# Patient Record
Sex: MALE | Race: BLACK OR AFRICAN AMERICAN | NOT HISPANIC OR LATINO | Employment: FULL TIME | URBAN - METROPOLITAN AREA
[De-identification: names, ages, dates, MRNs, and addresses within clinical notes are randomized per-mention and may not be internally consistent; named-entity substitution may affect disease eponyms.]

---

## 2017-01-29 ENCOUNTER — HOSPITAL ENCOUNTER (EMERGENCY)
Facility: HOSPITAL | Age: 47
Discharge: HOME | End: 2017-01-30
Attending: EMERGENCY MEDICINE
Payer: COMMERCIAL

## 2017-01-29 DIAGNOSIS — S01.511A LACERATION OF LIP, INITIAL ENCOUNTER: Primary | ICD-10-CM
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
## 2017-01-29 PROCEDURE — 25000003 PHARM REV CODE 250: Performed by: EMERGENCY MEDICINE

## 2017-01-29 PROCEDURE — 40654 RPR LIP FTH>1HALF VER HT/CPX: CPT

## 2017-01-29 PROCEDURE — 99284 EMERGENCY DEPT VISIT MOD MDM: CPT

## 2017-01-29 PROCEDURE — 99284 EMERGENCY DEPT VISIT MOD MDM: CPT | Mod: ,,, | Performed by: EMERGENCY MEDICINE

## 2017-01-29 RX ORDER — LIDOCAINE HYDROCHLORIDE 20 MG/ML
10 INJECTION, SOLUTION INFILTRATION; PERINEURAL
Status: COMPLETED | OUTPATIENT
Start: 2017-01-29 | End: 2017-01-29

## 2017-01-29 RX ORDER — LIDOCAINE HYDROCHLORIDE AND EPINEPHRINE 20; 10 MG/ML; UG/ML
10 INJECTION, SOLUTION INFILTRATION; PERINEURAL
Status: COMPLETED | OUTPATIENT
Start: 2017-01-29 | End: 2017-01-29

## 2017-01-29 RX ORDER — LIDOCAINE HYDROCHLORIDE 10 MG/ML
10 INJECTION INFILTRATION; PERINEURAL
Status: COMPLETED | OUTPATIENT
Start: 2017-01-29 | End: 2017-01-29

## 2017-01-29 RX ADMIN — LIDOCAINE HYDROCHLORIDE 10 ML: 10 INJECTION, SOLUTION INFILTRATION; PERINEURAL at 10:01

## 2017-01-29 RX ADMIN — LIDOCAINE HYDROCHLORIDE AND EPINEPHRINE 10 ML: 20; 10 INJECTION, SOLUTION INFILTRATION; PERINEURAL at 10:01

## 2017-01-29 RX ADMIN — LIDOCAINE HYDROCHLORIDE 10 ML: 20 INJECTION, SOLUTION INFILTRATION; PERINEURAL at 10:01

## 2017-01-29 NOTE — ED AVS SNAPSHOT
[facility]  [address] [facility] [address]               Chris Mahmood   2017  9:00 PM   ED    Description:  Male : 1970   Department:  [facility]           Your Care was Coordinated By:     Provider Role From To    Shaq Monet MD Attending Provider 17 2100 --      Reason for Visit     Animal Bite           Diagnoses this Visit        Comments    Laceration of lip, initial encounter    -  Primary     Dog bite, initial encounter           ED Disposition     None           To Do List           Follow-up Information     Schedule an appointment as soon as possible for a visit with Lars Crum MD.    Specialty:  Plastic Surgery    Contact information:    [address] [place] [id number]  [phone]          [facility]    Specialty:  Emergency Medicine    Why:  suture removal in 7 day    Contact information:    [address] [place] [id number] [phone]       These Medications        Disp Refills Start End    chlorhexidine (PERIDEX) 0.12 % solution 118 mL 0 2017    Use as directed 15 mLs in the mouth or throat 2 (two) times daily. - Mouth/Throat    Pharmacy: [facility] - [address] Ph #: [phone]       amoxicillin-clavulanate 875-125mg (AUGMENTIN) 875-125 mg per tablet 14 tablet 0 2017    Take 1 tablet by mouth 2 (two) times daily. - Oral    Pharmacy: [facility] - [address] Ph #: [phone]         [facility]     [facility] Care [facility] -  Assistance  Registered nurses in the [facility] provide clinical advisement, health education, appointment booking, and other advisory services.  Call for this free service at [phone].             Medications           Message regarding Medications     Verify the changes and/or  additions to your medication regime listed below are the same as discussed with your clinician today.  If any of these changes or additions are incorrect, please notify your healthcare provider.        START taking these NEW medications        Refills    chlorhexidine (PERIDEX) 0.12 % solution 0    Sig: Use as directed 15 mLs in the mouth or throat 2 (two) times daily.    Class: Print    Route: Mouth/Throat    amoxicillin-clavulanate 875-125mg (AUGMENTIN) 875-125 mg per tablet 0    Sig: Take 1 tablet by mouth 2 (two) times daily.    Class: Print    Route: Oral      These medications were administered today        Dose Freq    lidocaine HCL 10 mg/ml (1%) injection 10 mL 10 mL ED 1 Time    Sig: 10 mLs by Infiltration route ED 1 Time.    Class: Normal    Route: Infiltration    lidocaine-EPINEPHrine 2%-1:100,000 injection 10 mL 10 mL ED 1 Time    Sig: Inject 10 mLs into the skin ED 1 Time.    Class: Normal    Route: Intradermal    lidocaine HCL 20 mg/ml (2%) injection 10 mL 10 mL ED 1 Time    Sig: 10 mLs by Infiltration route ED 1 Time.    Class: Normal    Route: Infiltration      STOP taking these medications     ibuprofen (ADVIL,MOTRIN) 800 MG tablet     tizanidine (ZANAFLEX) 4 MG tablet            Verify that the below list of medications is an accurate representation of the medications you are currently taking.  If none reported, the list may be blank. If incorrect, please contact your healthcare provider. Carry this list with you in case of emergency.           Current Medications     ranitidine (ZANTAC) 150 MG capsule Take 150 mg by mouth 2 (two) times daily.    amoxicillin-clavulanate 875-125mg (AUGMENTIN) 875-125 mg per tablet Take 1 tablet by mouth 2 (two) times daily.    chlorhexidine (PERIDEX) 0.12 % solution Use as directed 15 mLs in the mouth or throat 2 (two) times daily.    lidocaine HCL 10 mg/ml (1%) injection 10 mL 10 mLs by Infiltration route ED 1 Time.    lidocaine HCL 20 mg/ml (2%) injection 10 mL 10  "mLs by Infiltration route ED 1 Time.    lidocaine-EPINEPHrine 2%-1:100,000 injection 10 mL Inject 10 mLs into the skin ED 1 Time.           Clinical Reference Information           Your Vitals Were     BP Pulse Temp Resp Height Weight    182/104 (BP Location: Right arm, Patient Position: Sitting) 89 98.7 °F (37.1 °C) (Oral) 18 5' 10" (1.778 m) 117.9 kg (260 lb)    SpO2 BMI             96% 37.31 kg/m2         Allergies as of 1/30/2017     No Known Allergies      Immunizations Administered on Date of Encounter - 1/30/2017     None      ED Micro, Lab, POCT     None      ED Imaging Orders     None        Discharge Instructions         Animal Bite (General)  An animal bite can cause a wound deep enough to break the skin. In such cases, the wound is cleaned and then closed. Sometimes, the wound is not closed completely. This is so that fluid can drain if the wound becomes infected. In addition to wound care, a tetanus shot may be given, if needed.    Home care  · Care for the wound as directed. If a dressing was applied to the wound, be sure to change it as directed.  · Wash your hands well with soap and warm water before and after caring for the wound. This helps lower the risk of infection.  · If the wound bleeds, place a clean, soft cloth on the wound. Then firmly apply pressure until the bleeding stops. This may take up to 5 minutes. Do not release the pressure and look at the wound during this time.  · Most skin wounds heal within 10 days. But an infection can occur even with proper treatment. So be sure to watch the wound for signs of infection (see below). Check the wound as often as directed by your health care provider.  · Antibiotics may be prescribed. These help prevent or treat infection. If youre given antibiotics, take them as directed. Also be sure to complete the medications.  Rabies Prevention  Rabies is a virus that can be carried in certain animals. These can include domestic animals such as dogs and " cats. Wild animals such as skunks, raccoons, foxes, and bats can also carry rabies. Pets fully vaccinated against rabies (2 shots) are at very low risk of infection. But because human rabies is almost always fatal, any biting pet should be confined for 10 days as an extra precaution. In general, if there is a risk for rabies, the following steps may need to be taken:  · If someones pet dog or cat has bitten you, it should be kept in a secure area for the next 10 days to watch for signs of illness. (If the pet owner wont allow this, contact your local animal control center.) If the dog or cat becomes ill or dies during that time, contact your local animal control center at once so the animal may be tested for rabies. If the pet stays healthy for the next 10 days, there is no danger of rabies in the animal or you.  · If a stray pet bit you, contact your local animal control center. They can give information on capture, quarantine, and animal rabies testing.  · If you cant locate the animal that bit you in the next 2 days, and if rabies exists in your region, you may need to receive the rabies vaccine series. Call your health care provider right away. Or, return to the emergency department promptly.  · All animal bites should be reported to the local animal control center. If you were not given a form to fill out, you can report this yourself.  Follow-up care  Follow up with your health care provider, or as directed.  When to seek medical advice  Call your health care provider right away if any of these occur:  · Signs of infection:  ¨ Spreading redness or warmth from the wound  ¨ Increased pain or swelling  ¨ Fever of 100.4ºF (38ºC) or higher, or as directed by your health care provider  ¨ Colored fluid or pus draining from the wound  · Signs of rabies infection:  ¨ Headache  ¨ Confusion  ¨ Strange behavior  ¨ Seizure  · Decreased ability to move any body part near the bite area  · Bleeding that cannot be stopped  after 5 minutes of firm pressure  © 8919-4253 [employer]. [address] [place] [address], [place] [address]. All rights reserved. This information is not intended as a substitute for professional medical care. Always follow your healthcare professional's instructions.          Lip or Mouth Laceration  A laceration is a cut through the skin. When the cut is on the outside of the lip, it may be closed with stitches, surgical tape, or skin glue. Cuts inside the mouth may be sutured or left open, depending on the size. When stitches are used in the mouth, they are usually the kind that dissolve.  A tetanus shot may be given if you are not current on this vaccination and the object that caused the cut may lead to tetanus.    Home care  · If you were given an antibiotic to prevent infection, do not stop taking this medication until you have finished the prescribed course or the healthcare provider tells you to stop.  · The healthcare provider may prescribe medications for pain. Follow instructions for taking these medications.   · Follow the healthcare providers instructions on how to care for the cut.  · Wash your hands with soap and warm water before and after caring for your cut. This helps prevent infection.  · If the cut is inside your mouth, clean the wound by rinsing your mouth after each meal and at bedtime with a mixture of equal parts water and hydrogen peroxide. (Do not swallow!) Or, you can use a cotton swab to apply hydrogen peroxide directly onto the cut.  · Mouth wounds can cause pain when chewing. Soft foods can help with this. If needed, use a local, over-the-counter numbing solution for pain relief, such as one for teething babies. Apply this directly to the wound with a cotton-tip swab or your finger.  · If the wound is bandaged, leave the original bandage in place for 24 hours. Replace it if it becomes wet or dirty. After 24 hours, change it once a day or as directed.  · Clean the wound daily.  First, remove the bandage. Then wash the area gently with soap and warm water, or as directed by your childs provider. Use a wet cotton swab to loosen and remove any blood or crust that forms. After cleaning, apply a thin layer of antibiotic ointment if advised. Then put on a new bandage.  · If the cut is on the outside of the lip and sutures were used, you may shower as usual after the first 24 hours, but do not put your head under water or swim until the sutures are removed. After removing the bandage, wash the area with soap and water. Use a wet cotton swab to loosen and remove any blood or crust that forms. After cleaning, keep the wound clean and dry. Talk with your doctor before applying any antibiotic ointment to the wound. You may apply an adhesive bandage or leave the wound open. Unless told otherwise, sutures on the inside of the mouth will likely dissolve on their own.  · If skin glue was used, do not scratch, rub, or pick at the adhesive film. Do not place tape directly over the film. Do not apply liquid, ointment, or creams to the wound while the film is in place. Do not clean the wound with peroxide and do not apply ointment. Avoid activities that cause heavy sweating until the film has fallen off. Protect the wound from prolonged exposure to sunlight or tanning lamps. You may shower as usual but do not soak the wound in water (no swimming).  Follow-up care  Follow up with your healthcare provider as directed. If you have sutures that won't dissolve on their own, return as directed to have them removed.  When to seek medical advice  Call your healthcare provider right away if any of these occur:  · Wound bleeding not controlled by direct pressure  · Signs of infection, including increasing pain in the wound, increasing wound redness or swelling, or pus or bad odor coming from the wound  · Fever of 100.4°F (38.ºC) or higher or as directed by your healthcare provider  · Stitches come apart or fall out  or surgical tape falls off before 5 days  · Wound edges re-open  · Wound changes colors  · Numbness around the wound   © 3600-0360 [employer]. [address] [place] [address], [place] [address]. All rights reserved. This information is not intended as a substitute for professional medical care. Always follow your healthcare professional's instructions.          [facility] Sign-Up     Activating your [facility] account is as easy as 1-2-3!     1) Visit [email or web address], select Sign Up Now, enter this activation code and your date of birth, then select Next.  [email or web address]  Expires: 3/16/2017 12:06 AM      2) Create a username and password to use when you visit [facility] in the future and select a security question in case you lose your password and select Next.    3) Enter your e-mail address and click Sign Up!    Additional Information  If you have questions, please e-mail [email or web address] or call [phone] to talk to our [facility] staff. Remember, [facility] is NOT to be used for urgent needs. For medical emergencies, dial 911.         Smoking Cessation     If you would like to quit smoking:   You may be eligible for free services if you are a [place] resident and started smoking cigarettes before September 1, 1988.  Call the Smoking Cessation Trust ([facility]) toll free at (299) 295-5488 or (666) 941-8917.   Call 1-800-QUIT-NOW if you do not meet the above criteria.             [facility] complies with applicable Federal civil rights laws and does not discriminate on the basis of race, color, national origin, age, disability, or sex.        Language Assistance Services     ATTENTION: Language assistance services are available, free of charge. Please call [phone].      ATENCIÓN: Si habla español, tiene a whitaker disposición servicios gratuitos de asistencia lingüística. Llame al [phone].     CHÚ Ý: N?u b?n nói Ti?ng Vi?t, có các d?ch v? h? tr? ngôn ng? mi?n phí dành cho b?n.  G?i s? [phone].

## 2017-01-30 VITALS
DIASTOLIC BLOOD PRESSURE: 78 MMHG | HEIGHT: 70 IN | BODY MASS INDEX: 37.22 KG/M2 | TEMPERATURE: 98 F | WEIGHT: 260 LBS | HEART RATE: 76 BPM | OXYGEN SATURATION: 99 % | RESPIRATION RATE: 18 BRPM | SYSTOLIC BLOOD PRESSURE: 136 MMHG

## 2017-01-30 RX ORDER — CHLORHEXIDINE GLUCONATE ORAL RINSE 1.2 MG/ML
15 SOLUTION DENTAL 2 TIMES DAILY
Qty: 118 ML | Refills: 0 | Status: SHIPPED | OUTPATIENT
Start: 2017-01-30 | End: 2017-02-13

## 2017-01-30 RX ORDER — AMOXICILLIN AND CLAVULANATE POTASSIUM 875; 125 MG/1; MG/1
1 TABLET, FILM COATED ORAL 2 TIMES DAILY
Qty: 14 TABLET | Refills: 0 | Status: SHIPPED | OUTPATIENT
Start: 2017-01-30 | End: 2017-02-09

## 2017-01-30 NOTE — ED PROVIDER NOTES
Encounter Date: 1/29/2017    SCRIBE #1 NOTE: I, Rosemary Ward, am scribing for, and in the presence of, Dr. Monet.       History     Chief Complaint   Patient presents with    Animal Bite     transfer from [facility], dog bite about 1900, missing part of bottom lip.      Review of patient's allergies indicates:  No Known Allergies  HPI Comments: Time patient was seen by the provider: 9:09 PM      The patient is a 46 y.o. male with no significant medical hx that presents to the ED as a transfer from [place] with a complaint of a dog bite at approximately 1900 this evening, 2 hours prior to C arrival. Pt was playing with two dogs and his puppy bit him on the right side of his lip, removing a portion of the lip. Pt states tetanus is UTD. No other trauma. No additional complaints.  The history is provided by the patient.     Past Medical History   Diagnosis Date    GERD (gastroesophageal reflux disease)      No past medical history pertinent negatives.  History reviewed. No pertinent past surgical history.  No family history on file.  Social History   Substance Use Topics    Smoking status: Former Smoker    Smokeless tobacco: None    Alcohol use Yes      Comment: pt drinks moonshine daily     Review of Systems   Constitutional: Negative for fever.   HENT: Negative for nosebleeds.         Dog bite to lip with a large piece missing.   Eyes: Negative for visual disturbance.   Respiratory: Negative for shortness of breath.    Cardiovascular: Negative for chest pain.   Gastrointestinal: Negative for vomiting.   Genitourinary: Negative for hematuria.   Musculoskeletal: Negative for neck stiffness.   Skin: Negative for rash.   Neurological: Negative for syncope.       Physical Exam   Initial Vitals   BP Pulse Resp Temp SpO2   01/29/17 2058 01/29/17 2058 01/29/17 2058 01/29/17 2058 01/29/17 2058   182/104 89 18 98.7 °F (37.1 °C) 96 %     Physical Exam    Nursing note and vitals reviewed.  Constitutional: He  appears well-developed and well-nourished.   HENT:   Large piece of lower lip missing. No other signs of trauma. No active bleeding.     Eyes: EOM are normal. Pupils are equal, round, and reactive to light.   Neck: Normal range of motion. Neck supple.   Cardiovascular: Normal rate, regular rhythm, normal heart sounds and intact distal pulses.   Pulmonary/Chest: Breath sounds normal. No respiratory distress. He has no wheezes. He has no rhonchi. He has no rales.   Abdominal: Soft. There is no tenderness. There is no rebound and no guarding.   Musculoskeletal: Normal range of motion.   Neurological: He is alert and oriented to person, place, and time.   Skin: Skin is warm and dry.         ED Course   Procedures  Labs Reviewed - No data to display          Medical Decision Making:   History:   Old Medical Records: I decided to obtain old medical records.  Initial Assessment:   Pt presents as a transfer from [place] after a dog bite to the lip from a puppy. Tetanus shot UTD. No concern for rabies from this story. Will consult and discuss with plastic surgery.            Scribe Attestation:   Scribe #1: I performed the above scribed service and the documentation accurately describes the services I performed. I attest to the accuracy of the note.    Attending Attestation:           Physician Attestation for Scribe:  Physician Attestation Statement for Scribe #1: I, Dr. Monet, reviewed documentation, as scribed by Rosemary Ward in my presence, and it is both accurate and complete.         Attending ED Notes:   9:13 PM  Plastic surgery paged.    9:40 PM  Discussed with plastic surgery. They are at bedside and will repair the lac.    12:22 AM  Plastics repaired the wound. Will discharge home.          ED Course     Clinical Impression:   The primary encounter diagnosis was Laceration of lip, initial encounter. A diagnosis of Dog bite, initial encounter was also pertinent to this visit.    Disposition:   Disposition:  Discharged  Condition: Stable       Shaq Monet MD  01/30/17 2269

## 2017-01-30 NOTE — CONSULTS
Consult Note  Plastic Surgery    Consult Requested By: ER  Reason for Consult: dog bite    SUBJECTIVE:     History of Present Illness:  The patient is a 46 y.o. male with no significant medical hx that presents to the ED as a transfer from [place] with a complaint of a dog bite at approximately 1900 this evening, 2 hours prior to C arrival. Pt was playing with two dogs and his puppy bit him on the right side of his lip, removing a portion of the lip. Pt states tetanus is UTD. No other trauma. No additional complaints.    Scheduled Meds:   lidocaine HCL 10 mg/ml (1%)  10 mL Infiltration ED 1 Time    lidocaine HCL 20 mg/ml (2%)  10 mL Infiltration ED 1 Time    lidocaine-EPINEPHrine 2%-1:100,000  10 mL Intradermal ED 1 Time     Continuous Infusions:   PRN Meds:    Review of patient's allergies indicates:  No Known Allergies    Past Medical History   Diagnosis Date    GERD (gastroesophageal reflux disease)      History reviewed. No pertinent past surgical history.  No family history on file.  Social History   Substance Use Topics    Smoking status: Former Smoker    Smokeless tobacco: None    Alcohol use Yes      Comment: pt drinks moonshine daily        Review of Systems:      OBJECTIVE:     Vital Signs (Most Recent)  Temp: 98.7 °F (37.1 °C) (01/29/17 2058)  Pulse: 89 (01/29/17 2058)  Resp: 18 (01/29/17 2058)  BP: (!) 182/104 (01/29/17 2058)  SpO2: 96 % (01/29/17 2058)    Vital Signs Range (Last 24H):  Temp:  [98.7 °F (37.1 °C)]   Pulse:  [89]   Resp:  [18]   BP: (182)/(104)   SpO2:  [96 %]     Physical Exam:  Pt had gingival buccal sulcus tear from canine to canine along mandible  Pt has full thickness segment  Of right lower lip missing 5-10% through the vermillion roll    After consent pt was given a mental nerve block with lidocaine 2% and skin was infiltrated with 1% lido with epinephrine  Wound was irrigated copiously with normal saline  Pt was prepped and draped in standard sterile fashion  Vicryl  used to approximate orbicularis oris muscle.  Chromic used to approximate mucosa, wet and dry vermillion.  A back cut was done along the vermillion roll to advance medial and lateral segment of lip to meet.  Dog ear at skin was excised.  Skin was approximated with 5-0 prolene.  Pt tolerated procedure well.    ASSESSMENT/PLAN:     Pt s/p dog bite, irrigation debridement and repair at bedside.    Augmention PO  peridex mouth wash  Sutures (prolene) at skin to be out in 5-7 days (saturday or Sunday)  May f/u with dr marin PRN

## 2017-01-30 NOTE — ED NOTES
Plastic Surgery MD remains at the bedside for suturing. Pt tolerating procedure well. NAD noted. Will continue to monitor.

## 2017-01-30 NOTE — ED NOTES
Patient identifiers verified and correct for Chris Mahmood.    LOC: The patient is awake, alert and aware of environment with an appropriate affect, the patient is oriented x 3 and speaking appropriately.  APPEARANCE: Patient resting comfortably and in no acute distress, patient is clean and well groomed, patient's clothing is properly fastened.  SKIN: The skin is warm and dry, color consistent with ethnicity, patient has normal skin turgor and moist mucus membranes, large portion of right lower lip has been bitten off. No active bleeding noted.  MUSCULOSKELETAL: Patient moving all extremities spontaneously, no obvious swelling or deformities noted.  RESPIRATORY: Airway is open and patent, respirations are spontaneous, patient has a normal effort and rate, no accessory muscle use noted, bilateral breath sounds.  CARDIAC: Patient has a normal rate and regular rhythm, no periphreal edema noted, capillary refill < 3 seconds.  ABDOMEN: Soft and non tender to palpation, no distention noted, normoactive bowel sounds present in all four quadrants.  NEUROLOGIC: Eyes open spontaneously, behavior appropriate to situation, follows commands, facial expression symmetrical, bilateral hand grasp equal and even, purposeful motor response noted, normal sensation in all extremities when touched with a finger.

## 2017-01-30 NOTE — DISCHARGE INSTRUCTIONS
Animal Bite (General)  An animal bite can cause a wound deep enough to break the skin. In such cases, the wound is cleaned and then closed. Sometimes, the wound is not closed completely. This is so that fluid can drain if the wound becomes infected. In addition to wound care, a tetanus shot may be given, if needed.    Home care  · Care for the wound as directed. If a dressing was applied to the wound, be sure to change it as directed.  · Wash your hands well with soap and warm water before and after caring for the wound. This helps lower the risk of infection.  · If the wound bleeds, place a clean, soft cloth on the wound. Then firmly apply pressure until the bleeding stops. This may take up to 5 minutes. Do not release the pressure and look at the wound during this time.  · Most skin wounds heal within 10 days. But an infection can occur even with proper treatment. So be sure to watch the wound for signs of infection (see below). Check the wound as often as directed by your health care provider.  · Antibiotics may be prescribed. These help prevent or treat infection. If youre given antibiotics, take them as directed. Also be sure to complete the medications.  Rabies Prevention  Rabies is a virus that can be carried in certain animals. These can include domestic animals such as dogs and cats. Wild animals such as skunks, raccoons, foxes, and bats can also carry rabies. Pets fully vaccinated against rabies (2 shots) are at very low risk of infection. But because human rabies is almost always fatal, any biting pet should be confined for 10 days as an extra precaution. In general, if there is a risk for rabies, the following steps may need to be taken:  · If someones pet dog or cat has bitten you, it should be kept in a secure area for the next 10 days to watch for signs of illness. (If the pet owner wont allow this, contact your local animal control center.) If the dog or cat becomes ill or dies during that time,  contact your local animal control center at once so the animal may be tested for rabies. If the pet stays healthy for the next 10 days, there is no danger of rabies in the animal or you.  · If a stray pet bit you, contact your local animal control center. They can give information on capture, quarantine, and animal rabies testing.  · If you cant locate the animal that bit you in the next 2 days, and if rabies exists in your region, you may need to receive the rabies vaccine series. Call your health care provider right away. Or, return to the emergency department promptly.  · All animal bites should be reported to the local animal control center. If you were not given a form to fill out, you can report this yourself.  Follow-up care  Follow up with your health care provider, or as directed.  When to seek medical advice  Call your health care provider right away if any of these occur:  · Signs of infection:  ¨ Spreading redness or warmth from the wound  ¨ Increased pain or swelling  ¨ Fever of 100.4ºF (38ºC) or higher, or as directed by your health care provider  ¨ Colored fluid or pus draining from the wound  · Signs of rabies infection:  ¨ Headache  ¨ Confusion  ¨ Strange behavior  ¨ Seizure  · Decreased ability to move any body part near the bite area  · Bleeding that cannot be stopped after 5 minutes of firm pressure  © 4102-1333 [employer]. [address] [place] [id number]. All rights reserved. This information is not intended as a substitute for professional medical care. Always follow your healthcare professional's instructions.          Lip or Mouth Laceration  A laceration is a cut through the skin. When the cut is on the outside of the lip, it may be closed with stitches, surgical tape, or skin glue. Cuts inside the mouth may be sutured or left open, depending on the size. When stitches are used in the mouth, they are usually the kind that dissolve.  A tetanus shot may be given if  you are not current on this vaccination and the object that caused the cut may lead to tetanus.    Home care  · If you were given an antibiotic to prevent infection, do not stop taking this medication until you have finished the prescribed course or the healthcare provider tells you to stop.  · The healthcare provider may prescribe medications for pain. Follow instructions for taking these medications.   · Follow the healthcare providers instructions on how to care for the cut.  · Wash your hands with soap and warm water before and after caring for your cut. This helps prevent infection.  · If the cut is inside your mouth, clean the wound by rinsing your mouth after each meal and at bedtime with a mixture of equal parts water and hydrogen peroxide. (Do not swallow!) Or, you can use a cotton swab to apply hydrogen peroxide directly onto the cut.  · Mouth wounds can cause pain when chewing. Soft foods can help with this. If needed, use a local, over-the-counter numbing solution for pain relief, such as one for teething babies. Apply this directly to the wound with a cotton-tip swab or your finger.  · If the wound is bandaged, leave the original bandage in place for 24 hours. Replace it if it becomes wet or dirty. After 24 hours, change it once a day or as directed.  · Clean the wound daily. First, remove the bandage. Then wash the area gently with soap and warm water, or as directed by your childs provider. Use a wet cotton swab to loosen and remove any blood or crust that forms. After cleaning, apply a thin layer of antibiotic ointment if advised. Then put on a new bandage.  · If the cut is on the outside of the lip and sutures were used, you may shower as usual after the first 24 hours, but do not put your head under water or swim until the sutures are removed. After removing the bandage, wash the area with soap and water. Use a wet cotton swab to loosen and remove any blood or crust that forms. After cleaning,  keep the wound clean and dry. Talk with your doctor before applying any antibiotic ointment to the wound. You may apply an adhesive bandage or leave the wound open. Unless told otherwise, sutures on the inside of the mouth will likely dissolve on their own.  · If skin glue was used, do not scratch, rub, or pick at the adhesive film. Do not place tape directly over the film. Do not apply liquid, ointment, or creams to the wound while the film is in place. Do not clean the wound with peroxide and do not apply ointment. Avoid activities that cause heavy sweating until the film has fallen off. Protect the wound from prolonged exposure to sunlight or tanning lamps. You may shower as usual but do not soak the wound in water (no swimming).  Follow-up care  Follow up with your healthcare provider as directed. If you have sutures that won't dissolve on their own, return as directed to have them removed.  When to seek medical advice  Call your healthcare provider right away if any of these occur:  · Wound bleeding not controlled by direct pressure  · Signs of infection, including increasing pain in the wound, increasing wound redness or swelling, or pus or bad odor coming from the wound  · Fever of 100.4°F (38.ºC) or higher or as directed by your healthcare provider  · Stitches come apart or fall out or surgical tape falls off before 5 days  · Wound edges re-open  · Wound changes colors  · Numbness around the wound   © 5586-5146 [employer]. [address] [place] [address], [place] [address]. All rights reserved. This information is not intended as a substitute for professional medical care. Always follow your healthcare professional's instructions.

## 2021-04-26 ENCOUNTER — PATIENT MESSAGE (OUTPATIENT)
Dept: RESEARCH | Facility: HOSPITAL | Age: 51
End: 2021-04-26

## 2021-05-04 ENCOUNTER — TELEPHONE (OUTPATIENT)
Dept: NEUROSURGERY | Facility: CLINIC | Age: 51
End: 2021-05-04

## 2021-05-05 ENCOUNTER — OFFICE VISIT (OUTPATIENT)
Dept: NEUROSURGERY | Facility: CLINIC | Age: 51
End: 2021-05-05
Payer: MEDICAID

## 2021-05-05 VITALS
TEMPERATURE: 99 F | BODY MASS INDEX: 37.15 KG/M2 | HEIGHT: 70 IN | HEART RATE: 96 BPM | WEIGHT: 259.5 LBS | SYSTOLIC BLOOD PRESSURE: 159 MMHG | DIASTOLIC BLOOD PRESSURE: 100 MMHG

## 2021-05-05 DIAGNOSIS — M54.9 DORSALGIA, UNSPECIFIED: ICD-10-CM

## 2021-05-05 DIAGNOSIS — M48.062 LUMBAR STENOSIS WITH NEUROGENIC CLAUDICATION: Primary | ICD-10-CM

## 2021-05-05 DIAGNOSIS — F17.200 SMOKER: ICD-10-CM

## 2021-05-05 PROCEDURE — 99204 OFFICE O/P NEW MOD 45 MIN: CPT | Mod: S$PBB,,, | Performed by: NURSE PRACTITIONER

## 2021-05-05 PROCEDURE — 99999 PR PBB SHADOW E&M-EST. PATIENT-LVL III: CPT | Mod: PBBFAC,,, | Performed by: NURSE PRACTITIONER

## 2021-05-05 PROCEDURE — 99213 OFFICE O/P EST LOW 20 MIN: CPT | Mod: PBBFAC | Performed by: NURSE PRACTITIONER

## 2021-05-05 PROCEDURE — 99204 PR OFFICE/OUTPT VISIT, NEW, LEVL IV, 45-59 MIN: ICD-10-PCS | Mod: S$PBB,,, | Performed by: NURSE PRACTITIONER

## 2021-05-05 PROCEDURE — 99999 PR PBB SHADOW E&M-EST. PATIENT-LVL III: ICD-10-PCS | Mod: PBBFAC,,, | Performed by: NURSE PRACTITIONER

## 2021-05-05 RX ORDER — FOLIC ACID 0.4 MG
400 TABLET ORAL DAILY
COMMUNITY

## 2021-05-05 RX ORDER — GABAPENTIN 300 MG/1
300 CAPSULE ORAL 3 TIMES DAILY
COMMUNITY

## 2021-05-20 ENCOUNTER — HOSPITAL ENCOUNTER (OUTPATIENT)
Dept: RADIOLOGY | Facility: HOSPITAL | Age: 51
Discharge: HOME | End: 2021-05-20
Attending: NURSE PRACTITIONER
Payer: MEDICAID

## 2021-05-20 DIAGNOSIS — M48.062 LUMBAR STENOSIS WITH NEUROGENIC CLAUDICATION: ICD-10-CM

## 2021-05-20 DIAGNOSIS — M54.9 DORSALGIA, UNSPECIFIED: ICD-10-CM

## 2021-05-20 PROCEDURE — 72110 X-RAY EXAM L-2 SPINE 4/>VWS: CPT | Mod: TC

## 2021-05-20 PROCEDURE — 72110 X-RAY EXAM L-2 SPINE 4/>VWS: CPT | Mod: 26,,, | Performed by: RADIOLOGY

## 2021-05-20 PROCEDURE — 72110 XR LUMBAR SPINE 5 VIEW WITH FLEX AND EXT: ICD-10-PCS | Mod: 26,,, | Performed by: RADIOLOGY

## 2021-06-02 ENCOUNTER — TELEPHONE (OUTPATIENT)
Dept: NEUROSURGERY | Facility: CLINIC | Age: 51
End: 2021-06-02

## 2021-06-24 ENCOUNTER — OFFICE VISIT (OUTPATIENT)
Dept: NEUROSURGERY | Facility: CLINIC | Age: 51
End: 2021-06-24
Payer: MEDICAID

## 2021-06-24 VITALS
SYSTOLIC BLOOD PRESSURE: 133 MMHG | WEIGHT: 259.13 LBS | HEART RATE: 89 BPM | HEIGHT: 70 IN | BODY MASS INDEX: 37.1 KG/M2 | DIASTOLIC BLOOD PRESSURE: 93 MMHG

## 2021-06-24 DIAGNOSIS — M43.17 SPONDYLOLISTHESIS OF LUMBOSACRAL REGION: Primary | ICD-10-CM

## 2021-06-24 DIAGNOSIS — M43.16 SPONDYLOLISTHESIS AT L4-L5 LEVEL: ICD-10-CM

## 2021-06-24 DIAGNOSIS — M48.062 LUMBAR STENOSIS WITH NEUROGENIC CLAUDICATION: ICD-10-CM

## 2021-06-24 PROCEDURE — 99215 OFFICE O/P EST HI 40 MIN: CPT | Mod: 57,S$PBB,, | Performed by: NEUROLOGICAL SURGERY

## 2021-06-24 PROCEDURE — 99213 OFFICE O/P EST LOW 20 MIN: CPT | Mod: PBBFAC | Performed by: NEUROLOGICAL SURGERY

## 2021-06-24 PROCEDURE — 99999 PR PBB SHADOW E&M-EST. PATIENT-LVL III: ICD-10-PCS | Mod: PBBFAC,,, | Performed by: NEUROLOGICAL SURGERY

## 2021-06-24 PROCEDURE — 99215 PR OFFICE/OUTPT VISIT, EST, LEVL V, 40-54 MIN: ICD-10-PCS | Mod: 57,S$PBB,, | Performed by: NEUROLOGICAL SURGERY

## 2021-06-24 PROCEDURE — 99999 PR PBB SHADOW E&M-EST. PATIENT-LVL III: CPT | Mod: PBBFAC,,, | Performed by: NEUROLOGICAL SURGERY

## 2021-06-28 PROBLEM — M43.17 SPONDYLOLISTHESIS OF LUMBOSACRAL REGION: Status: ACTIVE | Noted: 2021-06-28

## 2021-09-03 ENCOUNTER — PATIENT MESSAGE (OUTPATIENT)
Dept: NEUROSURGERY | Facility: CLINIC | Age: 51
End: 2021-09-03

## 2021-12-20 ENCOUNTER — HOSPITAL ENCOUNTER (OUTPATIENT)
Dept: RADIOLOGY | Facility: OTHER | Age: 51
Discharge: HOME | End: 2021-12-20
Attending: NEUROLOGICAL SURGERY
Payer: MEDICAID

## 2021-12-20 DIAGNOSIS — M48.062 LUMBAR STENOSIS WITH NEUROGENIC CLAUDICATION: ICD-10-CM

## 2021-12-20 DIAGNOSIS — M43.16 SPONDYLOLISTHESIS AT L4-L5 LEVEL: ICD-10-CM

## 2021-12-21 ENCOUNTER — HOSPITAL ENCOUNTER (OUTPATIENT)
Dept: RADIOLOGY | Facility: HOSPITAL | Age: 51
Discharge: HOME | End: 2021-12-21
Attending: NEUROLOGICAL SURGERY
Payer: MEDICAID

## 2021-12-21 PROCEDURE — 72082 X-RAY EXAM ENTIRE SPI 2/3 VW: CPT | Mod: 26,,, | Performed by: RADIOLOGY

## 2021-12-21 PROCEDURE — 72082 X-RAY EXAM ENTIRE SPI 2/3 VW: CPT | Mod: TC

## 2021-12-21 PROCEDURE — 72082 XR SCOLIOSIS COMPLETE: ICD-10-PCS | Mod: 26,,, | Performed by: RADIOLOGY

## 2022-02-07 ENCOUNTER — HOSPITAL ENCOUNTER (OUTPATIENT)
Dept: RADIOLOGY | Facility: HOSPITAL | Age: 52
Discharge: HOME | End: 2022-02-07
Attending: NEUROLOGICAL SURGERY
Payer: MEDICAID

## 2022-02-07 DIAGNOSIS — M48.062 LUMBAR STENOSIS WITH NEUROGENIC CLAUDICATION: ICD-10-CM

## 2022-02-07 DIAGNOSIS — M43.16 SPONDYLOLISTHESIS AT L4-L5 LEVEL: ICD-10-CM

## 2022-02-07 PROCEDURE — 72131 CT LUMBAR SPINE W/O DYE: CPT | Mod: TC

## 2022-02-07 PROCEDURE — 72131 CT LUMBAR SPINE W/O DYE: CPT | Mod: 26,,, | Performed by: RADIOLOGY

## 2022-02-07 PROCEDURE — 72131 CT LUMBAR SPINE WITHOUT CONTRAST: ICD-10-PCS | Mod: 26,,, | Performed by: RADIOLOGY

## 2022-02-17 ENCOUNTER — OFFICE VISIT (OUTPATIENT)
Dept: NEUROSURGERY | Facility: CLINIC | Age: 52
End: 2022-02-17
Payer: MEDICAID

## 2022-02-17 VITALS
BODY MASS INDEX: 37.08 KG/M2 | HEIGHT: 70 IN | WEIGHT: 259 LBS | SYSTOLIC BLOOD PRESSURE: 157 MMHG | DIASTOLIC BLOOD PRESSURE: 94 MMHG | HEART RATE: 80 BPM

## 2022-02-17 DIAGNOSIS — M43.17 SPONDYLOLISTHESIS OF LUMBOSACRAL REGION: Primary | ICD-10-CM

## 2022-02-17 PROCEDURE — 99214 OFFICE O/P EST MOD 30 MIN: CPT | Mod: 57,S$PBB,, | Performed by: NEUROLOGICAL SURGERY

## 2022-02-17 PROCEDURE — 3080F PR MOST RECENT DIASTOLIC BLOOD PRESSURE >= 90 MM HG: ICD-10-PCS | Mod: CPTII,,, | Performed by: NEUROLOGICAL SURGERY

## 2022-02-17 PROCEDURE — 99214 PR OFFICE/OUTPT VISIT, EST, LEVL IV, 30-39 MIN: ICD-10-PCS | Mod: 57,S$PBB,, | Performed by: NEUROLOGICAL SURGERY

## 2022-02-17 PROCEDURE — 1159F PR MEDICATION LIST DOCUMENTED IN MEDICAL RECORD: ICD-10-PCS | Mod: CPTII,,, | Performed by: NEUROLOGICAL SURGERY

## 2022-02-17 PROCEDURE — 3080F DIAST BP >= 90 MM HG: CPT | Mod: CPTII,,, | Performed by: NEUROLOGICAL SURGERY

## 2022-02-17 PROCEDURE — 99213 OFFICE O/P EST LOW 20 MIN: CPT | Mod: PBBFAC | Performed by: NEUROLOGICAL SURGERY

## 2022-02-17 PROCEDURE — 99999 PR PBB SHADOW E&M-EST. PATIENT-LVL III: ICD-10-PCS | Mod: PBBFAC,,, | Performed by: NEUROLOGICAL SURGERY

## 2022-02-17 PROCEDURE — 3077F SYST BP >= 140 MM HG: CPT | Mod: CPTII,,, | Performed by: NEUROLOGICAL SURGERY

## 2022-02-17 PROCEDURE — 1159F MED LIST DOCD IN RCRD: CPT | Mod: CPTII,,, | Performed by: NEUROLOGICAL SURGERY

## 2022-02-17 PROCEDURE — 3008F PR BODY MASS INDEX (BMI) DOCUMENTED: ICD-10-PCS | Mod: CPTII,,, | Performed by: NEUROLOGICAL SURGERY

## 2022-02-17 PROCEDURE — 99999 PR PBB SHADOW E&M-EST. PATIENT-LVL III: CPT | Mod: PBBFAC,,, | Performed by: NEUROLOGICAL SURGERY

## 2022-02-17 PROCEDURE — 3008F BODY MASS INDEX DOCD: CPT | Mod: CPTII,,, | Performed by: NEUROLOGICAL SURGERY

## 2022-02-17 PROCEDURE — 3077F PR MOST RECENT SYSTOLIC BLOOD PRESSURE >= 140 MM HG: ICD-10-PCS | Mod: CPTII,,, | Performed by: NEUROLOGICAL SURGERY

## 2022-02-17 RX ORDER — AMLODIPINE BESYLATE 5 MG/1
TABLET ORAL DAILY
COMMUNITY
Start: 2021-11-22

## 2022-02-17 RX ORDER — PANTOPRAZOLE SODIUM 40 MG/1
40 TABLET, DELAYED RELEASE ORAL DAILY
COMMUNITY
Start: 2022-01-12

## 2022-02-17 RX ORDER — TIZANIDINE 4 MG/1
4-8 TABLET ORAL DAILY
COMMUNITY
Start: 2022-02-10 | End: 2022-04-21 | Stop reason: SDUPTHER

## 2022-02-17 RX ORDER — IBUPROFEN 800 MG/1
800 TABLET ORAL EVERY 6 HOURS PRN
Status: ON HOLD | COMMUNITY
End: 2022-04-11 | Stop reason: HOSPADM

## 2022-02-17 RX ORDER — CYCLOBENZAPRINE HCL 10 MG
10 TABLET ORAL 2 TIMES DAILY PRN
COMMUNITY
Start: 2022-02-09

## 2022-02-17 NOTE — PROGRESS NOTES
"Neurosurgery  Follow up    SUBJECTIVE:       History of Present Illness:  "Chris Mahmood is a 50 y.o. male with PMH of GERD, current smoker, and HTN. He is being seen in clinic today to discuss his concerns with progressive low back and bilateral leg pain. States that he has always struggled with his back due to his profession as a ; however, in 2015 the pain became so severe that it started to affect his ability to perform his work duties. He was recently laid off from his job as a result of the COVID pandemic. Denies trauma.      "Describes the pain as constant, aching, and stabbing across his low back bilaterally with radiation in the posterior aspect of his thighs L > right. States that intermittently with ambulation he experiences sharp shooting pain in anterior and lateral aspects of his legs primarily from the knees down. Rates the back pain as a 10/10 and the left leg as a 8/10 and the right leg as a 6/10. Aggravating factors include walking, prolonged standing, and lying down. Alleviating factors include sitting and bending forward to stretch. He ambulates without the use of assistive devices. Denies b/b dysfunction, saddle anesthesia, or gait instability. Reports weakness in the legs L > R as a result of the pain; as well as numbness and tingling primarily in the L5/S1 dermatome on the left when the pain is severe. The patient attempted PT, but was unable to continue the sessions due to the pain. He has an appointment later this month with pain management to discuss ANA."     As of 6/24/21, the patient reports that he sees pain management tomorrow for discussion of ANA. He has severe, debilitating pain in his left leg 99% of the time. His symptoms are now sever even when sitting down. He does get some relief with bending over, as over a shopping cart.      He denies bleeding, infectious, or anesthetic complications from his previous elbow surgery. He smokes 2-3 cigars daily, and he " "drinks a little moonshine.     As of 2/17/22, the patient reports that he has been getting ANA and RFA, which have been helpful, but the pain is "coming back with a vengeance." He states that he is unable to keep living with the current level of pain he's experiencing.     Both legs hurt, but the left leg has worse shooting pain. This is different than when he got his previous MRI.     He is still smoking cigars. His gastric reflux has been acting up.     Review of patient's allergies indicates:  No Known Allergies    Current Outpatient Medications   Medication Sig Dispense Refill    folic acid (FOLVITE) 400 MCG tablet Take 400 mcg by mouth once daily.      gabapentin (NEURONTIN) 300 MG capsule Take 300 mg by mouth 3 (three) times daily.       No current facility-administered medications for this visit.       Past Medical History:   Diagnosis Date    GERD (gastroesophageal reflux disease)     Hypertension      Past Surgical History:   Procedure Laterality Date    ELBOW SURGERY Left 1994    [facility] in [place]      Family History    None       Social History     Socioeconomic History    Marital status: Single   Tobacco Use    Smoking status: Current Every Day Smoker    Smokeless tobacco: Current User   Substance and Sexual Activity    Alcohol use: Yes     Comment: pt drinks moonshine daily    Drug use: No    Sexual activity: Yes     Partners: Female       Review of Systems  Constitutional: Negative for activity change, appetite change and fever.   HENT: Negative for hearing loss and postnasal drip.    Eyes: Negative for pain and visual disturbance.   Respiratory: Negative for shortness of breath.    Cardiovascular: Negative for chest pain and palpitations.   Gastrointestinal: Negative for abdominal pain.   Endocrine: Negative for cold intolerance, polydipsia, polyphagia and polyuria.   Genitourinary: Negative for difficulty urinating, frequency and urgency.   Musculoskeletal: Positive for back " pain and myalgias. Negative for gait problem.   Skin: Negative for color change and wound.   Neurological: Positive for weakness and numbness. Negative for dizziness and headaches.   Hematological: Does not bruise/bleed easily.   Psychiatric/Behavioral: Negative for confusion and dysphoric mood. The patient is nervous/anxious.      OBJECTIVE:     Vital Signs     There is no height or weight on file to calculate BMI.      Neurosurgery Physical Exam  General: well developed, well nourished, no distress.   Head: normocephalic, atraumatic  Neurologic: Alert and oriented. Thought content appropriate.  GCS: Motor: 6/Verbal: 5/Eyes: 4 GCS Total: 15  Mental Status: Awake, Alert, Oriented x 4  Language: No aphasia  Speech: No dysarthria  Cranial nerves: face symmetric, tongue midline, CN II-XII grossly intact.   Eyes: pupils equal, round, reactive to light with accomodation, EOMI.   Pulmonary: normal respirations, no signs of respiratory distress  Abdomen: soft, non-distended  Skin: Skin is warm, dry and intact.  Sensory: intact to light touch throughout  Motor Strength:Moves all extremities spontaneously with good tone. No abnormal movements seen.      Strength Exam Pain Limited  Strength   Deltoids Triceps Biceps Wrist Extension Wrist Flexion Hand    Upper: R 5/5 5/5 5/5 5/5 5/5 5/5     L 5/5 5/5 5/5 5/5 5/5 5/5       Iliopsoas Quadriceps Knee  Flexion Tibialis  anterior Gastro- cnemius EHL   Lower: R 5/5 5/5 5/5 5/5 5/5 5/5     L 4/5 4/5 5/5 5/5 5/5 4/5      Reflexes:   DTR: 2+ symmetrically throughout.  Ware's: Negative.  Babinski's: Negative.  Clonus: Negative.     Cerebellar:   Finger-to-nose: intact bilaterally   Pronator drift: absent bilaterally  Gait stable, antalgic  Tandem Gait: unable; loss of balance     Cervical:   ROM: Full with flexion, extension, lateral rotation and ear-to-shoulder bend.   Midline TTP: Negative.  Lhermitte's: Negative.     Thoracic:  Midline TTP: Negative.     Lumbar:  Midline TTP:  Negative.  Straight Leg Test: Positive Left    Diagnostic Results:  MRI and flex/ex personally reviewed   There is lipomatosis resulting in L4-L5/L5-S1 stenosis   Dynamic instability of L4 on L5 and L5-S1, grade I   No pars defect noted   High sacral slope noted on scoliosis X-rays     ASSESSMENT/PLAN:     Chris Mahmood is a 51 y.o. male with severe stenosis and dynamic instability and L4-L5/L5-S1 spondylolisthesis. Given these findings, I believe that he would benefit from lumbosacral fixation. He should have pelvic fixation given his sacral slope.     I have recommended L4/L5/L5-S1 TLIF with pelvic fixation. I would like to obtain repeat MRI of the lumbar spine prior to proceeding given the patient's significant worsening of symptoms since previous neural element imaging was obtained. If there is a significant change in findings, this would change our operative plan.     Risks include, but are not limited to, bleeding, pain, infection, scarring, need for further/repeat procedure, death, paralysis, damage to bowel/bladder/sexual function, blindness, stroke/damage to major blood vessels, leak of cerebrospinal fluid, arachnoiditis, adjacent segment disease, pseudoarthrosis, and hardware-related complications. Informed consent was obtained of the patient. I discussed that the case would be performed with my complex spine fellowship-trained partner, Dr. Gray Yadav.      He will need to be off all anticoagulation/anitplatelet agents for one week prior to surgery. A decolonization protocol was given.      I have encouraged him to contact the clinic in interim with any questions, concerns, or adverse clinical change.

## 2022-02-21 ENCOUNTER — TELEPHONE (OUTPATIENT)
Dept: NEUROSURGERY | Facility: CLINIC | Age: 52
End: 2022-02-21
Payer: MEDICAID

## 2022-02-21 DIAGNOSIS — Z01.818 PRE-OP TESTING: ICD-10-CM

## 2022-02-21 DIAGNOSIS — M43.17 SPONDYLOLISTHESIS, LUMBOSACRAL REGION: Primary | ICD-10-CM

## 2022-02-25 ENCOUNTER — HOSPITAL ENCOUNTER (OUTPATIENT)
Dept: RADIOLOGY | Facility: HOSPITAL | Age: 52
Discharge: HOME | End: 2022-02-25
Attending: NEUROLOGICAL SURGERY
Payer: MEDICAID

## 2022-02-25 DIAGNOSIS — M43.17 SPONDYLOLISTHESIS OF LUMBOSACRAL REGION: ICD-10-CM

## 2022-02-25 PROCEDURE — 72148 MRI LUMBAR SPINE WITHOUT CONTRAST: ICD-10-PCS | Mod: 26,,, | Performed by: RADIOLOGY

## 2022-02-25 PROCEDURE — 72148 MRI LUMBAR SPINE W/O DYE: CPT | Mod: TC

## 2022-02-25 PROCEDURE — 72148 MRI LUMBAR SPINE W/O DYE: CPT | Mod: 26,,, | Performed by: RADIOLOGY

## 2022-03-09 DIAGNOSIS — Z01.818 ENCOUNTER FOR OTHER PREPROCEDURAL EXAMINATION: Primary | ICD-10-CM

## 2022-03-09 DIAGNOSIS — Z01.818 PREOPERATIVE TESTING: Primary | ICD-10-CM

## 2022-03-09 NOTE — ANESTHESIA PAT ROS NOTE
03/09/2022  Chris Mahmood is a 51 y.o., male.      Pre-op Assessment          Review of Systems  Anesthesia Hx:  No problems with previous Anesthesia  Denies Family Hx of Anesthesia complications.   Denies Personal Hx of Anesthesia complications.   Social:  Smoker, Alcohol Use SMOKING 2 CIGARS DAILY.    DAILY ALCOHOL USE.   Hematology/Oncology:  Hematology Normal   Oncology Normal     EENT/Dental:EENT/Dental Normal   Cardiovascular:   Hypertension  Denies Angina.  Functional Capacity low / < 4 METS    Pulmonary:   Denies Shortness of breath.  Denies Recent URI.    Renal/:   BPH    Hepatic/GI:   GERD    Musculoskeletal:  Joint Disease:  Arthritis, Osteoarthritis  Lumbar Spine Disorders, Lumbar Disc Disease, Spondylolisthesis LUMBAR STENOSIS  Neurological:  Neurology Normal  Osteoarthritis    Endocrine:  Obesity / BMI > 30  Dermatological:  Skin Normal    Psych:  Psychiatric Normal           Physical Exam  General:  Obesity      Airway/Jaw/Neck:  Airway Findings: Mouth Opening: Normal   Tongue: Normal   Jaw/Neck Findings: Neck ROM: Normal ROM       Dental:  Dental Findings: In tact     Chest/Lungs:  Chest/Lungs Findings: Clear to auscultation      Heart/Vascular:  Heart Findings: Rate: Normal  Rhythm: Regular Rhythm  Sounds: Normal        Mental Status:  Mental Status Findings:  Cooperative, Alert and Oriented         Anesthesia Assessment: Preoperative EQUATION    Planned Procedure: Procedure(s) (LRB):  *AIRO* L4- L5, L5-S1 ILIAC FUSION, SPINE, LUMBAR,  OPEN TLIF, POSTERIOR APPROACH (N/A)  Requested Anesthesia Type:General  Surgeon: Susanne Tiwari MD  Service: Neurosurgery  Known or anticipated Date of Surgery:4/6/2022    Surgeon notes: reviewed    Electronic QUestionnaire Assessment completed via nurse interview with patient.        Triage considerations:         Previous anesthesia records:No problems  and Not available    Last PCP note: within 1 month , outside [facility]   Subspecialty notes: Neurosurgery    Other important co-morbidities:PER Epic:  GERD, HTN, Obesity, Smoker and  LUMBO SACRAL SPONDYLOLISTHESIS, BPH      Tests already available:  Available tests,  within 3 months , 3-6 months ago , 6-12 months ago , within [facility] .2/25/2022 MRI LUMBAR SPINE W/O CONTRAST, 2/7/2022 CT LUMBAR SPINE W/O CONTRAST, 12/21/2021 XRAY SCOLIOSIS COMPLETE, 5/20/2021 XRAY LUMBAR COMPLETE INCLUDING FLEX & EXT             Instructions given. (See in Nurse's note)    Optimization:  Anesthesia Preop Clinic Assessment  Indicated    Medical Opinion Indicated             Plan:    Testing:  BMP, CBC, EKG, PT/INR, PTT, T&S and UA   Pre-anesthesia  visit       Visit focus: concerns in complex and/or prolonged anesthesia, position other than supine     Consultation:Patient's PCP for re-evaluation( IN THE PROCESS OF GETTING CLEARANCE FROM HIS PCP)     Patient  has previously scheduled Medical Appointment:2/25 MRI    Navigation: Tests Scheduled. TBD             Consults scheduled.TBD             Results will be tracked by Preop Clinic.  3/18 Medical clearance given by Dr. Sivan Aleman on 3/9. Received H&P, CXR report, Labs, UA, and EKG. ( all scaned to media)   Vibha Christian RN BSN

## 2022-03-09 NOTE — PRE-PROCEDURE INSTRUCTIONS
Patient stated has not had any problem with anesthesia in the past. Will need medical clearance. He said he saw his PCP,Dr.Charlene Aleman, on 3/9. He brought her a preop testing form from . He said he had labs, ua, and ekg today and has a CXR scheduled for tomorrow, 3/10 at 7:15 am EJ. Will need poc appt and t&s. Our  will call to set up these appts.  Preop instructions given. Hold aspirin, aspirin containing products, nsaids(aleve, advil, motrin, ibuprofen, naprosyn, naproxen, voltaren, diclofenac), vitamins ( Folic acid or Flovite) and supplements one week prior to surgery.  May take Tylenol.  ( sent to my chart portal)  Verbalizes understanding.

## 2022-03-10 ENCOUNTER — HOSPITAL ENCOUNTER (OUTPATIENT)
Dept: RADIOLOGY | Facility: HOSPITAL | Age: 52
Discharge: HOME | End: 2022-03-10
Attending: STUDENT IN AN ORGANIZED HEALTH CARE EDUCATION/TRAINING PROGRAM
Payer: MEDICAID

## 2022-03-10 ENCOUNTER — TELEPHONE (OUTPATIENT)
Dept: PREADMISSION TESTING | Facility: HOSPITAL | Age: 52
End: 2022-03-10
Payer: MEDICAID

## 2022-03-10 DIAGNOSIS — Z01.818 ENCOUNTER FOR OTHER PREPROCEDURAL EXAMINATION: ICD-10-CM

## 2022-03-10 PROCEDURE — 71046 XR CHEST PA AND LATERAL: ICD-10-PCS | Mod: 26,,, | Performed by: RADIOLOGY

## 2022-03-10 PROCEDURE — 71046 X-RAY EXAM CHEST 2 VIEWS: CPT | Mod: 26,,, | Performed by: RADIOLOGY

## 2022-03-10 PROCEDURE — 71046 X-RAY EXAM CHEST 2 VIEWS: CPT | Mod: TC

## 2022-03-10 NOTE — TELEPHONE ENCOUNTER
----- Message from Vibha Christian RN sent at 3/9/2022  3:20 PM CST -----  Surgery 4/6  Please schedule poc appt and t&s.  Thanks!

## 2022-03-24 ENCOUNTER — HOSPITAL ENCOUNTER (OUTPATIENT)
Dept: PREADMISSION TESTING | Facility: HOSPITAL | Age: 52
Discharge: HOME | End: 2022-03-24
Attending: NEUROLOGICAL SURGERY
Payer: MEDICAID

## 2022-03-24 VITALS
SYSTOLIC BLOOD PRESSURE: 138 MMHG | RESPIRATION RATE: 18 BRPM | TEMPERATURE: 99 F | DIASTOLIC BLOOD PRESSURE: 99 MMHG | HEART RATE: 81 BPM | HEIGHT: 70 IN | WEIGHT: 269 LBS | BODY MASS INDEX: 38.51 KG/M2 | OXYGEN SATURATION: 97 %

## 2022-03-24 NOTE — DISCHARGE INSTRUCTIONS
Your surgery has been scheduled for:__________4/6/2022________________________________    You should report to:  ____[facility], located on the [place] side of the first floor of the           [facility] ([phone])  __x__[facility], located on the [facility] side of the            Second floor of the [facility] ([phone])  ____3rd Floor [facility] located on the [facility] side of the [facility] [phone]  ____[facility]: located at [address] [place] [address]. [address].     Please Note   Tell your doctor if you take Aspirin, products containing Aspirin, herbal medications  or blood thinners, such as Coumadin, Ticlid, or Plavix.  (Consult your provider regarding holding or stopping before surgery).  Arrange for someone to drive you home following surgery.  You will not be allowed to leave the surgical facility alone or drive yourself home following sedation and anesthesia.    Before Surgery  Stop taking all herbal medications, vitamins, and supplements 7 days prior to surgery  No Motrin/Advil (Ibuprofen) 7 days before surgery  No Aleve (Naproxen) 7 days before surgery  Stop Taking Asprin, products containing Asprin __n/a___days before surgery  Stop taking blood thinners__n/a_____days before surgery  No Goody's/BC  Powder 7 days before surgery  Refrain from drinking alcoholic beverages for 24hours before and after surgery  Stop or limit smoking ___7______days before surgery  You may take Tylenol for pain    Night before Surgery  Do not eat or drink after midnight  Take a shower or bath (shower is recommended).  Bathe with Hibiclens soap or an antibacterial soap from the neck down.  If not supplied by your surgeon, hibiclens soap will need to be purchased over the counter in pharmacy.  Rinse soap off thoroughly.  Shampoo your hair with your regular shampoo    The Day of  Surgery  Take another bath or shower with hibiclens or any antibacterial soap, to reduce the chance of infection.  Take heart and blood pressure medications with a small sip of water, as advised by the perioperative team.  Do not take fluid pills  You may brush your teeth and rinse your mouth, but do not swall any additional water.   Do not apply perfumes, powder, body lotions or deodorant on the day of surgery.  Nail polish should be removed.  Do not wear makeup or moisturizer  Wear comfortable clothes, such as a button front shirt and loose fitting pants.  Leave all jewelry, including body piercings, and valuables at home.    Bring any devices you will neeed after surgery such as crutches or canes.  If you have sleep apnea, please bring your CPAP machine  In the event that your physical condition changes including the onset of a cold or respiratory illness, or if you have to delay or cancel your surgery, please notify your surgeon.       Anesthesia: General Anesthesia     You are watched continuously during your procedure by your anesthesia provider.     Youre due to have surgery. During surgery, youll be given medicine called anesthesia or anesthetic. This will keep you comfortable and pain-free. Your anesthesia provider will use general anesthesia.  What is general anesthesia?  General anesthesia puts you into a state like deep sleep. It goes into the bloodstream (IV anesthetics), into the lungs (gas anesthetics), or both. You feel nothing during the procedure. You will not remember it. During the procedure, the anesthesia provider monitors you continuously. He or she checks your heart rate and rhythm, blood pressure, breathing, and blood oxygen.  IV anesthetics. IV anesthetics are given through an IV line in your arm. Theyre often given first. This is so you are asleep before a gas anesthetic is started. Some kinds of IV anesthetics relieve pain. Others relax you. Your doctor will decide which kind is best  in your case.  Gas anesthetics. Gas anesthetics are breathed into the lungs. They are often used to keep you asleep. They can be given through a facemask or a tube placed in your larynx or trachea (breathing tube).  If you have a facemask, your anesthesia provider will most likely place it over your nose and mouth while youre still awake. Youll breathe oxygen through the mask as your IV anesthetic is started. Gas anesthetic may be added through the mask.  If you have a tube in the larynx or trachea, it will be inserted into your throat after youre asleep.  Anesthesia tools and medicines  You will likely have:  IV anesthetics. These are put into an IV line into your bloodstream.  Gas anesthetics. You breathe these anesthetics into your lungs, where they pass into your bloodstream.  Pulse oximeter. This is a small clip that is attached to the end of your finger. This measures your blood oxygen level.  Electrocardiography leads (electrodes). These are small sticky pads that are placed on your chest. They record your heart rate and rhythm.  Blood pressure cuff. This reads your blood pressure.  Risks and possible complications  General anesthesia has some risks. These include:  Breathing problems  Nausea and vomiting  Sore throat or hoarseness (usually temporary)  Allergic reaction to the anesthetic  Irregular heartbeat (rare)  Cardiac arrest (rare)   Anesthesia safety  Follow all instructions you are given for how long not to eat or drink before your procedure.  Be sure your doctor knows what medicines and drugs you take. This includes over-the-counter medicines, herbs, supplements, alcohol or other drugs. You will be asked when those were last taken.  Have an adult family member or friend drive you home after the procedure.  For the first 24 hours after your surgery:  Do not drive or use heavy equipment.  Do not make important decisions or sign legal documents. If important decisions or signing legal documents is  necessary during the first 24 hours after surgery, have a trusted family member or spouse act on your behalf.  Avoid alcohol.  Have a responsible adult stay with you. He or she can watch for problems and help keep you safe.  Date Last Reviewed: 12/1/2016 © 2000-2017 [facility]. [address] [place] [address], [place] [address]. All rights reserved. This information is not intended as a substitute for professional medical care. Always follow your healthcare professional's instructions.

## 2022-04-05 ENCOUNTER — ANESTHESIA EVENT (OUTPATIENT)
Dept: SURGERY | Facility: HOSPITAL | Age: 52
DRG: 454 | End: 2022-04-05
Payer: MEDICAID

## 2022-04-05 ENCOUNTER — TELEPHONE (OUTPATIENT)
Dept: NEUROSURGERY | Facility: CLINIC | Age: 52
End: 2022-04-05
Payer: MEDICAID

## 2022-04-05 NOTE — ANESTHESIA PREPROCEDURE EVALUATION
[facility]  Anesthesia Pre-Operative Evaluation         Patient Name: Chris Mahmood  YOB: 1970  MRN: [id number]    SUBJECTIVE:     Pre-operative evaluation for Procedure(s) (LRB):  *AIRO* L4- L5, L5-S1 ILIAC FUSION, SPINE, LUMBAR,  OPEN TLIF, POSTERIOR APPROACH (N/A)     04/05/2022    Chris Mahmood is a 51 y.o. male w/ a significant PMHx of obesity, GERD, HTN tobacco use. Now with daily lower back pain radiating to his legs bilaterally.  Reports activity consistent with less than  4 METs. Daily etoh use. No know anesthesia problems.    Patient now presents for the above procedure(s).    LDA: None documented.     Prev airway: None documented.    Drips: None documented.      Patient Active Problem List   Diagnosis    Male hypogonadism    BPH with urinary obstruction    Erectile dysfunction    Spondylolisthesis of lumbosacral region       Review of patient's allergies indicates:  No Known Allergies    Current Inpatient Medications:      No current facility-administered medications on file prior to encounter.     Current Outpatient Medications on File Prior to Encounter   Medication Sig Dispense Refill    amLODIPine (NORVASC) 5 MG tablet once daily.      cyclobenzaprine (FLEXERIL) 10 MG tablet Take 10 mg by mouth 2 (two) times daily as needed.      folic acid (FOLVITE) 400 MCG tablet Take 400 mcg by mouth once daily.      gabapentin (NEURONTIN) 300 MG capsule Take 300 mg by mouth 3 (three) times daily.      ibuprofen (ADVIL,MOTRIN) 800 MG tablet Take 800 mg by mouth every 6 (six) hours as needed.      pantoprazole (PROTONIX) 40 MG tablet Take 40 mg by mouth once daily.      tiZANidine (ZANAFLEX) 4 MG tablet Take 4-8 mg by mouth once daily.         Past Surgical History:   Procedure Laterality Date    ELBOW SURGERY Left 1994    [facility] in [place]        Social History     Socioeconomic History    Marital status: Single   Tobacco Use    Smoking status:  Current Every Day Smoker    Smokeless tobacco: Current User   Substance and Sexual Activity    Alcohol use: Yes     Comment: pt drinks moonshine daily    Drug use: No    Sexual activity: Yes     Partners: Female       OBJECTIVE:     Vital Signs Range (Last 24H):         Significant Labs:  No results found for: WBC, HGB, HCT, PLT, CHOL, TRIG, HDL, LDLDIRECT, ALT, AST, NA, K, CL, CREATININE, BUN, CO2, TSH, PSA, INR, GLUF, HGBA1C, MICROALBUR    Diagnostic Studies: No relevant studies.    EKG: No results found for this or any previous visit.    ECHOCARDIOGRAM:  TTE:  No results found for this or any previous visit.        ASSESSMENT/PLAN:           Pre-op Assessment    I have reviewed the Patient Summary Reports.     I have reviewed the Nursing Notes. I have reviewed the NPO Status.   I have reviewed the Medications.     Review of Systems  Anesthesia Hx:  No problems with previous Anesthesia  Denies Family Hx of Anesthesia complications.   Denies Personal Hx of Anesthesia complications.   Social:  Smoker, Alcohol Use    Hematology/Oncology:         -- Denies Anemia: Denies Current/Recent Cancer   EENT/Dental:EENT/Dental Normal   Cardiovascular:   Hypertension ALSTON    Pulmonary:  Pulmonary Normal    Renal/:  Renal/ Normal     Hepatic/GI:   GERD    Neurological:  Neurology Normal        Physical Exam  General: Well nourished    Airway:  Mallampati: III   Mouth Opening: Normal  TM Distance: Normal  Tongue: Normal  Neck ROM: Normal ROM    Dental:  Intact        Anesthesia Plan  Type of Anesthesia, risks & benefits discussed:    Anesthesia Type: Gen ETT  Intra-op Monitoring Plan: Standard ASA Monitors and Art Line  Post Op Pain Control Plan: multimodal analgesia and IV/PO Opioids PRN  Induction:  IV  Airway Plan: Video, Post-Induction with ramp  Informed Consent: Informed consent signed with the Patient and all parties understand the risks and agree with anesthesia plan.  All questions answered. Patient consented to  blood products? Yes  ASA Score: 2  Day of Surgery Review of History & Physical: H&P Update referred to the surgeon/provider.  Anesthesia Plan Notes: Discussed plan for General endotracheal anesthesia    Ready For Surgery From Anesthesia Perspective.     .

## 2022-04-05 NOTE — TELEPHONE ENCOUNTER
I contacted the pt and informed him that he will have to repeat his covid test if the one he has taken has not been resulted in time of his scheduled procedure with .  Patient contacted. Advised to arrive for surgery at 6am for 8am start, 2nd floor DOSC, NPO after midnight the night before, shower x 2 with Hibiclens or Dial antibacterial soap. Understanding verbalized by patient.    ----- Message from T[place]ana Chevalier sent at 4/5/2022  8:07 AM CDT -----  Regarding: pt advice  Contact: pt @ [phone]  Pt calling to see if the COVID 19 self test is acceptable for his procedure tomorrow. Please call.

## 2022-04-06 ENCOUNTER — ANESTHESIA (OUTPATIENT)
Dept: SURGERY | Facility: HOSPITAL | Age: 52
DRG: 454 | End: 2022-04-06
Payer: MEDICAID

## 2022-04-06 ENCOUNTER — HOSPITAL ENCOUNTER (INPATIENT)
Facility: HOSPITAL | Age: 52
LOS: 5 days | Discharge: HOME | DRG: 454 | End: 2022-04-11
Attending: NEUROLOGICAL SURGERY | Admitting: NEUROLOGICAL SURGERY
Payer: MEDICAID

## 2022-04-06 DIAGNOSIS — M43.16 SPONDYLOLISTHESIS OF LUMBAR REGION: ICD-10-CM

## 2022-04-06 DIAGNOSIS — M43.17 SPONDYLOLISTHESIS OF LUMBOSACRAL REGION: Primary | ICD-10-CM

## 2022-04-06 LAB
ABO + RH BLD: NORMAL
ANION GAP SERPL CALC-SCNC: 6 MMOL/L (ref 8–16)
ANION GAP SERPL CALC-SCNC: 8 MMOL/L (ref 8–16)
APTT BLDCRRT: 29.6 SEC (ref 21–32)
BASOPHILS # BLD AUTO: 0.02 K/UL (ref 0–0.2)
BASOPHILS # BLD AUTO: 0.03 K/UL (ref 0–0.2)
BASOPHILS NFR BLD: 0.1 % (ref 0–1.9)
BASOPHILS NFR BLD: 0.4 % (ref 0–1.9)
BLD GP AB SCN CELLS X3 SERPL QL: NORMAL
BUN SERPL-MCNC: 10 MG/DL (ref 6–20)
BUN SERPL-MCNC: 7 MG/DL (ref 6–20)
CALCIUM SERPL-MCNC: 7.4 MG/DL (ref 8.7–10.5)
CALCIUM SERPL-MCNC: 8.8 MG/DL (ref 8.7–10.5)
CHLORIDE SERPL-SCNC: 105 MMOL/L (ref 95–110)
CHLORIDE SERPL-SCNC: 113 MMOL/L (ref 95–110)
CO2 SERPL-SCNC: 22 MMOL/L (ref 23–29)
CO2 SERPL-SCNC: 24 MMOL/L (ref 23–29)
CREAT SERPL-MCNC: 0.8 MG/DL (ref 0.5–1.4)
CREAT SERPL-MCNC: 0.9 MG/DL (ref 0.5–1.4)
DIFFERENTIAL METHOD: ABNORMAL
DIFFERENTIAL METHOD: ABNORMAL
EOSINOPHIL # BLD AUTO: 0 K/UL (ref 0–0.5)
EOSINOPHIL # BLD AUTO: 0.1 K/UL (ref 0–0.5)
EOSINOPHIL NFR BLD: 0.1 % (ref 0–8)
EOSINOPHIL NFR BLD: 1.7 % (ref 0–8)
ERYTHROCYTE [DISTWIDTH] IN BLOOD BY AUTOMATED COUNT: 13.7 % (ref 11.5–14.5)
ERYTHROCYTE [DISTWIDTH] IN BLOOD BY AUTOMATED COUNT: 14.3 % (ref 11.5–14.5)
EST. GFR  (AFRICAN AMERICAN): >60 ML/MIN/1.73 M^2
EST. GFR  (AFRICAN AMERICAN): >60 ML/MIN/1.73 M^2
EST. GFR  (NON AFRICAN AMERICAN): >60 ML/MIN/1.73 M^2
EST. GFR  (NON AFRICAN AMERICAN): >60 ML/MIN/1.73 M^2
GLUCOSE SERPL-MCNC: 107 MG/DL (ref 70–110)
GLUCOSE SERPL-MCNC: 110 MG/DL (ref 70–110)
GLUCOSE SERPL-MCNC: 118 MG/DL (ref 70–110)
GLUCOSE SERPL-MCNC: 123 MG/DL (ref 70–110)
GLUCOSE SERPL-MCNC: 154 MG/DL (ref 70–110)
HCO3 UR-SCNC: 21.9 MMOL/L (ref 24–28)
HCO3 UR-SCNC: 22.3 MMOL/L (ref 24–28)
HCO3 UR-SCNC: 22.6 MMOL/L (ref 24–28)
HCT VFR BLD AUTO: 36.3 % (ref 40–54)
HCT VFR BLD AUTO: 42.6 % (ref 40–54)
HCT VFR BLD CALC: 35 %PCV (ref 36–54)
HCT VFR BLD CALC: 38 %PCV (ref 36–54)
HCT VFR BLD CALC: 38 %PCV (ref 36–54)
HGB BLD-MCNC: 12.1 G/DL (ref 14–18)
HGB BLD-MCNC: 14.1 G/DL (ref 14–18)
IMM GRANULOCYTES # BLD AUTO: 0.02 K/UL (ref 0–0.04)
IMM GRANULOCYTES # BLD AUTO: 0.08 K/UL (ref 0–0.04)
IMM GRANULOCYTES NFR BLD AUTO: 0.3 % (ref 0–0.5)
IMM GRANULOCYTES NFR BLD AUTO: 0.5 % (ref 0–0.5)
INR PPP: 1 (ref 0.8–1.2)
LYMPHOCYTES # BLD AUTO: 1.6 K/UL (ref 1–4.8)
LYMPHOCYTES # BLD AUTO: 2.2 K/UL (ref 1–4.8)
LYMPHOCYTES NFR BLD: 28.3 % (ref 18–48)
LYMPHOCYTES NFR BLD: 9.3 % (ref 18–48)
MCH RBC QN AUTO: 31.3 PG (ref 27–31)
MCH RBC QN AUTO: 32 PG (ref 27–31)
MCHC RBC AUTO-ENTMCNC: 33.1 G/DL (ref 32–36)
MCHC RBC AUTO-ENTMCNC: 33.3 G/DL (ref 32–36)
MCV RBC AUTO: 95 FL (ref 82–98)
MCV RBC AUTO: 96 FL (ref 82–98)
MONOCYTES # BLD AUTO: 0.7 K/UL (ref 0.3–1)
MONOCYTES # BLD AUTO: 1.6 K/UL (ref 0.3–1)
MONOCYTES NFR BLD: 9.1 % (ref 4–15)
MONOCYTES NFR BLD: 9.6 % (ref 4–15)
NEUTROPHILS # BLD AUTO: 13.7 K/UL (ref 1.8–7.7)
NEUTROPHILS # BLD AUTO: 4.6 K/UL (ref 1.8–7.7)
NEUTROPHILS NFR BLD: 60.2 % (ref 38–73)
NEUTROPHILS NFR BLD: 80.4 % (ref 38–73)
NRBC BLD-RTO: 0 /100 WBC
NRBC BLD-RTO: 0 /100 WBC
PCO2 BLDA: 39.5 MMHG (ref 35–45)
PCO2 BLDA: 40.4 MMHG (ref 35–45)
PCO2 BLDA: 40.8 MMHG (ref 35–45)
PH SMN: 7.35 [PH] (ref 7.35–7.45)
PLATELET # BLD AUTO: 147 K/UL (ref 150–450)
PLATELET # BLD AUTO: 207 K/UL (ref 150–450)
PMV BLD AUTO: 10.4 FL (ref 9.2–12.9)
PMV BLD AUTO: 10.8 FL (ref 9.2–12.9)
PO2 BLDA: 132 MMHG (ref 80–100)
PO2 BLDA: 147 MMHG (ref 80–100)
PO2 BLDA: 324 MMHG (ref 80–100)
POC BE: -3 MMOL/L
POC BE: -3 MMOL/L
POC BE: -4 MMOL/L
POC IONIZED CALCIUM: 1.12 MMOL/L (ref 1.06–1.42)
POC IONIZED CALCIUM: 1.12 MMOL/L (ref 1.06–1.42)
POC IONIZED CALCIUM: 1.15 MMOL/L (ref 1.06–1.42)
POC SATURATED O2: 100 % (ref 95–100)
POC SATURATED O2: 99 % (ref 95–100)
POC SATURATED O2: 99 % (ref 95–100)
POC TCO2: 23 MMOL/L (ref 23–27)
POC TCO2: 24 MMOL/L (ref 23–27)
POC TCO2: 24 MMOL/L (ref 23–27)
POTASSIUM BLD-SCNC: 3.9 MMOL/L (ref 3.5–5.1)
POTASSIUM BLD-SCNC: 4.1 MMOL/L (ref 3.5–5.1)
POTASSIUM BLD-SCNC: 4.4 MMOL/L (ref 3.5–5.1)
POTASSIUM SERPL-SCNC: 4 MMOL/L (ref 3.5–5.1)
POTASSIUM SERPL-SCNC: 4.4 MMOL/L (ref 3.5–5.1)
PROTHROMBIN TIME: 10.3 SEC (ref 9–12.5)
RBC # BLD AUTO: 3.78 M/UL (ref 4.6–6.2)
RBC # BLD AUTO: 4.5 M/UL (ref 4.6–6.2)
SAMPLE: ABNORMAL
SODIUM BLD-SCNC: 140 MMOL/L (ref 136–145)
SODIUM BLD-SCNC: 141 MMOL/L (ref 136–145)
SODIUM BLD-SCNC: 141 MMOL/L (ref 136–145)
SODIUM SERPL-SCNC: 137 MMOL/L (ref 136–145)
SODIUM SERPL-SCNC: 141 MMOL/L (ref 136–145)
WBC # BLD AUTO: 17.03 K/UL (ref 3.9–12.7)
WBC # BLD AUTO: 7.62 K/UL (ref 3.9–12.7)

## 2022-04-06 PROCEDURE — 63600175 PHARM REV CODE 636 W HCPCS: Performed by: STUDENT IN AN ORGANIZED HEALTH CARE EDUCATION/TRAINING PROGRAM

## 2022-04-06 PROCEDURE — 22214: ICD-10-PCS | Mod: 62,51,, | Performed by: NEUROLOGICAL SURGERY

## 2022-04-06 PROCEDURE — 71000015 HC POSTOP RECOV 1ST HR: Performed by: NEUROLOGICAL SURGERY

## 2022-04-06 PROCEDURE — 22842 INSERT SPINE FIXATION DEVICE: CPT | Mod: 80,,, | Performed by: STUDENT IN AN ORGANIZED HEALTH CARE EDUCATION/TRAINING PROGRAM

## 2022-04-06 PROCEDURE — 22214 INCIS 1 VERTEBRAL SEG LUMBAR: CPT | Mod: 62,51,, | Performed by: NEUROLOGICAL SURGERY

## 2022-04-06 PROCEDURE — 22634 PR ARTHRODESIS, COMBINED TECHN, SNGL INTERSPACE, EA ADDTL: ICD-10-PCS | Mod: 62,,, | Performed by: STUDENT IN AN ORGANIZED HEALTH CARE EDUCATION/TRAINING PROGRAM

## 2022-04-06 PROCEDURE — 94761 N-INVAS EAR/PLS OXIMETRY MLT: CPT

## 2022-04-06 PROCEDURE — 63053 PR LAMINECT/FACETECT/FORAMINOT, ARTHRODESIS, EA ADDTL VERTEBR SEGM: ICD-10-PCS | Mod: 62,,, | Performed by: NEUROLOGICAL SURGERY

## 2022-04-06 PROCEDURE — 25000003 PHARM REV CODE 250: Performed by: NEUROLOGICAL SURGERY

## 2022-04-06 PROCEDURE — 85730 THROMBOPLASTIN TIME PARTIAL: CPT | Performed by: STUDENT IN AN ORGANIZED HEALTH CARE EDUCATION/TRAINING PROGRAM

## 2022-04-06 PROCEDURE — 20936 PR AUTOGRAFT SPINE SURGERY LOCAL FROM SAME INCISION: ICD-10-PCS | Mod: ,,, | Performed by: NEUROLOGICAL SURGERY

## 2022-04-06 PROCEDURE — 20930 PR ALLOGRAFT FOR SPINE SURGERY ONLY MORSELIZED: ICD-10-PCS | Mod: ,,, | Performed by: NEUROLOGICAL SURGERY

## 2022-04-06 PROCEDURE — 20930 SP BONE ALGRFT MORSEL ADD-ON: CPT | Mod: ,,, | Performed by: NEUROLOGICAL SURGERY

## 2022-04-06 PROCEDURE — 22634 PR ARTHRODESIS, COMBINED TECHN, SNGL INTERSPACE, EA ADDTL: ICD-10-PCS | Mod: 62,,, | Performed by: NEUROLOGICAL SURGERY

## 2022-04-06 PROCEDURE — 85610 PROTHROMBIN TIME: CPT | Performed by: STUDENT IN AN ORGANIZED HEALTH CARE EDUCATION/TRAINING PROGRAM

## 2022-04-06 PROCEDURE — 25000003 PHARM REV CODE 250: Performed by: STUDENT IN AN ORGANIZED HEALTH CARE EDUCATION/TRAINING PROGRAM

## 2022-04-06 PROCEDURE — 22214 INCIS 1 VERTEBRAL SEG LUMBAR: CPT | Mod: 62,51,, | Performed by: STUDENT IN AN ORGANIZED HEALTH CARE EDUCATION/TRAINING PROGRAM

## 2022-04-06 PROCEDURE — 22614 PR ARTHRODESIS, POST/POSTLAT, SNGL INTERSPACE, EA ADDTL: ICD-10-PCS | Mod: 62,,, | Performed by: NEUROLOGICAL SURGERY

## 2022-04-06 PROCEDURE — 71000016 HC POSTOP RECOV ADDL HR: Performed by: NEUROLOGICAL SURGERY

## 2022-04-06 PROCEDURE — C1729 CATH, DRAINAGE: HCPCS | Performed by: NEUROLOGICAL SURGERY

## 2022-04-06 PROCEDURE — 22842 PR POSTERIOR SEGMENTAL INSTRUMENTATION 3-6 VRT SEG: ICD-10-PCS | Mod: ,,, | Performed by: NEUROLOGICAL SURGERY

## 2022-04-06 PROCEDURE — 22634 ARTHRD CMBN 1NTRSPC EA ADDL: CPT | Mod: 62,,, | Performed by: NEUROLOGICAL SURGERY

## 2022-04-06 PROCEDURE — P9021 RED BLOOD CELLS UNIT: HCPCS | Performed by: NEUROLOGICAL SURGERY

## 2022-04-06 PROCEDURE — 22633 ARTHRD CMBN 1NTRSPC LUMBAR: CPT | Mod: 22,62,, | Performed by: NEUROLOGICAL SURGERY

## 2022-04-06 PROCEDURE — 37000009 HC ANESTHESIA EA ADD 15 MINS: Performed by: NEUROLOGICAL SURGERY

## 2022-04-06 PROCEDURE — 22614 ARTHRD PST TQ 1NTRSPC EA ADD: CPT | Mod: 62,,, | Performed by: NEUROLOGICAL SURGERY

## 2022-04-06 PROCEDURE — 22214: ICD-10-PCS | Mod: 62,51,, | Performed by: STUDENT IN AN ORGANIZED HEALTH CARE EDUCATION/TRAINING PROGRAM

## 2022-04-06 PROCEDURE — 27201423 OPTIME MED/SURG SUP & DEVICES STERILE SUPPLY: Performed by: NEUROLOGICAL SURGERY

## 2022-04-06 PROCEDURE — 22634 ARTHRD CMBN 1NTRSPC EA ADDL: CPT | Mod: 62,,, | Performed by: STUDENT IN AN ORGANIZED HEALTH CARE EDUCATION/TRAINING PROGRAM

## 2022-04-06 PROCEDURE — 63053 LAM FACTC/FRMT ARTHRD LUM EA: CPT | Mod: 62,,, | Performed by: STUDENT IN AN ORGANIZED HEALTH CARE EDUCATION/TRAINING PROGRAM

## 2022-04-06 PROCEDURE — 22853 PR INSERT BIOMECH DEV W/INTERBODY ARTHRODESIS, EA CONTIGUOUS DEFECT: ICD-10-PCS | Mod: 80,,, | Performed by: STUDENT IN AN ORGANIZED HEALTH CARE EDUCATION/TRAINING PROGRAM

## 2022-04-06 PROCEDURE — 61783 SCAN PROC SPINAL: CPT | Mod: 59,,, | Performed by: NEUROLOGICAL SURGERY

## 2022-04-06 PROCEDURE — C1713 ANCHOR/SCREW BN/BN,TIS/BN: HCPCS | Performed by: NEUROLOGICAL SURGERY

## 2022-04-06 PROCEDURE — 22853 PR INSERT BIOMECH DEV W/INTERBODY ARTHRODESIS, EA CONTIGUOUS DEFECT: ICD-10-PCS | Mod: ,,, | Performed by: NEUROLOGICAL SURGERY

## 2022-04-06 PROCEDURE — 11000001 HC ACUTE MED/SURG PRIVATE ROOM

## 2022-04-06 PROCEDURE — 27201037 HC PRESSURE MONITORING SET UP

## 2022-04-06 PROCEDURE — 27000221 HC OXYGEN, UP TO 24 HOURS

## 2022-04-06 PROCEDURE — 61783 PR STEREOTACTIC COMP ASSIST PROC,SPINAL: ICD-10-PCS | Mod: 59,,, | Performed by: NEUROLOGICAL SURGERY

## 2022-04-06 PROCEDURE — 36430 TRANSFUSION BLD/BLD COMPNT: CPT

## 2022-04-06 PROCEDURE — C1769 GUIDE WIRE: HCPCS | Performed by: NEUROLOGICAL SURGERY

## 2022-04-06 PROCEDURE — 52000 CYSTOURETHROSCOPY: CPT | Mod: ,,, | Performed by: UROLOGY

## 2022-04-06 PROCEDURE — 52000 PR CYSTOURETHROSCOPY: ICD-10-PCS | Mod: ,,, | Performed by: UROLOGY

## 2022-04-06 PROCEDURE — 63053 PR LAMINECT/FACETECT/FORAMINOT, ARTHRODESIS, EA ADDTL VERTEBR SEGM: ICD-10-PCS | Mod: 62,,, | Performed by: STUDENT IN AN ORGANIZED HEALTH CARE EDUCATION/TRAINING PROGRAM

## 2022-04-06 PROCEDURE — 22853 INSJ BIOMECHANICAL DEVICE: CPT | Mod: 80,,, | Performed by: STUDENT IN AN ORGANIZED HEALTH CARE EDUCATION/TRAINING PROGRAM

## 2022-04-06 PROCEDURE — D9220A PRA ANESTHESIA: Mod: ,,, | Performed by: ANESTHESIOLOGY

## 2022-04-06 PROCEDURE — 80048 BASIC METABOLIC PNL TOTAL CA: CPT | Mod: 91 | Performed by: STUDENT IN AN ORGANIZED HEALTH CARE EDUCATION/TRAINING PROGRAM

## 2022-04-06 PROCEDURE — 63052 PR LAMINECT/FACETECT/FORAMINOT, ARTHRODESIS, 1 VERTEBR SEGM: ICD-10-PCS | Mod: 62,59,, | Performed by: NEUROLOGICAL SURGERY

## 2022-04-06 PROCEDURE — 63052 PR LAMINECT/FACETECT/FORAMINOT, ARTHRODESIS, 1 VERTEBR SEGM: ICD-10-PCS | Mod: 62,59,, | Performed by: STUDENT IN AN ORGANIZED HEALTH CARE EDUCATION/TRAINING PROGRAM

## 2022-04-06 PROCEDURE — 86920 COMPATIBILITY TEST SPIN: CPT | Performed by: NEUROLOGICAL SURGERY

## 2022-04-06 PROCEDURE — 63600175 PHARM REV CODE 636 W HCPCS: Performed by: NEUROLOGICAL SURGERY

## 2022-04-06 PROCEDURE — 22633 PR ARTHRODESIS, COMBINED TECHN, SNGL INTERSPACE, LUMBAR: ICD-10-PCS | Mod: 22,62,, | Performed by: STUDENT IN AN ORGANIZED HEALTH CARE EDUCATION/TRAINING PROGRAM

## 2022-04-06 PROCEDURE — 85025 COMPLETE CBC W/AUTO DIFF WBC: CPT | Mod: 91 | Performed by: STUDENT IN AN ORGANIZED HEALTH CARE EDUCATION/TRAINING PROGRAM

## 2022-04-06 PROCEDURE — 71000033 HC RECOVERY, INTIAL HOUR: Performed by: NEUROLOGICAL SURGERY

## 2022-04-06 PROCEDURE — 20936 SP BONE AGRFT LOCAL ADD-ON: CPT | Mod: ,,, | Performed by: NEUROLOGICAL SURGERY

## 2022-04-06 PROCEDURE — 63052 LAM FACETC/FRMT ARTHRD LUM 1: CPT | Mod: 62,59,, | Performed by: STUDENT IN AN ORGANIZED HEALTH CARE EDUCATION/TRAINING PROGRAM

## 2022-04-06 PROCEDURE — 63053 LAM FACTC/FRMT ARTHRD LUM EA: CPT | Mod: 62,,, | Performed by: NEUROLOGICAL SURGERY

## 2022-04-06 PROCEDURE — 22853 INSJ BIOMECHANICAL DEVICE: CPT | Mod: ,,, | Performed by: NEUROLOGICAL SURGERY

## 2022-04-06 PROCEDURE — 22633 PR ARTHRODESIS, COMBINED TECHN, SNGL INTERSPACE, LUMBAR: ICD-10-PCS | Mod: 22,62,, | Performed by: NEUROLOGICAL SURGERY

## 2022-04-06 PROCEDURE — 22633 ARTHRD CMBN 1NTRSPC LUMBAR: CPT | Mod: 22,62,, | Performed by: STUDENT IN AN ORGANIZED HEALTH CARE EDUCATION/TRAINING PROGRAM

## 2022-04-06 PROCEDURE — 27800903 OPTIME MED/SURG SUP & DEVICES OTHER IMPLANTS: Performed by: NEUROLOGICAL SURGERY

## 2022-04-06 PROCEDURE — 36620 INSERTION CATHETER ARTERY: CPT | Mod: 59,,, | Performed by: ANESTHESIOLOGY

## 2022-04-06 PROCEDURE — 37000008 HC ANESTHESIA 1ST 15 MINUTES: Performed by: NEUROLOGICAL SURGERY

## 2022-04-06 PROCEDURE — 63052 LAM FACETC/FRMT ARTHRD LUM 1: CPT | Mod: 62,59,, | Performed by: NEUROLOGICAL SURGERY

## 2022-04-06 PROCEDURE — 22614 PR ARTHRODESIS, POST/POSTLAT, SNGL INTERSPACE, EA ADDTL: ICD-10-PCS | Mod: 62,,, | Performed by: STUDENT IN AN ORGANIZED HEALTH CARE EDUCATION/TRAINING PROGRAM

## 2022-04-06 PROCEDURE — 86901 BLOOD TYPING SEROLOGIC RH(D): CPT | Performed by: STUDENT IN AN ORGANIZED HEALTH CARE EDUCATION/TRAINING PROGRAM

## 2022-04-06 PROCEDURE — 36000713 HC OR TIME LEV V EA ADD 15 MIN: Performed by: NEUROLOGICAL SURGERY

## 2022-04-06 PROCEDURE — 22842 PR POSTERIOR SEGMENTAL INSTRUMENTATION 3-6 VRT SEG: ICD-10-PCS | Mod: 80,,, | Performed by: STUDENT IN AN ORGANIZED HEALTH CARE EDUCATION/TRAINING PROGRAM

## 2022-04-06 PROCEDURE — 22842 INSERT SPINE FIXATION DEVICE: CPT | Mod: ,,, | Performed by: NEUROLOGICAL SURGERY

## 2022-04-06 PROCEDURE — D9220A PRA ANESTHESIA: ICD-10-PCS | Mod: ,,, | Performed by: ANESTHESIOLOGY

## 2022-04-06 PROCEDURE — 63600175 PHARM REV CODE 636 W HCPCS

## 2022-04-06 PROCEDURE — 22614 ARTHRD PST TQ 1NTRSPC EA ADD: CPT | Mod: 62,,, | Performed by: STUDENT IN AN ORGANIZED HEALTH CARE EDUCATION/TRAINING PROGRAM

## 2022-04-06 PROCEDURE — 36620 ARTERIAL: ICD-10-PCS | Mod: 59,,, | Performed by: ANESTHESIOLOGY

## 2022-04-06 PROCEDURE — 36000712 HC OR TIME LEV V 1ST 15 MIN: Performed by: NEUROLOGICAL SURGERY

## 2022-04-06 DEVICE — SET SCREW EXPEDIUM VERSE UNITZ: Type: IMPLANTABLE DEVICE | Site: SPINE LUMBAR | Status: FUNCTIONAL

## 2022-04-06 DEVICE — SCREW EXPEDIUM VERSE PA 8X80MM: Type: IMPLANTABLE DEVICE | Site: SPINE LUMBAR | Status: FUNCTIONAL

## 2022-04-06 DEVICE — CAGE LEVA 10MM 25X10X10: Type: IMPLANTABLE DEVICE | Site: SPINE LUMBAR | Status: FUNCTIONAL

## 2022-04-06 DEVICE — SCREW BONE SPINAL 5.5 6 X 45MM: Type: IMPLANTABLE DEVICE | Site: SPINE LUMBAR | Status: FUNCTIONAL

## 2022-04-06 DEVICE — SCREW INNER SINGLE SET TITANIU: Type: IMPLANTABLE DEVICE | Site: SPINE LUMBAR | Status: FUNCTIONAL

## 2022-04-06 DEVICE — CAGE SPINAL EXPAND 25X10X13: Type: IMPLANTABLE DEVICE | Site: SPINE LUMBAR | Status: FUNCTIONAL

## 2022-04-06 DEVICE — GRAFT ALTAPORE BIOACTIVE 10ML: Type: IMPLANTABLE DEVICE | Site: SPINE LUMBAR | Status: FUNCTIONAL

## 2022-04-06 DEVICE — IMPLANTABLE DEVICE: Type: IMPLANTABLE DEVICE | Site: SPINE LUMBAR | Status: FUNCTIONAL

## 2022-04-06 DEVICE — SCREW EXPEDIUM VERSE PA 7X45MM: Type: IMPLANTABLE DEVICE | Site: SPINE LUMBAR | Status: FUNCTIONAL

## 2022-04-06 RX ORDER — MUPIROCIN 20 MG/G
OINTMENT TOPICAL
Status: DISCONTINUED | OUTPATIENT
Start: 2022-04-06 | End: 2022-04-06 | Stop reason: HOSPADM

## 2022-04-06 RX ORDER — HYDROCODONE BITARTRATE AND ACETAMINOPHEN 10; 325 MG/1; MG/1
1 TABLET ORAL EVERY 4 HOURS PRN
Status: DISCONTINUED | OUTPATIENT
Start: 2022-04-06 | End: 2022-04-07

## 2022-04-06 RX ORDER — PROCHLORPERAZINE EDISYLATE 5 MG/ML
5 INJECTION INTRAMUSCULAR; INTRAVENOUS EVERY 6 HOURS PRN
Status: DISCONTINUED | OUTPATIENT
Start: 2022-04-06 | End: 2022-04-11 | Stop reason: HOSPADM

## 2022-04-06 RX ORDER — HYDROMORPHONE HYDROCHLORIDE 1 MG/ML
0.2 INJECTION, SOLUTION INTRAMUSCULAR; INTRAVENOUS; SUBCUTANEOUS EVERY 5 MIN PRN
Status: DISCONTINUED | OUTPATIENT
Start: 2022-04-06 | End: 2022-04-07

## 2022-04-06 RX ORDER — LIDOCAINE HYDROCHLORIDE 20 MG/ML
INJECTION, SOLUTION EPIDURAL; INFILTRATION; INTRACAUDAL; PERINEURAL
Status: DISCONTINUED | OUTPATIENT
Start: 2022-04-06 | End: 2022-04-06

## 2022-04-06 RX ORDER — OXYCODONE AND ACETAMINOPHEN 10; 325 MG/1; MG/1
1 TABLET ORAL EVERY 4 HOURS PRN
Status: DISCONTINUED | OUTPATIENT
Start: 2022-04-06 | End: 2022-04-07

## 2022-04-06 RX ORDER — HALOPERIDOL 5 MG/ML
0.5 INJECTION INTRAMUSCULAR EVERY 10 MIN PRN
Status: DISCONTINUED | OUTPATIENT
Start: 2022-04-06 | End: 2022-04-06 | Stop reason: HOSPADM

## 2022-04-06 RX ORDER — ONDANSETRON 8 MG/1
8 TABLET, ORALLY DISINTEGRATING ORAL EVERY 6 HOURS PRN
Status: DISCONTINUED | OUTPATIENT
Start: 2022-04-06 | End: 2022-04-11 | Stop reason: HOSPADM

## 2022-04-06 RX ORDER — MUPIROCIN 20 MG/G
OINTMENT TOPICAL 2 TIMES DAILY
Status: DISCONTINUED | OUTPATIENT
Start: 2022-04-06 | End: 2022-04-11 | Stop reason: HOSPADM

## 2022-04-06 RX ORDER — DEXAMETHASONE SODIUM PHOSPHATE 4 MG/ML
INJECTION, SOLUTION INTRA-ARTICULAR; INTRALESIONAL; INTRAMUSCULAR; INTRAVENOUS; SOFT TISSUE
Status: DISCONTINUED | OUTPATIENT
Start: 2022-04-06 | End: 2022-04-06

## 2022-04-06 RX ORDER — ACETAMINOPHEN 325 MG/1
650 TABLET ORAL EVERY 4 HOURS PRN
Status: DISCONTINUED | OUTPATIENT
Start: 2022-04-06 | End: 2022-04-07

## 2022-04-06 RX ORDER — ACETAMINOPHEN 500 MG
1000 TABLET ORAL
Status: COMPLETED | OUTPATIENT
Start: 2022-04-06 | End: 2022-04-06

## 2022-04-06 RX ORDER — ACETAMINOPHEN 325 MG/1
650 TABLET ORAL EVERY 6 HOURS PRN
Status: DISCONTINUED | OUTPATIENT
Start: 2022-04-06 | End: 2022-04-07

## 2022-04-06 RX ORDER — FENTANYL CITRATE 50 UG/ML
INJECTION, SOLUTION INTRAMUSCULAR; INTRAVENOUS
Status: DISCONTINUED | OUTPATIENT
Start: 2022-04-06 | End: 2022-04-06

## 2022-04-06 RX ORDER — DIAZEPAM 10 MG/2ML
5 INJECTION INTRAMUSCULAR EVERY 6 HOURS PRN
Status: DISCONTINUED | OUTPATIENT
Start: 2022-04-06 | End: 2022-04-07

## 2022-04-06 RX ORDER — CEFAZOLIN SODIUM/D5W 2 G/100 ML
2 PLASTIC BAG, INJECTION (ML) INTRAVENOUS
Status: DISCONTINUED | OUTPATIENT
Start: 2022-04-06 | End: 2022-04-11

## 2022-04-06 RX ORDER — AMLODIPINE BESYLATE 10 MG/1
10 TABLET ORAL DAILY
Status: DISCONTINUED | OUTPATIENT
Start: 2022-04-07 | End: 2022-04-11 | Stop reason: HOSPADM

## 2022-04-06 RX ORDER — HYDROMORPHONE HYDROCHLORIDE 1 MG/ML
INJECTION, SOLUTION INTRAMUSCULAR; INTRAVENOUS; SUBCUTANEOUS
Status: COMPLETED
Start: 2022-04-06 | End: 2022-04-06

## 2022-04-06 RX ORDER — AMOXICILLIN 250 MG
2 CAPSULE ORAL NIGHTLY PRN
Status: DISCONTINUED | OUTPATIENT
Start: 2022-04-06 | End: 2022-04-11 | Stop reason: HOSPADM

## 2022-04-06 RX ORDER — CEFAZOLIN SODIUM/WATER 2 G/20 ML
2 SYRINGE (ML) INTRAVENOUS
Status: COMPLETED | OUTPATIENT
Start: 2022-04-06 | End: 2022-04-06

## 2022-04-06 RX ORDER — ROCURONIUM BROMIDE 10 MG/ML
INJECTION, SOLUTION INTRAVENOUS
Status: DISCONTINUED | OUTPATIENT
Start: 2022-04-06 | End: 2022-04-06

## 2022-04-06 RX ORDER — CYCLOBENZAPRINE HCL 10 MG
10 TABLET ORAL 2 TIMES DAILY PRN
Status: DISCONTINUED | OUTPATIENT
Start: 2022-04-06 | End: 2022-04-07

## 2022-04-06 RX ORDER — PROPOFOL 10 MG/ML
VIAL (ML) INTRAVENOUS CONTINUOUS PRN
Status: DISCONTINUED | OUTPATIENT
Start: 2022-04-06 | End: 2022-04-06

## 2022-04-06 RX ORDER — SODIUM CHLORIDE 0.9 % (FLUSH) 0.9 %
10 SYRINGE (ML) INJECTION
Status: DISCONTINUED | OUTPATIENT
Start: 2022-04-06 | End: 2022-04-11 | Stop reason: HOSPADM

## 2022-04-06 RX ORDER — SUCCINYLCHOLINE CHLORIDE 20 MG/ML
INJECTION INTRAMUSCULAR; INTRAVENOUS
Status: DISCONTINUED | OUTPATIENT
Start: 2022-04-06 | End: 2022-04-06

## 2022-04-06 RX ORDER — TALC
6 POWDER (GRAM) TOPICAL NIGHTLY PRN
Status: DISCONTINUED | OUTPATIENT
Start: 2022-04-06 | End: 2022-04-11 | Stop reason: HOSPADM

## 2022-04-06 RX ORDER — BISACODYL 10 MG
10 SUPPOSITORY, RECTAL RECTAL DAILY
Status: DISCONTINUED | OUTPATIENT
Start: 2022-04-07 | End: 2022-04-07

## 2022-04-06 RX ORDER — HYDROMORPHONE HYDROCHLORIDE 1 MG/ML
0.2 INJECTION, SOLUTION INTRAMUSCULAR; INTRAVENOUS; SUBCUTANEOUS EVERY 5 MIN PRN
Status: COMPLETED | OUTPATIENT
Start: 2022-04-06 | End: 2022-04-06

## 2022-04-06 RX ORDER — MUPIROCIN 20 MG/G
1 OINTMENT TOPICAL 2 TIMES DAILY
Status: DISCONTINUED | OUTPATIENT
Start: 2022-04-06 | End: 2022-04-06 | Stop reason: HOSPADM

## 2022-04-06 RX ORDER — VANCOMYCIN HYDROCHLORIDE 1 G/20ML
INJECTION, POWDER, LYOPHILIZED, FOR SOLUTION INTRAVENOUS
Status: DISCONTINUED | OUTPATIENT
Start: 2022-04-06 | End: 2022-04-06 | Stop reason: HOSPADM

## 2022-04-06 RX ORDER — MIDAZOLAM HYDROCHLORIDE 1 MG/ML
INJECTION, SOLUTION INTRAMUSCULAR; INTRAVENOUS
Status: DISCONTINUED | OUTPATIENT
Start: 2022-04-06 | End: 2022-04-06

## 2022-04-06 RX ORDER — MORPHINE SULFATE 2 MG/ML
2 INJECTION, SOLUTION INTRAMUSCULAR; INTRAVENOUS
Status: DISCONTINUED | OUTPATIENT
Start: 2022-04-06 | End: 2022-04-11 | Stop reason: HOSPADM

## 2022-04-06 RX ORDER — ACETAMINOPHEN 10 MG/ML
INJECTION, SOLUTION INTRAVENOUS
Status: DISCONTINUED | OUTPATIENT
Start: 2022-04-06 | End: 2022-04-06

## 2022-04-06 RX ORDER — PROPOFOL 10 MG/ML
VIAL (ML) INTRAVENOUS
Status: DISCONTINUED | OUTPATIENT
Start: 2022-04-06 | End: 2022-04-06

## 2022-04-06 RX ORDER — DEXMEDETOMIDINE HYDROCHLORIDE 100 UG/ML
INJECTION, SOLUTION INTRAVENOUS
Status: DISCONTINUED | OUTPATIENT
Start: 2022-04-06 | End: 2022-04-06

## 2022-04-06 RX ORDER — TIZANIDINE 4 MG/1
4 TABLET ORAL DAILY
Status: DISCONTINUED | OUTPATIENT
Start: 2022-04-07 | End: 2022-04-11 | Stop reason: HOSPADM

## 2022-04-06 RX ORDER — MAG HYDROX/ALUMINUM HYD/SIMETH 200-200-20
30 SUSPENSION, ORAL (FINAL DOSE FORM) ORAL EVERY 4 HOURS PRN
Status: DISCONTINUED | OUTPATIENT
Start: 2022-04-06 | End: 2022-04-11 | Stop reason: HOSPADM

## 2022-04-06 RX ORDER — BUPIVACAINE HYDROCHLORIDE AND EPINEPHRINE 5; 5 MG/ML; UG/ML
INJECTION, SOLUTION EPIDURAL; INTRACAUDAL; PERINEURAL
Status: DISCONTINUED | OUTPATIENT
Start: 2022-04-06 | End: 2022-04-06 | Stop reason: HOSPADM

## 2022-04-06 RX ORDER — CEFTRIAXONE 1 G/1
INJECTION, POWDER, FOR SOLUTION INTRAMUSCULAR; INTRAVENOUS
Status: DISCONTINUED | OUTPATIENT
Start: 2022-04-06 | End: 2022-04-06 | Stop reason: HOSPADM

## 2022-04-06 RX ORDER — HYDROCODONE BITARTRATE AND ACETAMINOPHEN 5; 325 MG/1; MG/1
1 TABLET ORAL EVERY 4 HOURS PRN
Status: DISCONTINUED | OUTPATIENT
Start: 2022-04-06 | End: 2022-04-07

## 2022-04-06 RX ORDER — DIAZEPAM 5 MG/1
5 TABLET ORAL EVERY 6 HOURS PRN
Status: DISCONTINUED | OUTPATIENT
Start: 2022-04-06 | End: 2022-04-11 | Stop reason: HOSPADM

## 2022-04-06 RX ORDER — ONDANSETRON 2 MG/ML
INJECTION INTRAMUSCULAR; INTRAVENOUS
Status: DISCONTINUED | OUTPATIENT
Start: 2022-04-06 | End: 2022-04-06

## 2022-04-06 RX ORDER — GABAPENTIN 300 MG/1
300 CAPSULE ORAL 3 TIMES DAILY
Status: DISCONTINUED | OUTPATIENT
Start: 2022-04-06 | End: 2022-04-11 | Stop reason: HOSPADM

## 2022-04-06 RX ORDER — PHENYLEPHRINE HCL IN 0.9% NACL 1 MG/10 ML
SYRINGE (ML) INTRAVENOUS
Status: DISCONTINUED | OUTPATIENT
Start: 2022-04-06 | End: 2022-04-06

## 2022-04-06 RX ADMIN — HYDROMORPHONE HYDROCHLORIDE 0.2 MG: 1 INJECTION, SOLUTION INTRAMUSCULAR; INTRAVENOUS; SUBCUTANEOUS at 05:04

## 2022-04-06 RX ADMIN — ACETAMINOPHEN 1000 MG: 500 TABLET ORAL at 06:04

## 2022-04-06 RX ADMIN — GABAPENTIN 300 MG: 300 CAPSULE ORAL at 08:04

## 2022-04-06 RX ADMIN — HYDROCODONE BITARTRATE AND ACETAMINOPHEN 1 TABLET: 10; 325 TABLET ORAL at 11:04

## 2022-04-06 RX ADMIN — GLYCOPYRROLATE 0.2 MG: 0.2 INJECTION INTRAMUSCULAR; INTRAVENOUS at 10:04

## 2022-04-06 RX ADMIN — LIDOCAINE HYDROCHLORIDE 100 MG: 20 INJECTION, SOLUTION EPIDURAL; INFILTRATION; INTRACAUDAL; PERINEURAL at 08:04

## 2022-04-06 RX ADMIN — DIAZEPAM 5 MG: 5 INJECTION, SOLUTION INTRAMUSCULAR; INTRAVENOUS at 05:04

## 2022-04-06 RX ADMIN — MORPHINE SULFATE 2 MG: 2 INJECTION, SOLUTION INTRAMUSCULAR; INTRAVENOUS at 11:04

## 2022-04-06 RX ADMIN — SODIUM CHLORIDE, SODIUM LACTATE, POTASSIUM CHLORIDE, AND CALCIUM CHLORIDE: .6; .31; .03; .02 INJECTION, SOLUTION INTRAVENOUS at 08:04

## 2022-04-06 RX ADMIN — Medication 200 MCG: at 08:04

## 2022-04-06 RX ADMIN — HYDROMORPHONE HYDROCHLORIDE 0.2 MG: 1 INJECTION, SOLUTION INTRAMUSCULAR; INTRAVENOUS; SUBCUTANEOUS at 06:04

## 2022-04-06 RX ADMIN — CYCLOBENZAPRINE 10 MG: 10 TABLET, FILM COATED ORAL at 05:04

## 2022-04-06 RX ADMIN — GLYCOPYRROLATE 0.2 MG: 0.2 INJECTION INTRAMUSCULAR; INTRAVENOUS at 09:04

## 2022-04-06 RX ADMIN — MORPHINE SULFATE 2 MG: 2 INJECTION, SOLUTION INTRAMUSCULAR; INTRAVENOUS at 08:04

## 2022-04-06 RX ADMIN — ROCURONIUM BROMIDE 50 MG: 10 INJECTION, SOLUTION INTRAVENOUS at 09:04

## 2022-04-06 RX ADMIN — REMIFENTANIL HYDROCHLORIDE 0.2 MCG/KG/MIN: 1 INJECTION, POWDER, LYOPHILIZED, FOR SOLUTION INTRAVENOUS at 08:04

## 2022-04-06 RX ADMIN — HYDROMORPHONE HYDROCHLORIDE 0.2 MG: 1 INJECTION, SOLUTION INTRAMUSCULAR; INTRAVENOUS; SUBCUTANEOUS at 07:04

## 2022-04-06 RX ADMIN — ACETAMINOPHEN 1000 MG: 10 INJECTION INTRAVENOUS at 01:04

## 2022-04-06 RX ADMIN — MIDAZOLAM HYDROCHLORIDE 2 MG: 1 INJECTION, SOLUTION INTRAMUSCULAR; INTRAVENOUS at 08:04

## 2022-04-06 RX ADMIN — Medication 2 G: at 01:04

## 2022-04-06 RX ADMIN — ROCURONIUM BROMIDE 5 MG: 10 INJECTION, SOLUTION INTRAVENOUS at 08:04

## 2022-04-06 RX ADMIN — Medication 20 MG: at 04:04

## 2022-04-06 RX ADMIN — DEXAMETHASONE SODIUM PHOSPHATE 4 MG: 4 INJECTION INTRA-ARTICULAR; INTRALESIONAL; INTRAMUSCULAR; INTRAVENOUS; SOFT TISSUE at 09:04

## 2022-04-06 RX ADMIN — MUPIROCIN: 20 OINTMENT TOPICAL at 09:04

## 2022-04-06 RX ADMIN — MUPIROCIN: 20 OINTMENT TOPICAL at 06:04

## 2022-04-06 RX ADMIN — ONDANSETRON 4 MG: 2 INJECTION INTRAMUSCULAR; INTRAVENOUS at 03:04

## 2022-04-06 RX ADMIN — SODIUM CHLORIDE 0.2 MCG/KG/MIN: 9 INJECTION, SOLUTION INTRAVENOUS at 08:04

## 2022-04-06 RX ADMIN — SODIUM CHLORIDE, SODIUM GLUCONATE, SODIUM ACETATE, POTASSIUM CHLORIDE, MAGNESIUM CHLORIDE, SODIUM PHOSPHATE, DIBASIC, AND POTASSIUM PHOSPHATE: .53; .5; .37; .037; .03; .012; .00082 INJECTION, SOLUTION INTRAVENOUS at 10:04

## 2022-04-06 RX ADMIN — Medication 40 MG: at 09:04

## 2022-04-06 RX ADMIN — HYDROCODONE BITARTRATE AND ACETAMINOPHEN 1 TABLET: 10; 325 TABLET ORAL at 05:04

## 2022-04-06 RX ADMIN — PROPOFOL 150 MCG/KG/MIN: 10 INJECTION, EMULSION INTRAVENOUS at 08:04

## 2022-04-06 RX ADMIN — FENTANYL CITRATE 100 MCG: 50 INJECTION INTRAMUSCULAR; INTRAVENOUS at 04:04

## 2022-04-06 RX ADMIN — SUCCINYLCHOLINE CHLORIDE 180 MG: 20 INJECTION, SOLUTION INTRAMUSCULAR; INTRAVENOUS; PARENTERAL at 08:04

## 2022-04-06 RX ADMIN — SODIUM CHLORIDE: 0.9 INJECTION, SOLUTION INTRAVENOUS at 08:04

## 2022-04-06 RX ADMIN — FENTANYL CITRATE 100 MCG: 50 INJECTION INTRAMUSCULAR; INTRAVENOUS at 08:04

## 2022-04-06 RX ADMIN — Medication 200 MG: at 08:04

## 2022-04-06 RX ADMIN — ROCURONIUM BROMIDE 40 MG: 10 INJECTION, SOLUTION INTRAVENOUS at 09:04

## 2022-04-06 RX ADMIN — SODIUM CHLORIDE, SODIUM GLUCONATE, SODIUM ACETATE, POTASSIUM CHLORIDE, MAGNESIUM CHLORIDE, SODIUM PHOSPHATE, DIBASIC, AND POTASSIUM PHOSPHATE: .53; .5; .37; .037; .03; .012; .00082 INJECTION, SOLUTION INTRAVENOUS at 08:04

## 2022-04-06 RX ADMIN — Medication 2 G: at 09:04

## 2022-04-06 RX ADMIN — DEXMEDETOMIDINE HYDROCHLORIDE 32 MCG: 100 INJECTION, SOLUTION, CONCENTRATE INTRAVENOUS at 05:04

## 2022-04-06 RX ADMIN — Medication 2 G: at 10:04

## 2022-04-06 RX ADMIN — Medication 100 MCG: at 08:04

## 2022-04-06 RX ADMIN — OXYCODONE HYDROCHLORIDE AND ACETAMINOPHEN 1 TABLET: 10; 325 TABLET ORAL at 08:04

## 2022-04-06 RX ADMIN — SUGAMMADEX 200 MG: 100 INJECTION, SOLUTION INTRAVENOUS at 10:04

## 2022-04-06 RX ADMIN — KETAMINE HYDROCHLORIDE 5 MCG/KG/MIN: 50 INJECTION INTRAMUSCULAR; INTRAVENOUS at 09:04

## 2022-04-06 NOTE — OP NOTE
[facility] ([facility])  Urology Department  Operative Note    Date of Procedure: 4/6/2022     Pre-Operative Diagnosis:   Spondylolisthesis, lumbosacral region [M43.17]    Patient Active Problem List    Diagnosis Date Noted    Spondylolisthesis of lumbosacral region 06/28/2021    Male hypogonadism 03/14/2016    BPH with urinary obstruction 03/14/2016    Erectile dysfunction 03/14/2016     Post-Operative Diagnosis: same and urethral stricture    Procedure:   1. Cystoscopy  2. Insertion of miranda catheter, complicated    Surgeon:  Dominic Iverson MD     Resident assisting: Yossi Mayer MD     Anesthesia: General    Operative Findings:   - 16 Fr silicone, 18 Fr coude, and 12 Fr silicone catheters unable to be placed  - on cystoscopy a mild proximal penile urethral stricture was cannulated with motion wire and the scope was able to passively dilate the stricture and be placed into the bladder    Estimated Blood Loss (EBL): min     Drains: 16 Fr Councill tip miranda    Indications:   Chris Mahmood is a 51 y.o. male undergoing a neurosurgical procedure and was noted to have difficulty in placing the miranda catheter. Urology was consulted to assist with catheter placement.     Description of the Procedure:     The patient was under general anesthesia in the supine position. The urethra was previously prepped with betadine. I attempted a 16 Fr silicone catheter, 18 Fr coude catheter and a 12 Fr silicone catheter and all met resistance proximally and were unable to pass.    A flexible cystoscope was assembled. The urethra was re-prepped. The scope was advanced per the urethra. A large false passage was seen in the proximal penile urethral. At the 6 o'clock position a small true lumen was seen, and a wire was placed through this opening. The scope was then advanced and passively dilated the stricture with gentle pressure. The scope was then able to be advanced into the bladder. The scope was removed leaving the wire  in place. A 16 Fr Councill was placed in standard fashion.    The case was turned back to the primary team.     Disposition: Maintain miranda catheter for 5 days.     Yossi Mayer MD

## 2022-04-06 NOTE — OP NOTE
Date of Procedure: 4/6/2022      Procedure: Procedure(s) (LRB):  FUSION, SPINE, LUMBAR, TLIF, POSTERIOR APPROACH, USING PEDICLE SCREW (N/A)  CYSTOSCOPY, FLEXIBLE with placement of miranda catheter  FUSION, SPINE, POSTERIOR SPINAL COLUMN, LUMBAR, USING COMPUTER-ASSISTED NAVIGATION  LAMINECTOMY, SPINE, LUMBAR      Surgeon(s) and Role:  Panel 1:     * Susanne Tiwari MD - Primary     * Tk España MD - Resident - Assisting     * Gray Yadav DO  Panel 2:     * Dominic Iverson MD - Primary     * Yossi Mayer MD - Resident - Assisting     Pre-Operative Diagnosis: Spondylolisthesis, lumbosacral region [M43.17]     Post-Operative Diagnosis: Post-Op Diagnosis Codes:     * Spondylolisthesis, lumbosacral region [M43.17]     Anesthesia: General     Procedures performed:   1. Posterior arthrodesis and fusion, L3-pelvis (DePuy Verse and Expedium Poly)   2. Transforaminal lumbar interbody fusion L5/S1 (Spine Wave Leva PX 25mm x 10mm x 10mm expandable)  3. Transforaminal lumbar interbody fusion, L4/5(Spine Wave Leva PX 25mm x 10mm x 13mm expandable)  5.  Decompression of thecal sac and nerve roots via laminectomy, bilateral foraminotomies, and medial facetectomy (left) L4/5, L5-S1   6. Danae osteotomy L3/4, L4/5, L5-S1   7.  Use of autologous bone graft  8.  Use of intraoperative fluoroscopy and CT   9. Use of intraoperative neuro-monitoring with stimulation  10. Use of allograft bone (10 cc Altapore)   11. Use of intraoperative neuronavigation      Indication:   Chris Mahmood is a 51 y.o. male with severe stenosis and dynamic instability with L4-L5/L5-S1 spondylolisthesis. Given these findings, I believe that he would benefit from lumbosacral fixation. He requires pelvic fixation given his sacral slope.      I have recommended L3 to pelvic fusion with L4/L5/L5-S1 TLIF.  Risks include, but are not limited to, bleeding, pain, infection, scarring, need for further/repeat procedure, death, paralysis, damage to  bowel/bladder/sexual function, blindness, stroke/damage to major blood vessels, leak of cerebrospinal fluid, arachnoiditis, adjacent segment disease, pseudoarthrosis, and hardware-related complications. Informed consent was obtained of the patient. I discussed that the case would be performed with my complex spine fellowship-trained partner, Dr. Gray Yadav.      Description of Technical Procedures:   The patient was brought to the operating room and general endotracheal anesthesia was induced and the appropriate lines placed. TEDs and SCDs were applied. A Little catheter was placed with assistance from urology, which is dictated separately by them. He was carefully positioned prone on the Airo table with all pressure points padded.  The lumbar region was marked, prepped and draped in the usual fashion. AP and lateral X-rays were used for localization.  A timeout was performed prior to the procedure. Lidocaine with epinephrine was  injected into the skin.     A linear midline incision was made from L3-pelvis using a #10 blade. Dissection was carried down with Bovie electrocautery.  Subperiosteal dissection was carried out with Bovie electrocautery and Richey elevators to expose the posterior elements from L3-pelvis. Our level was confirmed with the Lenke probe. An Airo intraoperative CT was performed.  L3 (7x45), L4 (6x45), L5 (6x45), S1 (6x45 L, 7x35 R) pedicle screws and bilateral iliac bolts (8x80) were placed bilaterally using Brainlab guidance. AP and lateral x-ray confirmed appropriate position of the hardware. The screws were stimulated with the neuro monitoring probe and found to stimulate above a threshold of 25 milliamperes.     Attention was turned to performing decompressive laminectomy and foraminotomies at L3/L4, L4/L5, and L5/S1 using a combination of the Leksell rongeur, Kerrison rongeurs, and curettes.  Danae osteotomies was performed as well.  Flavum was removed with Kerrison rongeurs. The thecal  sac was found to be severely compressed.      Attention was turned to performance of a transforaminal lumbar interbody fusion at L5-S1 and then L4/5 from a left-sided approach.  A nerve root retractor was used to retract the thecal sac medially and expose the L4/5 disc space.  Epidural veins were cauterized and divided.  An annulotomy was performed with a 15 blade.  Disc material was removed with the Kerrison rongeur.  The endplates were prepared with disc mariano, rasps, and curettes.  A size 10 trial at L5-S1 and then a size 13 trial at L4-L5 was placed into the disc space and found to fit snugly.  We then placed an expandable titanium cage and confirmed adequate placement with AP/lateral fluoro.  The cage was then back filled with a combination of autologous bone and synthetic bone graft.     Pre-bent titanium rods were then sized (130mm) and reduced into the tulip heads. Set screws were placed and final tightened. The wound was copiously irrigated with sterile normal saline and a dilute Betadine solution.  The posterior spinal elements and TPs were decorticated bilaterally with a high-speed drill.  Autologous and synthetic bone was placed posteriorly for arthrodesis from L4-L5.  Two deep Hemovacs were placed.  2 g of vancomycin powder was placed in the subfascial space.  A watertight fascial closure was achieved with interrupted 0 Vicryl sutures and a running Stratafix suture.  The soft tissues were closed in layers.  Staples were used to close the skin and a Prevana applied over.      All counts were correct x2. Antibiotic-containing irrigation was used throughout the case.     The patient remained hemodynamically stable throughout with no change in SSEPs or EMG. He was returned supine before being awakened by anesthesia and sent to the PACU in stable condition for recovery.     Two staff neurosurgeons were required given that the patient had a spino-pelvic mismatch and BMI >35, which also warrant a 22  modifier. Both Dr. Yadav and I meaningfully participated in hardware placement.

## 2022-04-06 NOTE — OP NOTE
[facility] - Surgery ([facility])  Neurosurgery  Operative Note    SUMMARY      Date of Procedure: 4/6/2022     Procedure: Procedure(s) (LRB):  FUSION, SPINE, LUMBAR, TLIF, POSTERIOR APPROACH, USING PEDICLE SCREW (N/A)  CYSTOSCOPY, FLEXIBLE with placement of miranda catheter  FUSION, SPINE, POSTERIOR SPINAL COLUMN, LUMBAR, USING COMPUTER-ASSISTED NAVIGATION  LAMINECTOMY, SPINE, LUMBAR     Surgeon(s) and Role:  Panel 1:     * Susanne Tiwari MD - Primary     * Tk España MD - Resident - Assisting     * Gray Yadav DO  Panel 2:     * Dominic Iverson MD - Primary     * Yossi Mayer MD - Resident - Assisting    Pre-Operative Diagnosis: Spondylolisthesis, lumbosacral region [M43.17]    Post-Operative Diagnosis: Post-Op Diagnosis Codes:     * Spondylolisthesis, lumbosacral region [M43.17]    Anesthesia: General    Procedures performed:   1. Posterior arthrodesis and fusion, L3-pelvis (DePuy Verse and Expedium Poly)   2. Transforaminal lumbar interbody fusion L5/S1 (Spine Wave Leva PX 25mm x 10mm x 10mm expandable)  3. Transforaminal lumbar interbody fusion, L4/5(Spine Wave Leva PX 25mm x 10mm x 13mm expandable)  5.  Decompression of thecal sac and nerve roots via laminectomy, bilateral foraminotomies, and medial facetectomy (left) L4/5, L5-S1   6. Danae osteotomy L3/4, L4/5, L5-S1   7.  Use of autologous bone graft  8.  Use of intraoperative fluoroscopy and CT   9. Use of intraoperative neuro-monitoring with stimulation  10. Use of allograft bone (10 cc Altapore)   11. Use of intraoperative neuronavigation      Indication:   Chris Mahmood is a 51 y.o. male with severe stenosis and dynamic instability with L4-L5/L5-S1 spondylolisthesis. Given these findings, I believe that he would benefit from lumbosacral fixation. He requires pelvic fixation given his sacral slope.      I have recommended L3 to pelvic fusion with L4/L5/L5-S1 TLIF.  Risks include, but are not limited to, bleeding, pain, infection, scarring,  need for further/repeat procedure, death, paralysis, damage to bowel/bladder/sexual function, blindness, stroke/damage to major blood vessels, leak of cerebrospinal fluid, arachnoiditis, adjacent segment disease, pseudoarthrosis, and hardware-related complications. Informed consent was obtained of the patient. I discussed that the case would be performed with my complex spine fellowship-trained partner, Dr. Gray Yadav.      Description of Technical Procedures:   The patient was brought to the operating room and general endotracheal anesthesia was induced and the appropriate lines placed. TEDs and SCDs were applied. A Little catheter was placed with assistance from urology, which is dictated separately by them. He was carefully positioned prone on the Airo table with all pressure points padded.  The lumbar region was marked, prepped and draped in the usual fashion. AP and lateral X-rays were used for localization.  A timeout was performed prior to the procedure. Lidocaine with epinephrine was  injected into the skin.     A linear midline incision was made from L3-pelvis using a #10 blade. Dissection was carried down with Bovie electrocautery.  Subperiosteal dissection was carried out with Bovie electrocautery and Richey elevators to expose the posterior elements from L3-pelvis. Our level was confirmed with the Lenke probe. An Airo intraoperative CT was performed.  L3 (7x45), L4 (6x45), L5 (6x45), S1 (6x45 L, 7x35 R) pedicle screws and bilateral iliac bolts (8x80) were placed bilaterally using Brainlab guidance. AP and lateral x-ray confirmed appropriate position of the hardware. The screws were stimulated with the neuro monitoring probe and found to stimulate above a threshold of 25 milliamperes.     Attention was turned to performing decompressive laminectomy and foraminotomies at L3/L4, L4/L5, and L5/S1 using a combination of the Leksell rongeur, Kerrison rongeurs, and curettes.  Danae osteotomies was performed  as well.  Flavum was removed with Kerrison rongeurs. The thecal sac was found to be severely compressed.      Attention was turned to performance of a transforaminal lumbar interbody fusion at L5-S1 and then L4/5 from a left-sided approach.  A nerve root retractor was used to retract the thecal sac medially and expose the L4/5 disc space.  Epidural veins were cauterized and divided.  An annulotomy was performed with a 15 blade.  Disc material was removed with the Kerrison rongeur.  The endplates were prepared with disc mariano, rasps, and curettes.  A size 10 trial at L5-S1 and then a size 13 trial at L4-L5 was placed into the disc space and found to fit snugly.  We then placed an expandable titanium cage and confirmed adequate placement with AP/lateral fluoro.  The cage was then back filled with a combination of autologous bone and synthetic bone graft.     Pre-bent titanium rods were then sized (130mm) and reduced into the tulip heads. Set screws were placed and final tightened. The wound was copiously irrigated with sterile normal saline and a dilute Betadine solution.  The posterior spinal elements and TPs were decorticated bilaterally with a high-speed drill.  Autologous and synthetic bone was placed posteriorly for arthrodesis from L3-S1.  Two deep Hemovacs were placed.  2 g of vancomycin powder was placed in the subfascial space.  A watertight fascial closure was achieved with interrupted 0 Vicryl sutures and a running Stratafix suture.  The soft tissues were closed in layers.  Staples were used to close the skin and a Prevana applied over.      All counts were correct x2. Antibiotic-containing irrigation was used throughout the case.     The patient remained hemodynamically stable throughout with no change in SSEPs or EMG. He was returned supine before being awakened by anesthesia and sent to the PACU in stable condition for recovery.     Two staff neurosurgeons were required given that the patient had a  spino-pelvic mismatch and BMI >35, which also warrant a 22 modifier. Both Dr. Yadav and I meaningfully participated in hardware placement.     Estimated Blood Loss (EBL): 1600 mL           Specimens:   Specimen (24h ago, onward)            None           Implants:   Implant Name Type Inv. Item Serial No.  Lot No. LRB No. Used Action   GRAFT [place] BIOACTIVE 10ML - [id number]  GRAFT ALTAPORE BIOACTIVE 10ML  [employer] [id number]  1 Implanted   CAGE LEVA 10MM [id number] - [id number]  CAGE LEVA 10MM [id number]  [employer] [id number]  1 Implanted   CAGE SPINAL EXPAND [id number] - [id number]  CAGE SPINAL EXPAND [id number]  [employer] [id number]  1 Implanted   SCREW EXPEDIUM VERSE PA 7X45MM - [id number]  SCREW EXPEDIUM VERSE PA 7X45MM  SYNTHES   2 Implanted   7x 35 screws    [employer]   1 Implanted   SCREW EXPEDIUM VERSE PA 8X80MM - [id number]  SCREW EXPEDIUM VERSE PA 8X80MM  SYNTHES   2 Implanted   SET SCREW EXPEDIUM VERSE UNITZ - [id number]  SET SCREW EXPEDIUM VERSE UNITZ  [employer]   5 Implanted   RENARD EXPEDIUM TI PRE-BENT 130MM - [id number]  RENARD EXPEDIUM TI PRE-BENT 130MM  SYNTHES   2 Implanted   SCREW INNER SINGLE SET TITANIU - [id number]  SCREW INNER SINGLE SET TITANIU  [employer]   6 Implanted   SCREW BONE SPINAL 5.5 6 X 45MM - [id number]  SCREW BONE SPINAL 5.5 6 X 45MM  [employer]   6 Implanted              Condition: Stable    Disposition: PACU - hemodynamically stable.    Attestation: I was present and scrubbed for the entire procedure.

## 2022-04-06 NOTE — H&P
"I have reviewed the following below and agree with findings. No changes have been made other than extending to L3, discused with patient. Plan to proceed with elective procedure. All questions have been answered.    Tk España MD  Neurosurgery    ________________________________________________________________________________        Neurosurgery  Follow up     SUBJECTIVE:         History of Present Illness:  "Chris Mahmood is a 50 y.o. male with PMH of GERD, current smoker, and HTN. He is being seen in clinic today to discuss his concerns with progressive low back and bilateral leg pain. States that he has always struggled with his back due to his profession as a ; however, in 2015 the pain became so severe that it started to affect his ability to perform his work duties. He was recently laid off from his job as a result of the COVID pandemic. Denies trauma.      "Describes the pain as constant, aching, and stabbing across his low back bilaterally with radiation in the posterior aspect of his thighs L > right. States that intermittently with ambulation he experiences sharp shooting pain in anterior and lateral aspects of his legs primarily from the knees down. Rates the back pain as a 10/10 and the left leg as a 8/10 and the right leg as a 6/10. Aggravating factors include walking, prolonged standing, and lying down. Alleviating factors include sitting and bending forward to stretch. He ambulates without the use of assistive devices. Denies b/b dysfunction, saddle anesthesia, or gait instability. Reports weakness in the legs L > R as a result of the pain; as well as numbness and tingling primarily in the L5/S1 dermatome on the left when the pain is severe. The patient attempted PT, but was unable to continue the sessions due to the pain. He has an appointment later this month with pain management to discuss ANA."      As of 6/24/21, the patient reports that he sees pain management tomorrow " "for discussion of ANA. He has severe, debilitating pain in his left leg 99% of the time. His symptoms are now sever even when sitting down. He does get some relief with bending over, as over a shopping cart.       He denies bleeding, infectious, or anesthetic complications from his previous elbow surgery. He smokes 2-3 cigars daily, and he drinks a little moonshine.      As of 2/17/22, the patient reports that he has been getting ANA and RFA, which have been helpful, but the pain is "coming back with a vengeance." He states that he is unable to keep living with the current level of pain he's experiencing.      Both legs hurt, but the left leg has worse shooting pain. This is different than when he got his previous MRI.      He is still smoking cigars. His gastric reflux has been acting up.      Review of patient's allergies indicates:  No Known Allergies     Current Medications          Current Outpatient Medications   Medication Sig Dispense Refill    folic acid (FOLVITE) 400 MCG tablet Take 400 mcg by mouth once daily.        gabapentin (NEURONTIN) 300 MG capsule Take 300 mg by mouth 3 (three) times daily.          No current facility-administered medications for this visit.                 Past Medical History:   Diagnosis Date    GERD (gastroesophageal reflux disease)      Hypertension              Past Surgical History:   Procedure Laterality Date    ELBOW SURGERY Left 1994     [facility] in [place]       Family History    None         Social History      Socioeconomic History    Marital status: Single   Tobacco Use    Smoking status: Current Every Day Smoker    Smokeless tobacco: Current User   Substance and Sexual Activity    Alcohol use: Yes       Comment: pt drinks moonshine daily    Drug use: No    Sexual activity: Yes       Partners: Female         Review of Systems  Constitutional: Negative for activity change, appetite change and fever.   HENT: Negative for hearing loss and postnasal " drip.    Eyes: Negative for pain and visual disturbance.   Respiratory: Negative for shortness of breath.    Cardiovascular: Negative for chest pain and palpitations.   Gastrointestinal: Negative for abdominal pain.   Endocrine: Negative for cold intolerance, polydipsia, polyphagia and polyuria.   Genitourinary: Negative for difficulty urinating, frequency and urgency.   Musculoskeletal: Positive for back pain and myalgias. Negative for gait problem.   Skin: Negative for color change and wound.   Neurological: Positive for weakness and numbness. Negative for dizziness and headaches.   Hematological: Does not bruise/bleed easily.   Psychiatric/Behavioral: Negative for confusion and dysphoric mood. The patient is nervous/anxious.       OBJECTIVE:      Vital Signs  There is no height or weight on file to calculate BMI.        Neurosurgery Physical Exam  General: well developed, well nourished, no distress.   Head: normocephalic, atraumatic  Neurologic: Alert and oriented. Thought content appropriate.  GCS: Motor: 6/Verbal: 5/Eyes: 4 GCS Total: 15  Mental Status: Awake, Alert, Oriented x 4  Language: No aphasia  Speech: No dysarthria  Cranial nerves: face symmetric, tongue midline, CN II-XII grossly intact.   Eyes: pupils equal, round, reactive to light with accomodation, EOMI.   Pulmonary: normal respirations, no signs of respiratory distress  Abdomen: soft, non-distended  Skin: Skin is warm, dry and intact.  Sensory: intact to light touch throughout  Motor Strength:Moves all extremities spontaneously with good tone. No abnormal movements seen.      Strength Exam Pain Limited  Strength   Deltoids Triceps Biceps Wrist Extension Wrist Flexion Hand    Upper: R 5/5 5/5 5/5 5/5 5/5 5/5     L 5/5 5/5 5/5 5/5 5/5 5/5       Iliopsoas Quadriceps Knee  Flexion Tibialis  anterior Gastro- cnemius EHL   Lower: R 5/5 5/5 5/5 5/5 5/5 5/5     L 4/5 4/5 5/5 5/5 5/5 4/5      Reflexes:   DTR: 2+ symmetrically throughout.  Ware's:  Negative.  Babinski's: Negative.  Clonus: Negative.     Cerebellar:   Finger-to-nose: intact bilaterally   Pronator drift: absent bilaterally  Gait stable, antalgic  Tandem Gait: unable; loss of balance     Cervical:   ROM: Full with flexion, extension, lateral rotation and ear-to-shoulder bend.   Midline TTP: Negative.  Lhermitte's: Negative.     Thoracic:  Midline TTP: Negative.     Lumbar:  Midline TTP: Negative.  Straight Leg Test: Positive Left     Diagnostic Results:  MRI and flex/ex personally reviewed   There is lipomatosis resulting in L4-L5/L5-S1 stenosis   Dynamic instability of L4 on L5 and L5-S1, grade I   No pars defect noted   High sacral slope noted on scoliosis X-rays      ASSESSMENT/PLAN:      Chris Mahmood is a 51 y.o. male with severe stenosis and dynamic instability and L4-L5/L5-S1 spondylolisthesis. Given these findings, I believe that he would benefit from lumbosacral fixation. He should have pelvic fixation given his sacral slope.      I have recommended L4/L5/L5-S1 TLIF with pelvic fixation. I would like to obtain repeat MRI of the lumbar spine prior to proceeding given the patient's significant worsening of symptoms since previous neural element imaging was obtained. If there is a significant change in findings, this would change our operative plan.      Risks include, but are not limited to, bleeding, pain, infection, scarring, need for further/repeat procedure, death, paralysis, damage to bowel/bladder/sexual function, blindness, stroke/damage to major blood vessels, leak of cerebrospinal fluid, arachnoiditis, adjacent segment disease, pseudoarthrosis, and hardware-related complications. Informed consent was obtained of the patient. I discussed that the case would be performed with my complex spine fellowship-trained partner, Dr. Gray Yadav.      He will need to be off all anticoagulation/anitplatelet agents for one week prior to surgery. A decolonization protocol was given.       I have encouraged him to contact the clinic in interim with any questions, concerns, or adverse clinical change.

## 2022-04-06 NOTE — ANESTHESIA POSTPROCEDURE EVALUATION
Anesthesia Post Evaluation    Patient: Chris Mahmood    Procedure(s) Performed: Procedure(s) (LRB):  FUSION, SPINE, LUMBAR, TLIF, POSTERIOR APPROACH, USING PEDICLE SCREW (N/A)  CYSTOSCOPY, FLEXIBLE with placement of miranda catheter  FUSION, SPINE, POSTERIOR SPINAL COLUMN, LUMBAR, USING COMPUTER-ASSISTED NAVIGATION  LAMINECTOMY, SPINE, LUMBAR    Final Anesthesia Type: general      Patient location during evaluation: PACU  Patient participation: Yes- Able to Participate  Level of consciousness: awake and alert and oriented  Post-procedure vital signs: reviewed and stable  Pain management: adequate  Airway patency: patent    PONV status at discharge: No PONV  Anesthetic complications: no      Cardiovascular status: hemodynamically stable  Respiratory status: face mask and spontaneous ventilation  Hydration status: euvolemic  Follow-up not needed.          Vitals Value Taken Time   /55 04/06/22 1733   Temp 36.2 °C (97.2 °F) 04/06/22 1700   Pulse 94 04/06/22 1741   Resp 21 04/06/22 1741   SpO2 94 % 04/06/22 1741   Vitals shown include unvalidated device data.      No case tracking events are documented in the log.      Pain/Yumiko Score: Pain Rating Prior to Med Admin: 10 (4/6/2022  5:37 PM)  Yumiko Score: 8 (4/6/2022  5:15 PM)

## 2022-04-06 NOTE — ANESTHESIA PROCEDURE NOTES
Intubation    Date/Time: 4/6/2022 8:24 AM  Performed by: Arsh Fernando MD  Authorized by: Dustin Chamberlain MD     Intubation:     Induction:  Intravenous    Intubated:  Postinduction    Mask Ventilation:  Moderately difficult with oral airway (2 hand mask)    Attempts:  1    Attempted By:  Resident anesthesiologist    Method of Intubation:  Video laryngoscopy    Blade:  Conley 4    Laryngeal View Grade: Grade I - full view of cords      Difficult Airway Encountered?: No      Airway Device:  Oral endotracheal tube    Airway Device Size:  8.0    Style/Cuff Inflation:  Cuffed    Inflation Amount (mL):  25    Secured at:  The lips    Placement Verified By:  Capnometry    Complicating Factors:  Oropharyngeal edema or fat    Findings Post-Intubation:  BS equal bilateral

## 2022-04-06 NOTE — ANESTHESIA PROCEDURE NOTES
Arterial    Diagnosis: Spondylolisthesis of lumbosacral region    Patient location during procedure: done in OR    Staffing  Authorizing Provider: Dustin Chamberlain MD  Performing Provider: Arsh Fernando MD    Anesthesiologist was present at the time of the procedure.    Preanesthetic Checklist  Completed: patient identified, IV checked, site marked, risks and benefits discussed, surgical consent, monitors and equipment checked, pre-op evaluation, timeout performed and anesthesia consent givenArterial  Skin Prep: chlorhexidine gluconate  Orientation: right  Location: radial    Catheter Size: 20 G  Catheter placement by Anatomical landmarks. Heme positive aspiration all ports. Insertion Attempts: 3  Assessment  Dressing: secured with tape and tegaderm  Patient: Tolerated well

## 2022-04-06 NOTE — BRIEF OP NOTE
[facility] - Surgery ([facility])  Brief Operative Note    SUMMARY     Surgery Date: 4/6/2022     Surgeon(s) and Role:  Panel 1:     * Susanne Tiwari MD - Primary     * Tk España MD - Resident - Assisting     * Gray Yadav DO  Panel 2:     * Dominic Iverson MD - Primary     * Yossi Mayer MD - Resident - Assisting        Pre-op Diagnosis:  Spondylolisthesis, lumbosacral region [M43.17]    Post-op Diagnosis:  Post-Op Diagnosis Codes:     * Spondylolisthesis, lumbosacral region [M43.17]    Procedure(s) (LRB):  FUSION, SPINE, LUMBAR, TLIF, POSTERIOR APPROACH, USING PEDICLE SCREW (N/A)  CYSTOSCOPY, FLEXIBLE with placement of miranda catheter  FUSION, SPINE, POSTERIOR SPINAL COLUMN, LUMBAR, USING COMPUTER-ASSISTED NAVIGATION  LAMINECTOMY, SPINE, LUMBAR    Anesthesia: General    Operative Findings: AIRO assisted L3-L5 laminectomy, L4-5 L5-S1 TLIF, L3-Iliac posterior instrumented fixation with posterolateral fusion.  See full operative note  For further details    Estimated Blood Loss: 1600 mL    Estimated Blood Loss has been documented.         Specimens:   Specimen (24h ago, onward)            None          [id number]

## 2022-04-07 LAB
ANION GAP SERPL CALC-SCNC: 6 MMOL/L (ref 8–16)
BASOPHILS # BLD AUTO: 0.03 K/UL (ref 0–0.2)
BASOPHILS NFR BLD: 0.2 % (ref 0–1.9)
BUN SERPL-MCNC: 10 MG/DL (ref 6–20)
CALCIUM SERPL-MCNC: 8 MG/DL (ref 8.7–10.5)
CHLORIDE SERPL-SCNC: 107 MMOL/L (ref 95–110)
CO2 SERPL-SCNC: 26 MMOL/L (ref 23–29)
CREAT SERPL-MCNC: 1.1 MG/DL (ref 0.5–1.4)
DIFFERENTIAL METHOD: ABNORMAL
EOSINOPHIL # BLD AUTO: 0 K/UL (ref 0–0.5)
EOSINOPHIL NFR BLD: 0.1 % (ref 0–8)
ERYTHROCYTE [DISTWIDTH] IN BLOOD BY AUTOMATED COUNT: 14.5 % (ref 11.5–14.5)
EST. GFR  (AFRICAN AMERICAN): >60 ML/MIN/1.73 M^2
EST. GFR  (NON AFRICAN AMERICAN): >60 ML/MIN/1.73 M^2
GLUCOSE SERPL-MCNC: 107 MG/DL (ref 70–110)
HCT VFR BLD AUTO: 33.4 % (ref 40–54)
HGB BLD-MCNC: 11 G/DL (ref 14–18)
IMM GRANULOCYTES # BLD AUTO: 0.04 K/UL (ref 0–0.04)
IMM GRANULOCYTES NFR BLD AUTO: 0.3 % (ref 0–0.5)
LYMPHOCYTES # BLD AUTO: 2.2 K/UL (ref 1–4.8)
LYMPHOCYTES NFR BLD: 16.7 % (ref 18–48)
MCH RBC QN AUTO: 32 PG (ref 27–31)
MCHC RBC AUTO-ENTMCNC: 32.9 G/DL (ref 32–36)
MCV RBC AUTO: 97 FL (ref 82–98)
MONOCYTES # BLD AUTO: 1.6 K/UL (ref 0.3–1)
MONOCYTES NFR BLD: 12.4 % (ref 4–15)
NEUTROPHILS # BLD AUTO: 9.1 K/UL (ref 1.8–7.7)
NEUTROPHILS NFR BLD: 70.3 % (ref 38–73)
NRBC BLD-RTO: 0 /100 WBC
PLATELET # BLD AUTO: 132 K/UL (ref 150–450)
PMV BLD AUTO: 10.8 FL (ref 9.2–12.9)
POTASSIUM SERPL-SCNC: 4 MMOL/L (ref 3.5–5.1)
RBC # BLD AUTO: 3.44 M/UL (ref 4.6–6.2)
SODIUM SERPL-SCNC: 139 MMOL/L (ref 136–145)
WBC # BLD AUTO: 12.95 K/UL (ref 3.9–12.7)

## 2022-04-07 PROCEDURE — 25000003 PHARM REV CODE 250: Performed by: PHYSICIAN ASSISTANT

## 2022-04-07 PROCEDURE — 85025 COMPLETE CBC W/AUTO DIFF WBC: CPT | Performed by: STUDENT IN AN ORGANIZED HEALTH CARE EDUCATION/TRAINING PROGRAM

## 2022-04-07 PROCEDURE — 25000003 PHARM REV CODE 250: Performed by: STUDENT IN AN ORGANIZED HEALTH CARE EDUCATION/TRAINING PROGRAM

## 2022-04-07 PROCEDURE — 97535 SELF CARE MNGMENT TRAINING: CPT

## 2022-04-07 PROCEDURE — 99024 POSTOP FOLLOW-UP VISIT: CPT | Mod: ,,, | Performed by: PHYSICIAN ASSISTANT

## 2022-04-07 PROCEDURE — 11000001 HC ACUTE MED/SURG PRIVATE ROOM

## 2022-04-07 PROCEDURE — 97166 OT EVAL MOD COMPLEX 45 MIN: CPT

## 2022-04-07 PROCEDURE — 94761 N-INVAS EAR/PLS OXIMETRY MLT: CPT

## 2022-04-07 PROCEDURE — 97162 PT EVAL MOD COMPLEX 30 MIN: CPT

## 2022-04-07 PROCEDURE — 99024 PR POST-OP FOLLOW-UP VISIT: ICD-10-PCS | Mod: ,,, | Performed by: PHYSICIAN ASSISTANT

## 2022-04-07 PROCEDURE — 63600175 PHARM REV CODE 636 W HCPCS: Performed by: STUDENT IN AN ORGANIZED HEALTH CARE EDUCATION/TRAINING PROGRAM

## 2022-04-07 PROCEDURE — 80048 BASIC METABOLIC PNL TOTAL CA: CPT | Performed by: STUDENT IN AN ORGANIZED HEALTH CARE EDUCATION/TRAINING PROGRAM

## 2022-04-07 PROCEDURE — 63600175 PHARM REV CODE 636 W HCPCS: Performed by: PHYSICIAN ASSISTANT

## 2022-04-07 PROCEDURE — 97116 GAIT TRAINING THERAPY: CPT

## 2022-04-07 RX ORDER — OXYCODONE HYDROCHLORIDE 10 MG/1
10 TABLET ORAL EVERY 4 HOURS PRN
Status: DISCONTINUED | OUTPATIENT
Start: 2022-04-07 | End: 2022-04-11 | Stop reason: HOSPADM

## 2022-04-07 RX ORDER — ACETAMINOPHEN 500 MG
1000 TABLET ORAL EVERY 8 HOURS
Status: DISCONTINUED | OUTPATIENT
Start: 2022-04-07 | End: 2022-04-11 | Stop reason: HOSPADM

## 2022-04-07 RX ORDER — AMOXICILLIN 250 MG
1 CAPSULE ORAL DAILY
Status: DISCONTINUED | OUTPATIENT
Start: 2022-04-07 | End: 2022-04-11 | Stop reason: HOSPADM

## 2022-04-07 RX ORDER — HEPARIN SODIUM 5000 [USP'U]/ML
5000 INJECTION, SOLUTION INTRAVENOUS; SUBCUTANEOUS EVERY 8 HOURS
Status: DISCONTINUED | OUTPATIENT
Start: 2022-04-07 | End: 2022-04-11 | Stop reason: HOSPADM

## 2022-04-07 RX ORDER — POLYETHYLENE GLYCOL 3350 17 G/17G
17 POWDER, FOR SOLUTION ORAL DAILY
Status: DISCONTINUED | OUTPATIENT
Start: 2022-04-07 | End: 2022-04-11 | Stop reason: HOSPADM

## 2022-04-07 RX ORDER — OXYCODONE HYDROCHLORIDE 5 MG/1
5 TABLET ORAL EVERY 4 HOURS PRN
Status: DISCONTINUED | OUTPATIENT
Start: 2022-04-07 | End: 2022-04-11 | Stop reason: HOSPADM

## 2022-04-07 RX ADMIN — DIAZEPAM 5 MG: 5 TABLET ORAL at 09:04

## 2022-04-07 RX ADMIN — OXYCODONE HYDROCHLORIDE 10 MG: 10 TABLET ORAL at 04:04

## 2022-04-07 RX ADMIN — DIAZEPAM 5 MG: 5 TABLET ORAL at 02:04

## 2022-04-07 RX ADMIN — DIAZEPAM 5 MG: 5 TABLET ORAL at 03:04

## 2022-04-07 RX ADMIN — GABAPENTIN 300 MG: 300 CAPSULE ORAL at 08:04

## 2022-04-07 RX ADMIN — HEPARIN SODIUM 5000 UNITS: 5000 INJECTION INTRAVENOUS; SUBCUTANEOUS at 02:04

## 2022-04-07 RX ADMIN — MORPHINE SULFATE 2 MG: 2 INJECTION, SOLUTION INTRAMUSCULAR; INTRAVENOUS at 03:04

## 2022-04-07 RX ADMIN — HEPARIN SODIUM 5000 UNITS: 5000 INJECTION INTRAVENOUS; SUBCUTANEOUS at 09:04

## 2022-04-07 RX ADMIN — TIZANIDINE 4 MG: 4 TABLET ORAL at 08:04

## 2022-04-07 RX ADMIN — MUPIROCIN: 20 OINTMENT TOPICAL at 08:04

## 2022-04-07 RX ADMIN — GABAPENTIN 300 MG: 300 CAPSULE ORAL at 09:04

## 2022-04-07 RX ADMIN — MORPHINE SULFATE 2 MG: 2 INJECTION, SOLUTION INTRAMUSCULAR; INTRAVENOUS at 09:04

## 2022-04-07 RX ADMIN — Medication 2 G: at 09:04

## 2022-04-07 RX ADMIN — BISACODYL 10 MG: 10 SUPPOSITORY RECTAL at 08:04

## 2022-04-07 RX ADMIN — GABAPENTIN 300 MG: 300 CAPSULE ORAL at 02:04

## 2022-04-07 RX ADMIN — AMLODIPINE BESYLATE 10 MG: 10 TABLET ORAL at 08:04

## 2022-04-07 RX ADMIN — OXYCODONE HYDROCHLORIDE 10 MG: 10 TABLET ORAL at 09:04

## 2022-04-07 RX ADMIN — ACETAMINOPHEN 1000 MG: 500 TABLET ORAL at 02:04

## 2022-04-07 RX ADMIN — MORPHINE SULFATE 2 MG: 2 INJECTION, SOLUTION INTRAMUSCULAR; INTRAVENOUS at 06:04

## 2022-04-07 RX ADMIN — OXYCODONE HYDROCHLORIDE AND ACETAMINOPHEN 1 TABLET: 10; 325 TABLET ORAL at 02:04

## 2022-04-07 RX ADMIN — ACETAMINOPHEN 1000 MG: 500 TABLET ORAL at 09:04

## 2022-04-07 RX ADMIN — SENNOSIDES AND DOCUSATE SODIUM 1 TABLET: 50; 8.6 TABLET ORAL at 09:04

## 2022-04-07 RX ADMIN — OXYCODONE HYDROCHLORIDE 10 MG: 10 TABLET ORAL at 11:04

## 2022-04-07 RX ADMIN — Medication 2 G: at 05:04

## 2022-04-07 RX ADMIN — MUPIROCIN: 20 OINTMENT TOPICAL at 09:04

## 2022-04-07 RX ADMIN — OXYCODONE HYDROCHLORIDE AND ACETAMINOPHEN 1 TABLET: 10; 325 TABLET ORAL at 08:04

## 2022-04-07 RX ADMIN — POLYETHYLENE GLYCOL 3350 17 G: 17 POWDER, FOR SOLUTION ORAL at 09:04

## 2022-04-07 RX ADMIN — Medication 2 G: at 02:04

## 2022-04-07 NOTE — PLAN OF CARE
Problem: Adult Inpatient Plan of Care  Goal: Plan of Care Review  Outcome: Ongoing, Progressing     Patient is AAO x4. POC reviewed with patient. Patient verbalized understanding. Patient's breathing is unlabored with equal chest expansion. Patient has an incision to back with 2 accordion/hemovac drains in place with a wound vac. Patient complains of back pain;PRN pain meds given per order. Pain meds did offer relief for patient, but turning agitates patient's pain. Patient states he has been sleeping on a water bed for the last 7 years due to back pain. Patient voids per miranda that was placed by urology. Miranda is to remain in place for 3 days. Miranda was placed 4/6. Patient remained free from falls. Patient was able to get some rest during shift. Bed in lowest position,bed alarm on, side rails up x3, no complaints or signs of distress. WCTM.  See flowsheets for full assessment and VS info.

## 2022-04-07 NOTE — PROGRESS NOTES
"[facility] - Neurosurgery ([facility])  Neurosurgery  Progress Note    Subjective:     History of Present Illness: "Chris Mahmood is a 50 y.o. male with PMH of GERD, current smoker, and HTN. He is being seen in clinic today to discuss his concerns with progressive low back and bilateral leg pain. States that he has always struggled with his back due to his profession as a ; however, in 2015 the pain became so severe that it started to affect his ability to perform his work duties. He was recently laid off from his job as a result of the COVID pandemic. Denies trauma.      "Describes the pain as constant, aching, and stabbing across his low back bilaterally with radiation in the posterior aspect of his thighs L > right. States that intermittently with ambulation he experiences sharp shooting pain in anterior and lateral aspects of his legs primarily from the knees down. Rates the back pain as a 10/10 and the left leg as a 8/10 and the right leg as a 6/10. Aggravating factors include walking, prolonged standing, and lying down. Alleviating factors include sitting and bending forward to stretch. He ambulates without the use of assistive devices. Denies b/b dysfunction, saddle anesthesia, or gait instability. Reports weakness in the legs L > R as a result of the pain; as well as numbness and tingling primarily in the L5/S1 dermatome on the left when the pain is severe. The patient attempted PT, but was unable to continue the sessions due to the pain. He has an appointment later this month with pain management to discuss ANA."      As of 6/24/21, the patient reports that he sees pain management tomorrow for discussion of ANA. He has severe, debilitating pain in his left leg 99% of the time. His symptoms are now sever even when sitting down. He does get some relief with bending over, as over a shopping cart.       He denies bleeding, infectious, or anesthetic complications from his previous elbow " "surgery. He smokes 2-3 cigars daily, and he drinks a little moonshine.      As of 2/17/22, the patient reports that he has been getting ANA and RFA, which have been helpful, but the pain is "coming back with a vengeance." He states that he is unable to keep living with the current level of pain he's experiencing.      Both legs hurt, but the left leg has worse shooting pain. This is different than when he got his previous MRI.      He is still smoking cigars. His gastric reflux has been acting up.       Post-Op Info:  Procedure(s) (LRB):  FUSION, SPINE, LUMBAR, TLIF, POSTERIOR APPROACH, USING PEDICLE SCREW (N/A)  CYSTOSCOPY, FLEXIBLE with placement of miranda catheter  FUSION, SPINE, POSTERIOR SPINAL COLUMN, LUMBAR, USING COMPUTER-ASSISTED NAVIGATION  LAMINECTOMY, SPINE, LUMBAR   1 Day Post-Op     Interval History:  NAEON. Reports lower back pain not well controlled on current regimen. Neuro stable. Miranda in place per Urology. Drains with high output, keep in place. PT/OT today and post op XR pending.     Medications:  Continuous Infusions:  Scheduled Meds:   amLODIPine  10 mg Oral Daily    ceFAZolin (ANCEF) IVPB  2 g Intravenous Q8H    gabapentin  300 mg Oral TID    heparin (porcine)  5,000 Units Subcutaneous Q8H    mupirocin   Nasal BID    polyethylene glycol  17 g Oral Daily    senna-docusate 8.6-50 mg  1 tablet Oral Daily    tiZANidine  4 mg Oral Daily     PRN Meds:acetaminophen, aluminum-magnesium hydroxide-simethicone, diazePAM, HYDROcodone-acetaminophen, HYDROcodone-acetaminophen, melatonin, morphine, ondansetron, oxyCODONE-acetaminophen, prochlorperazine, senna-docusate 8.6-50 mg, sodium chloride 0.9%     Review of Systems  Objective:     Weight: 113.4 kg (250 lb)  Body mass index is 35.87 kg/m².  Vital Signs (Most Recent):  Temp: 97.3 °F (36.3 °C) (04/07/22 0731)  Pulse: 87 (04/07/22 0731)  Resp: 18 (04/07/22 0804)  BP: 134/83 (04/07/22 0731)  SpO2: (!) 94 % (04/07/22 0731)   Vital Signs (24h " "Range):  Temp:  [97.2 °F (36.2 °C)-98.7 °F (37.1 °C)] 97.3 °F (36.3 °C)  Pulse:  [] 87  Resp:  [11-35] 18  SpO2:  [90 %-100 %] 94 %  BP: (107-172)/() 134/83                          Closed/Suction Drain 04/06/22 1549 Left Back Accordion 10 Fr. (Active)   Dressing Type Transparent (Tegaderm) 04/07/22 0400   Dressing Status Clean;Dry;Intact 04/07/22 0400   Dressing Intervention Integrity maintained 04/07/22 0400   Drainage Serous 04/07/22 0400   Status Other (Comment) 04/06/22 1950   Output (mL) 120 mL 04/07/22 0600            Closed/Suction Drain 04/06/22 1551 Right Back Accordion 10 Fr. (Active)   Dressing Type Transparent (Tegaderm) 04/07/22 0400   Dressing Status Clean;Dry;Intact 04/07/22 0400   Dressing Intervention Integrity maintained 04/07/22 0400   Drainage Serous 04/07/22 0400   Output (mL) 160 mL 04/07/22 0600            Urethral Catheter 04/06/22 0915 Latex 16 Fr. (Active)   Site Assessment Clean;Intact 04/07/22 0400   Collection Container Urimeter 04/07/22 0400   Securement Method secured to top of thigh w/ adhesive device 04/07/22 0400   Catheter Care Performed yes 04/07/22 0400   Reason for Continuing Urinary Catheterization Post operative 04/07/22 0400   CAUTI Prevention Bundle Securement Device in place with 1" slack;Intact seal between catheter & drainage tubing;Drainage bag/urimeter off the floor;Sheeting clip in use;Drainage bag/urimeter not overfilled (<2/3 full);No dependent loops or kinks;Drainage bag/urimeter below bladder 04/06/22 2036   Output (mL) 400 mL 04/06/22 2345       Physical Exam    Neurosurgery Physical Exam  General: well developed, well nourished, no distress.   Head: normocephalic, atraumatic  Neurologic: Alert and oriented. Thought content appropriate.  GCS: Motor: 6/Verbal: 5/Eyes: 4 GCS Total: 15  Mental Status: Awake, Alert, Oriented x 4  Language: No aphasia  Speech: No dysarthria  Cranial nerves: face symmetric, tongue midline, CN II-XII grossly intact.   Eyes: " pupils equal, round, reactive to light with accommodation, EOMI.   Pulmonary: normal respirations, no signs of respiratory distress  Abdomen: soft, non-distended, not tender to palpation  Skin: Skin is warm, dry and intact.  Sensory: intact to light touch throughout    Motor Strength:Moves all extremities spontaneously with good tone.   No abnormal movements seen.     Strength  Deltoids Triceps Biceps Wrist Extension Wrist Flexion Hand    Upper: R 5/5 5/5 5/5 5/5 5/5 5/5    L 5/5 5/5 5/5 5/5 5/5 5/5     Iliopsoas Quadriceps Knee  Flexion Tibialis  anterior Gastro- cnemius EHL   Lower: R 4/5 5/5 5/5 5/5 5/5 5/5    L 4/5 5/5 5/5 5/5 5/5 4/5   Pain limited weakness in proximal lower extremities     Incision: Wound vac in place with good seal. 2 HV drains in place to full suction.     Significant Labs:  Recent Labs   Lab 04/06/22  0648 04/06/22  1726 04/07/22  0745    154* 107    141 139   K 4.0 4.4 4.0    113* 107   CO2 24 22* 26   BUN 10 7 10   CREATININE 0.9 0.8 1.1   CALCIUM 8.8 7.4* 8.0*     Recent Labs   Lab 04/06/22  0648 04/06/22  1109 04/06/22  1445 04/06/22  1726 04/07/22  0745   WBC 7.62  --   --  17.03* 12.95*   HGB 14.1  --   --  12.1* 11.0*   HCT 42.6   < > 38 36.3* 33.4*     --   --  147* 132*    < > = values in this interval not displayed.     Recent Labs   Lab 04/06/22  0648   INR 1.0   APTT 29.6     Microbiology Results (last 7 days)       ** No results found for the last 168 hours. **          All pertinent labs from the last 24 hours have been reviewed.    Significant Diagnostics:  No results found in the last 24 hours.      Assessment/Plan:     * Spondylolisthesis of lumbosacral region  Chris Mahmood is a 51 y.o. male with severe stenosis and dynamic instability with L4-L5/L5-S1 spondylolisthesis. Now s/p L4-5, L5-S1 TLIF and L3-iliac fusion on 4/6/22    - Neuro stable  - HV drains in place x 2. Left with 400 cc, Right with 520 cc. Keep in place. Abx while in  place  - [place] x 5 days  - Post op H/H stable.   - Post op XR pending, to be performed standing  - Brace to be worn when up and OOB  - Pain control: not well controlled currently. Added scheduled tylenol 1 g tid. Oxycodone 5 mg q 4 hours prn moderate pain. Oxycodone 10 mg q 4 hours prn severe pain. Morphine IV for breakthrough pain.   - Little in place per Urology. Keep in place for 3-5 days, will keep for full 5 days if still inpatient.   - PT/OT/OOB  - DVT prophylaxis: TEDs/SCDs/SQH  - Bowel regimen: senna and miralax daily     DISPO: ongoing, pending PT/OT recs and drain removal         Debbie Franklin PA-C  Neurosurgery  [facility] - Neurosurgery ([facility])

## 2022-04-07 NOTE — PLAN OF CARE
Problem: Physical Therapy  Goal: Physical Therapy Goal  Description: Goals to be met by: 22    Patient will increase functional independence with mobility by performin. Pt will perform supine<>sit with modified independence to improve independence with mobility  2. Pt will perform functional transfers with independence   3. Pt will ambulate >150 ft feet with LRAD and modified independence to safely perform household distance ambulation   4. Pt will recall 3/3 spinal precautions independently   Outcome: Ongoing, Progressing     Evaluation completed and goals currently appropriate at this time.    Nadja Costello, PT, DPT, GCS  2022

## 2022-04-07 NOTE — HPI
""Chris Mahmood is a 50 y.o. male with PMH of GERD, current smoker, and HTN. He is being seen in clinic today to discuss his concerns with progressive low back and bilateral leg pain. States that he has always struggled with his back due to his profession as a ; however, in 2015 the pain became so severe that it started to affect his ability to perform his work duties. He was recently laid off from his job as a result of the COVID pandemic. Denies trauma.      "Describes the pain as constant, aching, and stabbing across his low back bilaterally with radiation in the posterior aspect of his thighs L > right. States that intermittently with ambulation he experiences sharp shooting pain in anterior and lateral aspects of his legs primarily from the knees down. Rates the back pain as a 10/10 and the left leg as a 8/10 and the right leg as a 6/10. Aggravating factors include walking, prolonged standing, and lying down. Alleviating factors include sitting and bending forward to stretch. He ambulates without the use of assistive devices. Denies b/b dysfunction, saddle anesthesia, or gait instability. Reports weakness in the legs L > R as a result of the pain; as well as numbness and tingling primarily in the L5/S1 dermatome on the left when the pain is severe. The patient attempted PT, but was unable to continue the sessions due to the pain. He has an appointment later this month with pain management to discuss ANA."      As of 6/24/21, the patient reports that he sees pain management tomorrow for discussion of ANA. He has severe, debilitating pain in his left leg 99% of the time. His symptoms are now sever even when sitting down. He does get some relief with bending over, as over a shopping cart.       He denies bleeding, infectious, or anesthetic complications from his previous elbow surgery. He smokes 2-3 cigars daily, and he drinks a little moonshine.      As of 2/17/22, the patient reports that he " "has been getting ANA and RFA, which have been helpful, but the pain is "coming back with a vengeance." He states that he is unable to keep living with the current level of pain he's experiencing.      Both legs hurt, but the left leg has worse shooting pain. This is different than when he got his previous MRI.      He is still smoking cigars. His gastric reflux has been acting up.   "

## 2022-04-07 NOTE — PT/OT/SLP EVAL
Occupational Therapy   Evaluation    Name: Chris Mahmood  MRN: [id number]  Admitting Diagnosis:  Spondylolisthesis of lumbosacral region  Recent Surgery: Procedure(s) (LRB):  FUSION, SPINE, LUMBAR, TLIF, POSTERIOR APPROACH, USING PEDICLE SCREW (N/A)  CYSTOSCOPY, FLEXIBLE with placement of miranda catheter  FUSION, SPINE, POSTERIOR SPINAL COLUMN, LUMBAR, USING COMPUTER-ASSISTED NAVIGATION  LAMINECTOMY, SPINE, LUMBAR 1 Day Post-Op    Recommendations:     Discharge Recommendations: outpatient OT  Discharge Equipment Recommendations:  walker, rolling (tub transfer bench, sock aide and long handled shoe horn)  Barriers to discharge:  None    Assessment:     Chris Mahmood is a 51 y.o. male with a medical diagnosis of Spondylolisthesis of lumbosacral region.  He presents with  weakness, impaired endurance, impaired self care skills, impaired functional mobilty, gait instability, pain, orthopedic precautions.      Rehab Prognosis: Good; patient would benefit from acute skilled OT services to address these deficits and reach maximum level of function.       Plan:     Patient to be seen 4 x/week to address the above listed problems via self-care/home management, therapeutic exercises, therapeutic activities  · Plan of Care Expires: 05/07/22  · Plan of Care Reviewed with: patient, significant other    Subjective     Chief Complaint: back pain  Patient/Family Comments/goals: to be pain free and return to PLOF as well as working    Occupational Profile:  Living Environment: [facility] with fiance, no LUIS. Tub/shower combo.   Previous level of function: Independent. Was using SPC intermittently a few days before procedure.   Roles and Routines: Was a , laid off November 2020 2/2 pandemic,  Equipment Used at Home:  cane, straight  Assistance upon Discharge: fiance, family is able to take patient to outpatient appointments     Pain/Comfort:  Pain Rating 1: 10/10  Location 1: back  Pain Addressed 1: Reposition,  "Distraction    Patients cultural, spiritual, Baptism conflicts given the current situation: no    Objective:     Communicated with: RN prior to session.  Patient found right sidelying with hemovac, wound vac, miranda catheter upon OT entry to room.    General Precautions: Standard, fall   Orthopedic Precautions:spinal precautions   Braces: TLSO  Respiratory Status: Room air    Occupational Performance:    Bed Mobility:    · Supine > sitting: min A x2. Pt educated on log rolling to maintain spinal precautions. Pt verbalized and demonstrated understanding.   · Pt educated on spinal precautions at beginning and ending of session. Understanding improved but requires further review and reinforcement. Fiance verbalized understanding.     Functional Mobility/Transfers:  · Sit <> stand: CGA RW  · Functional Mobility: Pt stood and completed functional ambulation to door and back to room to sit in chair, CGA with RW. Pt stated "Oh yes I can do this" during functional ambulation as well as "I'm going to heal through pain"    Activities of Daily Living:  · LBD: max A to don socks in supine  · Pt educated on hip kit for dressing adaptive equipment to increase independence with ADLs. Pt stated he has a reacher. Pt would benefit from a sock aide and long handled shoe horn. Pt verbalized understanding of adaptive dressing equipment, would benefit from training.     Cognitive/Visual Perceptual:  Cognitive/Psychosocial Skills:     -       Oriented to: not formally assessed, no deficits noted   -       Follows Commands/attention:Follows multistep  commands  -       Communication: clear/fluent  -       Memory: No Deficits noted  -       Safety awareness/insight to disability: intact   -       Mood/Affect/Coping skills/emotional control: Happy  Visual/Perceptual:      -Intact .    Physical Exam:  BUE ROM/MMT WFL. MMT NT 2/2 spinal precautions. Pt subjectively reported that his BUEs feel WFL regarding strength.     [facility] 6 Click " ADL:  AMPAC Total Score: 18    Treatment & Education:  OT role, plan of care, ADL retraining, functional mobilty/transfer retraining, fall prevention, spinal precautions, discharge planning and recommendation    Education:    Patient left up in chair with all lines intact, call button in reach and RN notified and fiance in room.    GOALS:   Multidisciplinary Problems     Occupational Therapy Goals        Problem: Occupational Therapy    Goal Priority Disciplines Outcome Interventions   Occupational Therapy Goal     OT, PT/OT Ongoing, Progressing    Description: Goals to be met by: 4/21/22    Patient will increase functional independence with ADLs by performing:    UE Dressing with Pepin.  LE Dressing with Modified Pepin.  Grooming while standing with Pepin.  Toileting from toilet with Pepin for hygiene and clothing management.   Toilet transfer to toilet with Modified Pepin.  Pt will verbalize and demonstrate understanding of 3/3 spinal precautions with 100% accuracy.                     History:     Past Medical History:   Diagnosis Date    GERD (gastroesophageal reflux disease)     Hypertension          Past Surgical History:   Procedure Laterality Date    ELBOW SURGERY Left 1994    [facility] in [place]     FLEXIBLE CYSTOSCOPY  4/6/2022    Procedure: CYSTOSCOPY, FLEXIBLE with placement of miranda catheter;  Surgeon: Dominic Iverson MD;  Location: [address];  Service: Urology;;    FUSION OF POSTERIOR COLUMN OF LUMBAR SPINE USING COMPUTER-ASSISTED NAVIGATION  4/6/2022    Procedure: FUSION, SPINE, POSTERIOR SPINAL COLUMN, LUMBAR, USING COMPUTER-ASSISTED NAVIGATION;  Surgeon: Susanne Tiwari MD;  Location: [address];  Service: Neurosurgery;;  L3-iliac    LUMBAR LAMINECTOMY  4/6/2022    Procedure: LAMINECTOMY, SPINE, LUMBAR;  Surgeon: Susanne Tiwari MD;  Location: [address];  Service: Neurosurgery;;  L3-S1       Time Tracking:     OT Date of Treatment:  04/07/22  OT Start Time: 1133  OT Stop Time: 1155  OT Total Time (min): 22 min    Billable Minutes:Evaluation 10 minutes  Self Care/Home Management 12 minutes     Co treament performed due to patient's multiple deficits requiring two skilled therapists to safety assess patient's ability to complete ADLs and functional mobility in addition to accommodating for patient's activity tolerance while in acute care setting.       4/7/2022

## 2022-04-07 NOTE — SUBJECTIVE & OBJECTIVE
Interval History:  NAEON. Reports lower back pain not well controlled on current regimen. Neuro stable. Little in place per Urology. Drains with high output, keep in place. PT/OT today and post op XR pending.     Medications:  Continuous Infusions:  Scheduled Meds:   amLODIPine  10 mg Oral Daily    ceFAZolin (ANCEF) IVPB  2 g Intravenous Q8H    gabapentin  300 mg Oral TID    heparin (porcine)  5,000 Units Subcutaneous Q8H    mupirocin   Nasal BID    polyethylene glycol  17 g Oral Daily    senna-docusate 8.6-50 mg  1 tablet Oral Daily    tiZANidine  4 mg Oral Daily     PRN Meds:acetaminophen, aluminum-magnesium hydroxide-simethicone, diazePAM, HYDROcodone-acetaminophen, HYDROcodone-acetaminophen, melatonin, morphine, ondansetron, oxyCODONE-acetaminophen, prochlorperazine, senna-docusate 8.6-50 mg, sodium chloride 0.9%     Review of Systems  Objective:     Weight: 113.4 kg (250 lb)  Body mass index is 35.87 kg/m².  Vital Signs (Most Recent):  Temp: 97.3 °F (36.3 °C) (04/07/22 0731)  Pulse: 87 (04/07/22 0731)  Resp: 18 (04/07/22 0804)  BP: 134/83 (04/07/22 0731)  SpO2: (!) 94 % (04/07/22 0731)   Vital Signs (24h Range):  Temp:  [97.2 °F (36.2 °C)-98.7 °F (37.1 °C)] 97.3 °F (36.3 °C)  Pulse:  [] 87  Resp:  [11-35] 18  SpO2:  [90 %-100 %] 94 %  BP: (107-172)/() 134/83                          Closed/Suction Drain 04/06/22 1549 Left Back Accordion 10 Fr. (Active)   Dressing Type Transparent (Tegaderm) 04/07/22 0400   Dressing Status Clean;Dry;Intact 04/07/22 0400   Dressing Intervention Integrity maintained 04/07/22 0400   Drainage Serous 04/07/22 0400   Status Other (Comment) 04/06/22 1950   Output (mL) 120 mL 04/07/22 0600            Closed/Suction Drain 04/06/22 1551 Right Back Accordion 10 Fr. (Active)   Dressing Type Transparent (Tegaderm) 04/07/22 0400   Dressing Status Clean;Dry;Intact 04/07/22 0400   Dressing Intervention Integrity maintained 04/07/22 0400   Drainage Serous 04/07/22 0400   Output (mL)  "160 mL 04/07/22 0600            Urethral Catheter 04/06/22 0915 Latex 16 Fr. (Active)   Site Assessment Clean;Intact 04/07/22 0400   Collection Container Urimeter 04/07/22 0400   Securement Method secured to top of thigh w/ adhesive device 04/07/22 0400   Catheter Care Performed yes 04/07/22 0400   Reason for Continuing Urinary Catheterization Post operative 04/07/22 0400   CAUTI Prevention Bundle Securement Device in place with 1" slack;Intact seal between catheter & drainage tubing;Drainage bag/urimeter off the floor;Sheeting clip in use;Drainage bag/urimeter not overfilled (<2/3 full);No dependent loops or kinks;Drainage bag/urimeter below bladder 04/06/22 2036   Output (mL) 400 mL 04/06/22 2345       Physical Exam    Neurosurgery Physical Exam  General: well developed, well nourished, no distress.   Head: normocephalic, atraumatic  Neurologic: Alert and oriented. Thought content appropriate.  GCS: Motor: 6/Verbal: 5/Eyes: 4 GCS Total: 15  Mental Status: Awake, Alert, Oriented x 4  Language: No aphasia  Speech: No dysarthria  Cranial nerves: face symmetric, tongue midline, CN II-XII grossly intact.   Eyes: pupils equal, round, reactive to light with accommodation, EOMI.   Pulmonary: normal respirations, no signs of respiratory distress  Abdomen: soft, non-distended, not tender to palpation  Skin: Skin is warm, dry and intact.  Sensory: intact to light touch throughout    Motor Strength:Moves all extremities spontaneously with good tone.   No abnormal movements seen.     Strength  Deltoids Triceps Biceps Wrist Extension Wrist Flexion Hand    Upper: R 5/5 5/5 5/5 5/5 5/5 5/5    L 5/5 5/5 5/5 5/5 5/5 5/5     Iliopsoas Quadriceps Knee  Flexion Tibialis  anterior Gastro- cnemius EHL   Lower: R 4/5 5/5 5/5 5/5 5/5 5/5    L 4/5 5/5 5/5 5/5 5/5 4/5   Pain limited weakness in proximal lower extremities     Incision: Wound vac in place with good seal. 2 HV drains in place to full suction.     Significant Labs:  Recent " Labs   Lab 04/06/22  0648 04/06/22  1726 04/07/22  0745    154* 107    141 139   K 4.0 4.4 4.0    113* 107   CO2 24 22* 26   BUN 10 7 10   CREATININE 0.9 0.8 1.1   CALCIUM 8.8 7.4* 8.0*     Recent Labs   Lab 04/06/22  0648 04/06/22  1109 04/06/22  1445 04/06/22  1726 04/07/22  0745   WBC 7.62  --   --  17.03* 12.95*   HGB 14.1  --   --  12.1* 11.0*   HCT 42.6   < > 38 36.3* 33.4*     --   --  147* 132*    < > = values in this interval not displayed.     Recent Labs   Lab 04/06/22  0648   INR 1.0   APTT 29.6     Microbiology Results (last 7 days)       ** No results found for the last 168 hours. **          All pertinent labs from the last 24 hours have been reviewed.    Significant Diagnostics:  No results found in the last 24 hours.

## 2022-04-07 NOTE — PT/OT/SLP EVAL
"Physical Therapy Co-Evaluation and Treatment    Patient Name:  Chris Mahmood   MRN:  [id number]    Recommendations:     Discharge Recommendations:  outpatient PT   Discharge Equipment Recommendations: walker, rolling   Barriers to discharge: None    Assessment:       Chris Mahmood is a 51 y.o. male admitted with a medical diagnosis of Spondylolisthesis of lumbosacral region.  He presents with the following impairments/functional limitations:  weakness, impaired endurance, pain, impaired skin, decreased safety awareness, impaired functional mobilty, gait instability.  Pt participated in functional mobility training and education. Pt required min assist for bed mobility, CGA for transfers, and CGA to ambulate ~30 ft with RW. Eduation provided re: use of TLSO, spinal precautions, log rolling. Pt motivated to progress mobility to return towards independence. Pt continues to present below their independent prior functional baseline. Pt would benefit from continued, skilled PT while in house to address the above listed impairments, further progress mobility as able, return towards highest level of function.      Rehab Prognosis: Good; patient would benefit from acute skilled PT services 4 x/week to address these deficits and reach maximum level of function.  Patient is most appropriate to go to outpatient PT .  Recent Surgery: Procedure(s) (LRB):  FUSION, SPINE, LUMBAR, TLIF, POSTERIOR APPROACH, USING PEDICLE SCREW (N/A)  CYSTOSCOPY, FLEXIBLE with placement of miranda catheter  FUSION, SPINE, POSTERIOR SPINAL COLUMN, LUMBAR, USING COMPUTER-ASSISTED NAVIGATION  LAMINECTOMY, SPINE, LUMBAR 1 Day Post-Op    Plan:     During this hospitalization, patient to be seen 4 x/week to address the identified rehab impairments via gait training, therapeutic activities, therapeutic exercises, neuromuscular re-education and progress toward the following goals:    · Plan of Care Expires:  05/06/22    Subjective     Subjective:"I want to " "get back to moving, I want to do whatever I can"  Pt goal: return to independence  Pain/Comfort:  · Pain Rating 1: 10/10 (pt c/o incisional back pain)  · Pain Addressed 1: Reposition, Distraction, Pre-medicate for activity    Patients cultural, spiritual, Spiritism conflicts given the current situation: no    Living Environment: Pt lives with spouse in a H with no LUIS  Prior level of function:  Independent, recently started using cane, 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  Patient found supine with hemovac, wound vac, miranda catheter  upon PT entry to room.    Co-treatment performed for this visit due to patient need for two skilled therapists to ensure patient and staff safety and to accommodate for patient activity tolerance/pain management     General Precautions: Standard, fall   Orthopedic Precautions:spinal precautions   Braces: TLSO     Exams:  · Cognition: intact  · RLE ROM: WFL  · RLE Strength: grossly 4/5  · LLE ROM: WFL  · LLE Strength: grossly 4/5  · Posture: rounded shoulders, forward head  · Integumentary: surgical incision not visualized  · Sensation: intact light touch BLE/BUE    Functional Mobility:  · Bed Mobility: min assist for supine>sit via log rolling technique with HOB slightly elevated  · Transfers: CGA for sit<>stand with use of RW, cueing for hand placement  · Gait: Pt ambulated ~30 ft with RW and CGA. Pt requires increased time for gait, demo's decreased jessica, increased BCEK, and increased time in single leg stance. Frequent cueing to maintain closeness to RW with non-reciprocal gait pattern  · Balance:   · Sitting: intact  · Standing: CGA with use of RW, no overt LOB noted    Therapeutic activities and education:  Education provided to pt re: d/c planning, spine precautions, use of " TLSO, log rolling technique, safety in mobility. Pt endorses understanding.     AM-PAC 6 CLICK MOBILITY  Total Score:18     Patient left up in chair with all lines intact, call button in reach and chair alarm on.    GOALS:   Multidisciplinary Problems     Physical Therapy Goals        Problem: Physical Therapy    Goal Priority Disciplines Outcome Goal Variances Interventions   Physical Therapy Goal     PT, PT/OT Ongoing, Progressing     Description: Goals to be met by: 22    Patient will increase functional independence with mobility by performin. Pt will perform supine<>sit with modified independence to improve independence with mobility  2. Pt will perform functional transfers with independence   3. Pt will ambulate >150 ft feet with LRAD and modified independence to safely perform household distance ambulation   4. Pt will recall 3/3 spinal precautions independently                    History:     Past Medical History:   Diagnosis Date    GERD (gastroesophageal reflux disease)     Hypertension        Past Surgical History:   Procedure Laterality Date    ELBOW SURGERY Left     [facility] in [place]     FLEXIBLE CYSTOSCOPY  2022    Procedure: CYSTOSCOPY, FLEXIBLE with placement of miranda catheter;  Surgeon: Dominic Iverson MD;  Location: [address];  Service: Urology;;    FUSION OF POSTERIOR COLUMN OF LUMBAR SPINE USING COMPUTER-ASSISTED NAVIGATION  2022    Procedure: FUSION, SPINE, POSTERIOR SPINAL COLUMN, LUMBAR, USING COMPUTER-ASSISTED NAVIGATION;  Surgeon: Susanne Tiwari MD;  Location: [facility] OR [address] [place] [address];  Service: Neurosurgery;;  L3-iliac    LUMBAR LAMINECTOMY  2022    Procedure: LAMINECTOMY, SPINE, LUMBAR;  Surgeon: Susanne Tiwari MD;  Location: [facility] OR [address] [place] [address];  Service: Neurosurgery;;  L3-S1       Time Tracking:     PT Received On: 22  PT Start Time: 1133     PT Stop Time: 1156  PT Total Time (min): 23 min     Billable Minutes: Evaluation 1 unit and Gait  Training 10 min      Nadja Costello, PT, DPT, GCS  04/07/2022

## 2022-04-07 NOTE — TRANSFER OF CARE
"Anesthesia Transfer of Care Note    Patient: Chris Mahmood    Procedure(s) Performed: Procedure(s) (LRB):  FUSION, SPINE, LUMBAR, TLIF, POSTERIOR APPROACH, USING PEDICLE SCREW (N/A)  CYSTOSCOPY, FLEXIBLE with placement of miranda catheter  FUSION, SPINE, POSTERIOR SPINAL COLUMN, LUMBAR, USING COMPUTER-ASSISTED NAVIGATION  LAMINECTOMY, SPINE, LUMBAR    Patient location: PACU    Anesthesia Type: general    Transport from OR: Transported from OR on 6-10 L/min O2 by face mask with adequate spontaneous ventilation    Post pain: pain needs to be addressed (given 100 mcg fent in PACU as well as 32 mcg precedex. Patient comfortable before leaving bedside)    Post vital signs: stable    Level of consciousness: awake    Nausea/Vomiting: no nausea/vomiting    Complications: none    Transfer of care protocol was followed      Last vitals:   Visit Vitals  /79   Pulse 80   Temp 36.2 °C (97.2 °F) (Temporal)   Resp 11   Ht 5' 10" (1.778 m)   Wt 113.4 kg (250 lb)   SpO2 (!) 92%   BMI 35.87 kg/m²     "

## 2022-04-07 NOTE — PLAN OF CARE
[facility] - Neurosurgery (Hospital)  Initial Discharge Assessment       Primary Care Provider: Desi Oliveira MD    Admission Diagnosis: Spondylolisthesis, lumbosacral region [M43.17]  Spondylolisthesis of lumbar region [M43.16]    Admission Date: 4/6/2022  Expected Discharge Date:     Discharge Barriers Identified: None    Payor: MEDICAID / Plan: [facility] COMMUNITY PLAN [facility] ([place] MEDICAID) / Product Type: Managed Medicaid /     Extended Emergency Contact Information  Primary Emergency Contact: Summer Osorio   [facility]  Home Phone: [phone]  Mobile Phone: [phone]  Relation: Significant other  Preferred language: English   needed? No    Discharge Plan A: Home with family  Discharge Plan B: Rehab      [facility] - [place] - [address]  [address] [place] [address]  Phone: [phone] Fax: [phone]      Initial Assessment (most recent)     Adult Discharge Assessment - 04/07/22 1024        Discharge Assessment    Assessment Type Discharge Planning Assessment     Confirmed/corrected address, phone number and insurance Yes     Confirmed Demographics Correct on Facesheet     Source of Information family     Communicated JADA with patient/caregiver Date not available/Unable to determine     Lives With significant other     Do you expect to return to your current living situation? Yes     Do you have help at home or someone to help you manage your care at home? Yes     Who are your caregiver(s) and their phone number(s)? Summer Osorio - S/O [phone]     Prior to hospitilization cognitive status: Alert/Oriented     Current cognitive status: Alert/Oriented     Walking or Climbing Stairs Difficulty none     Dressing/Bathing Difficulty none     Equipment Currently Used at Home none     Readmission within 30 days? No     Patient currently being followed by outpatient case management? No     Do you currently have service(s) that help  you manage your care at home? No     Do you take prescription medications? Yes     Do you have prescription coverage? Yes     Coverage MEDICAID - [facility] COMMUNITY PLAN [facility] ([place] MEDICAID     Do you have any problems affording any of your prescribed medications? No     Is the patient taking medications as prescribed? yes     Who is going to help you get home at discharge? family     How do you get to doctors appointments? family or friend will provide     Are you on dialysis? No     Do you take coumadin? No     Discharge Plan A Home with family     Discharge Plan B Rehab     DME Needed Upon Discharge  other (see comments)   tbd    Discharge Barriers Identified None        Relationship/Environment    Name(s) of Who Lives With Patient Summer

## 2022-04-07 NOTE — PLAN OF CARE
Problem: Occupational Therapy  Goal: Occupational Therapy Goal  Description: Goals to be met by: 4/21/22    Patient will increase functional independence with ADLs by performing:    UE Dressing with [place].  LE Dressing with Modified [place].  Grooming while standing with [place].  Toileting from toilet with [place] for hygiene and clothing management.   Toilet transfer to toilet with Modified [place].  Pt will verbalize and demonstrate understanding of 3/3 spinal precautions with 100% accuracy.    Outcome: Ongoing, Progressing     OT eval complete. Highly motivated, anticipate rapid progression. DC: OP OT/PT. DME: RW, possibly TTB

## 2022-04-07 NOTE — ADDENDUM NOTE
Addendum  created 04/06/22 1957 by Arsh Fernando MD    Clinical Note Signed, Flowsheet accepted, Intraprocedure Event edited, Intraprocedure Flowsheets edited, Intraprocedure Meds edited

## 2022-04-07 NOTE — NURSING TRANSFER
Nursing Transfer Note      4/6/2022     Reason patient is being transferred: post procedure     Transfer To: 903    Transfer via bed    Transfer with n/a    Transported by transporter    Medicines sent: n/a    Any special needs or follow-up needed: n/a    Chart send with patient: Yes    Notified: significant other     Patient reassessed at: 4/6/22 @1950

## 2022-04-07 NOTE — ASSESSMENT & PLAN NOTE
Chris Mahmood is a 51 y.o. male with severe stenosis and dynamic instability with L4-L5/L5-S1 spondylolisthesis. Now s/p L4-5, L5-S1 TLIF and L3-iliac fusion on 4/6/22    - Neuro stable  - HV drains in place x 2. Left with 400 cc, Right with 520 cc. Keep in place. Abx while in place  - WV x 5 days  - Post op H/H stable.   - Post op XR pending, to be performed standing  - Brace to be worn when up and OOB  - Pain control: not well controlled currently. Added scheduled tylenol 1 g tid. Oxycodone 5 mg q 4 hours prn moderate pain. Oxycodone 10 mg q 4 hours prn severe pain. Morphine IV for breakthrough pain.   - Little in place per Urology. Keep in place for 3-5 days, will keep for full 5 days if still inpatient.   - PT/OT/OOB  - DVT prophylaxis: TEDs/SCDs/SQH  - Bowel regimen: senna and miralax daily     DISPO: ongoing, pending PT/OT recs and drain removal

## 2022-04-07 NOTE — HOSPITAL COURSE
4/7: NAEON. Reports lower back pain not well controlled on current regimen. Neuro stable. Miranda in place per Urology. Drains with high output, keep in place. PT/OT today and post op XR pending.   4/8: NAEON. Pain better controlled today. XR shows good position of hardware. No paresthesias. Miranda still in place. Drains with high output. Home with home health pending drain removal   4/9: NAEON. Pain continues to be controlled. WV not holding good seal, removed without complication. L drain with 40 cc output, removed without complication. Right drain in place for 150 cc output. Neuro exam stable. Lactulose added for constipation as well as prn suppository. Plan for miranda removal early Monday AM.   4/10: No acute event overnight.  Pain will control.  Miranda still in place and making good urine.  Radicular pain is gone.  Tolerating p.o. intake.  Had a bowel movement yesterday.  Drain with minimal output in the canister but not documented yet.  He has been out of bed to the chair.  PT OT cleared for home.  Potential discharge today pending fully removal and drain output and voiding trial.

## 2022-04-08 LAB
ANION GAP SERPL CALC-SCNC: 6 MMOL/L (ref 8–16)
BASOPHILS # BLD AUTO: 0.06 K/UL (ref 0–0.2)
BASOPHILS NFR BLD: 0.4 % (ref 0–1.9)
BUN SERPL-MCNC: 9 MG/DL (ref 6–20)
CALCIUM SERPL-MCNC: 8.1 MG/DL (ref 8.7–10.5)
CHLORIDE SERPL-SCNC: 100 MMOL/L (ref 95–110)
CO2 SERPL-SCNC: 25 MMOL/L (ref 23–29)
CREAT SERPL-MCNC: 0.9 MG/DL (ref 0.5–1.4)
DIFFERENTIAL METHOD: ABNORMAL
EOSINOPHIL # BLD AUTO: 0 K/UL (ref 0–0.5)
EOSINOPHIL NFR BLD: 0.3 % (ref 0–8)
ERYTHROCYTE [DISTWIDTH] IN BLOOD BY AUTOMATED COUNT: 14 % (ref 11.5–14.5)
EST. GFR  (AFRICAN AMERICAN): >60 ML/MIN/1.73 M^2
EST. GFR  (NON AFRICAN AMERICAN): >60 ML/MIN/1.73 M^2
GLUCOSE SERPL-MCNC: 133 MG/DL (ref 70–110)
HCT VFR BLD AUTO: 32.6 % (ref 40–54)
HGB BLD-MCNC: 10.7 G/DL (ref 14–18)
IMM GRANULOCYTES # BLD AUTO: 0.06 K/UL (ref 0–0.04)
IMM GRANULOCYTES NFR BLD AUTO: 0.4 % (ref 0–0.5)
LYMPHOCYTES # BLD AUTO: 2.3 K/UL (ref 1–4.8)
LYMPHOCYTES NFR BLD: 16.4 % (ref 18–48)
MCH RBC QN AUTO: 31.5 PG (ref 27–31)
MCHC RBC AUTO-ENTMCNC: 32.8 G/DL (ref 32–36)
MCV RBC AUTO: 96 FL (ref 82–98)
MONOCYTES # BLD AUTO: 1.7 K/UL (ref 0.3–1)
MONOCYTES NFR BLD: 11.7 % (ref 4–15)
NEUTROPHILS # BLD AUTO: 10.1 K/UL (ref 1.8–7.7)
NEUTROPHILS NFR BLD: 70.8 % (ref 38–73)
NRBC BLD-RTO: 0 /100 WBC
PLATELET # BLD AUTO: 145 K/UL (ref 150–450)
PMV BLD AUTO: 11.2 FL (ref 9.2–12.9)
POTASSIUM SERPL-SCNC: 3.8 MMOL/L (ref 3.5–5.1)
RBC # BLD AUTO: 3.4 M/UL (ref 4.6–6.2)
SODIUM SERPL-SCNC: 131 MMOL/L (ref 136–145)
WBC # BLD AUTO: 14.21 K/UL (ref 3.9–12.7)

## 2022-04-08 PROCEDURE — 11000001 HC ACUTE MED/SURG PRIVATE ROOM

## 2022-04-08 PROCEDURE — 25000003 PHARM REV CODE 250: Performed by: STUDENT IN AN ORGANIZED HEALTH CARE EDUCATION/TRAINING PROGRAM

## 2022-04-08 PROCEDURE — 36415 COLL VENOUS BLD VENIPUNCTURE: CPT | Performed by: STUDENT IN AN ORGANIZED HEALTH CARE EDUCATION/TRAINING PROGRAM

## 2022-04-08 PROCEDURE — 80048 BASIC METABOLIC PNL TOTAL CA: CPT | Performed by: STUDENT IN AN ORGANIZED HEALTH CARE EDUCATION/TRAINING PROGRAM

## 2022-04-08 PROCEDURE — 63600175 PHARM REV CODE 636 W HCPCS: Performed by: PHYSICIAN ASSISTANT

## 2022-04-08 PROCEDURE — 97116 GAIT TRAINING THERAPY: CPT

## 2022-04-08 PROCEDURE — 99024 POSTOP FOLLOW-UP VISIT: CPT | Mod: ,,, | Performed by: PHYSICIAN ASSISTANT

## 2022-04-08 PROCEDURE — 94761 N-INVAS EAR/PLS OXIMETRY MLT: CPT

## 2022-04-08 PROCEDURE — 85025 COMPLETE CBC W/AUTO DIFF WBC: CPT | Performed by: STUDENT IN AN ORGANIZED HEALTH CARE EDUCATION/TRAINING PROGRAM

## 2022-04-08 PROCEDURE — 25000003 PHARM REV CODE 250: Performed by: PHYSICIAN ASSISTANT

## 2022-04-08 PROCEDURE — 99024 PR POST-OP FOLLOW-UP VISIT: ICD-10-PCS | Mod: ,,, | Performed by: PHYSICIAN ASSISTANT

## 2022-04-08 PROCEDURE — 99900035 HC TECH TIME PER 15 MIN (STAT)

## 2022-04-08 PROCEDURE — 63600175 PHARM REV CODE 636 W HCPCS: Performed by: STUDENT IN AN ORGANIZED HEALTH CARE EDUCATION/TRAINING PROGRAM

## 2022-04-08 RX ORDER — CALCIUM CARBONATE 200(500)MG
500 TABLET,CHEWABLE ORAL 2 TIMES DAILY
Status: DISCONTINUED | OUTPATIENT
Start: 2022-04-08 | End: 2022-04-09

## 2022-04-08 RX ADMIN — HEPARIN SODIUM 5000 UNITS: 5000 INJECTION INTRAVENOUS; SUBCUTANEOUS at 05:04

## 2022-04-08 RX ADMIN — Medication 2 G: at 09:04

## 2022-04-08 RX ADMIN — MORPHINE SULFATE 2 MG: 2 INJECTION, SOLUTION INTRAMUSCULAR; INTRAVENOUS at 09:04

## 2022-04-08 RX ADMIN — OXYCODONE HYDROCHLORIDE 10 MG: 10 TABLET ORAL at 08:04

## 2022-04-08 RX ADMIN — ACETAMINOPHEN 1000 MG: 500 TABLET ORAL at 09:04

## 2022-04-08 RX ADMIN — HEPARIN SODIUM 5000 UNITS: 5000 INJECTION INTRAVENOUS; SUBCUTANEOUS at 09:04

## 2022-04-08 RX ADMIN — GABAPENTIN 300 MG: 300 CAPSULE ORAL at 09:04

## 2022-04-08 RX ADMIN — ACETAMINOPHEN 1000 MG: 500 TABLET ORAL at 03:04

## 2022-04-08 RX ADMIN — CALCIUM CARBONATE (ANTACID) CHEW TAB 500 MG 500 MG: 500 CHEW TAB at 11:04

## 2022-04-08 RX ADMIN — Medication 2 G: at 05:04

## 2022-04-08 RX ADMIN — GABAPENTIN 300 MG: 300 CAPSULE ORAL at 08:04

## 2022-04-08 RX ADMIN — MUPIROCIN: 20 OINTMENT TOPICAL at 09:04

## 2022-04-08 RX ADMIN — SENNOSIDES AND DOCUSATE SODIUM 1 TABLET: 50; 8.6 TABLET ORAL at 08:04

## 2022-04-08 RX ADMIN — CALCIUM CARBONATE (ANTACID) CHEW TAB 500 MG 500 MG: 500 CHEW TAB at 09:04

## 2022-04-08 RX ADMIN — HEPARIN SODIUM 5000 UNITS: 5000 INJECTION INTRAVENOUS; SUBCUTANEOUS at 03:04

## 2022-04-08 RX ADMIN — MORPHINE SULFATE 2 MG: 2 INJECTION, SOLUTION INTRAMUSCULAR; INTRAVENOUS at 05:04

## 2022-04-08 RX ADMIN — DIAZEPAM 5 MG: 5 TABLET ORAL at 05:04

## 2022-04-08 RX ADMIN — TIZANIDINE 4 MG: 4 TABLET ORAL at 08:04

## 2022-04-08 RX ADMIN — Medication 2 G: at 03:04

## 2022-04-08 RX ADMIN — AMLODIPINE BESYLATE 10 MG: 10 TABLET ORAL at 08:04

## 2022-04-08 RX ADMIN — OXYCODONE HYDROCHLORIDE 10 MG: 10 TABLET ORAL at 05:04

## 2022-04-08 RX ADMIN — ACETAMINOPHEN 1000 MG: 500 TABLET ORAL at 05:04

## 2022-04-08 RX ADMIN — OXYCODONE HYDROCHLORIDE 10 MG: 10 TABLET ORAL at 12:04

## 2022-04-08 RX ADMIN — DIAZEPAM 5 MG: 5 TABLET ORAL at 09:04

## 2022-04-08 RX ADMIN — POLYETHYLENE GLYCOL 3350 17 G: 17 POWDER, FOR SOLUTION ORAL at 08:04

## 2022-04-08 RX ADMIN — DIAZEPAM 5 MG: 5 TABLET ORAL at 11:04

## 2022-04-08 RX ADMIN — GABAPENTIN 300 MG: 300 CAPSULE ORAL at 03:04

## 2022-04-08 NOTE — PLAN OF CARE
Problem: Adult Inpatient Plan of Care  Goal: Plan of Care Review  Outcome: Ongoing, Progressing  Goal: Patient-Specific Goal (Individualized)  Outcome: Ongoing, Progressing  Goal: Optimal Comfort and Wellbeing  Outcome: Ongoing, Progressing     Problem: Infection  Goal: Absence of Infection Signs and Symptoms  Outcome: Ongoing, Progressing   POC reviewed and updated with the patient. Questions regarding POC were encouraged and addressed with the patient.  VSS, see flow-sheets. Patient is AO X * 4 at this time. Fall/safety precautions maintained, no signs of injury noted during shift. Pt has woundvac and  hemovac x 2 in place. Little is to stay in place for 3-5 days. Pain management is goal.   Patient instructed to call staff for mobility, verbalized understanding.  No acute signs of distress noted at this time.

## 2022-04-08 NOTE — PT/OT/SLP PROGRESS
"Physical Therapy Treatment    Patient Name:  Chris Mahmood   MRN:  [id number]    Recommendations:     Discharge Recommendations:  outpatient PT   Discharge Equipment Recommendations: walker, rolling   Barriers to discharge: None    Assessment:     Chris Mahmood is a 51 y.o. male admitted with a medical diagnosis of Spondylolisthesis of lumbosacral region. Patient demonstrates improved activity tolerance as demonstrated by increased gait distance .     He presents with the following impairments/functional limitations: weakness, impaired endurance, pain, impaired skin, decreased safety awareness, impaired functional mobilty, gait instability. These deficits affect their roles and responsibilities in which they were able to complete prior to admit. Chris Mahmood would continue to benefit from acute PT intervention to improve quality of life and focus on recovery of impairments. Once medically stable, recommending pt discharge to Outpatient PT.    Rehab Prognosis: Good; patient continues to benefit from acute skilled PT services to address these deficits and reach maximum level of function.  Patient remains most appropriate to discharge to outpatient PT.  Recent Surgery: Procedure(s) (LRB):  FUSION, SPINE, LUMBAR, TLIF, POSTERIOR APPROACH, USING PEDICLE SCREW (N/A)  CYSTOSCOPY, FLEXIBLE with placement of miranda catheter  FUSION, SPINE, POSTERIOR SPINAL COLUMN, LUMBAR, USING COMPUTER-ASSISTED NAVIGATION  LAMINECTOMY, SPINE, LUMBAR 2 Days Post-Op    Plan:     During this hospitalization, patient to be seen 4 x/week to address the identified rehab impairments via gait training, therapeutic activities, therapeutic exercises and progress toward the following goals:    · Plan of Care Expires:  05/06/22    Subjective     Chief Complaint: Back pain with mobility  Patient/Family Comments/Goals: "I feel better having done that"  Pain/Comfort:  Pain Rating 1: 10/10  Location - Orientation 1: generalized  Location 1: " back  Pain Addressed 1: Distraction  Pain Rating Post-Intervention 1:  (not rated)    Objective:     Communicated with RN prior to session. Patient found up in chair with miranda catheter, TLSO, hemovac, wound vac upon PT entry to room.     General Precautions: Standard, fall   Orthopedic Precautions:spinal precautions   Braces: TLSO    Functional Mobility:  · Bed Mobility:     · Seated in chair at start of session and returned to chair  · Transfers:     · Sit to Stand: stand by assistance with rolling walker  · Gait: Patient ambulated 60 ft with rolling walker and contact guard assistance. Patient demonstrates ambulates outside BECK of RW, flexed posture and bilateral knee flexion. Provided cuing for upright posture, patient reports this is baseline for 15 years, additional cues provided to ambulate within BECK of RW. All lines remained intact throughout ambulation trial, gait belt utilized, mask donned for out of room ambulation.  · Balance:   · Static Sitting: Good, able to maintain for 2 minute(s) with stand by assistance  · Dynamic Sitting: not assessed this visit  · Static Standing: Good, able to maintain for 15 seconds with contact guard assistance  · Dynamic Standing: Fair: Patient accepts minimal challenge, contact guard assistance    AM-PAC 6 CLICK MOBILITY  Turning over in bed (including adjusting bedclothes, sheets and blankets)?: 3  Sitting down on and standing up from a chair with arms (e.g., wheelchair, bedside commode, etc.): 3  Moving from lying on back to sitting on the side of the bed?: 3  Moving to and from a bed to a chair (including a wheelchair)?: 3  Need to walk in hospital room?: 3  Climbing 3-5 steps with a railing?: 3  Basic Mobility Total Score: 18     Therapeutic Activities and Exercises:  Patient educated on role of acute care PT and PT POC, safety while in hospital including calling nurse for mobility and call light usage  Educated on spinal precautions including avoidance of bending,  lifting, and twisting. Patient expresses understanding  Whiteboard updated, Patient is clear to ambulate to/from bathroom with RN/PCT, assist x1. Bedside commode placed over toilet to improve efficiency of toilet transfer    Patient left up in chair with all lines intact, call button in reach and RN notified.    GOALS:   Multidisciplinary Problems     Physical Therapy Goals        Problem: Physical Therapy    Goal Priority Disciplines Outcome Goal Variances Interventions   Physical Therapy Goal     PT, PT/OT Ongoing, Progressing     Description: Goals to be met by: 22    Patient will increase functional independence with mobility by performin. Pt will perform supine<>sit with modified independence to improve independence with mobility  2. Pt will perform functional transfers with independence   3. Pt will ambulate >150 ft feet with LRAD and modified independence to safely perform household distance ambulation   4. Pt will recall 3/3 spinal precautions independently                    Time Tracking:     PT Received On: 22  PT Start Time: 1343     PT Stop Time: 1401  PT Total Time (min): 18 min     Billable Minutes: Gait Training 15 min     Treatment Type: Treatment  PT/PTA: PT     PTA Visit Number: 0     2022

## 2022-04-08 NOTE — PHYSICIAN QUERY
PT Name: Chris Mahmood  MR #: [id number]    DOCUMENTATION CLARIFICATION      CDS/:  Christopher Dunn RN, CDS                   Contact Information:  [email or web address]      This form is a permanent document in the medical record.      Query Date: April 8, 2022    By submitting this query, we are merely seeking further clarification of documentation. Please utilize your independent clinical judgment when addressing the question(s) below.    The Medical Record contains the following:   Indicators  Supporting Clinical Findings Location in Medical Record    Anemia documented     X H&H H&H 14.1 & 42.6    H&H 12.1 & 36.3    H&H 11 & 33.4    H&H 10.7 & 32.6   Labs 4/6 0648    Labs 4/6 1726    Labs 4/7 0745    Labs 4/8 0725   X BP                    HR   RR 35  /79      RR 18  /95   VS FS 4/6 1700        VS FS 4/7 1931    GI bleeding documented      Acute bleeding (Non GI site)     X Transfusion(s) Transfuse RBC Completed 04/06/22 1658 200 mL    Other Orders   X Acute/Chronic illness Spondylolisthesis, lumbosacral region    OP Note 4/6   X Treatments 1. Posterior arthrodesis and fusion, L3-pelvis (DePuy Verse and Expedium Poly)   2. Transforaminal lumbar interbody fusion L5/S1 (Spine Wave Leva PX 25mm x 10mm x 10mm expandable)  3. Transforaminal lumbar interbody fusion, L4/5(Spine Wave Leva PX 25mm x 10mm x 13mm expandable)  5.  Decompression of thecal sac and nerve roots via laminectomy, bilateral foraminotomies, and medial facetectomy (left) L4/5, L5-S1  OP Note 4/6                     X Other Estimated Blood Loss (EBL): 1600 mL OP Note 4/6       Provider, please specify diagnosis or diagnoses associated with above clinical findings.     [   ] Acute blood loss anemia      [ x ] Acute blood loss anemia expected post-operatively      [   ] Other Hematological Diagnosis (please specify): _________________     [   ] Clinically Undetermined             Please document in your progress  notes daily for the duration of treatment, until resolved, and include in your discharge summary.    Form No. [id number]

## 2022-04-08 NOTE — PROGRESS NOTES
"[facility] - Neurosurgery ([facility])  Neurosurgery  Progress Note    Subjective:     History of Present Illness: "Chris Mahmood is a 50 y.o. male with PMH of GERD, current smoker, and HTN. He is being seen in clinic today to discuss his concerns with progressive low back and bilateral leg pain. States that he has always struggled with his back due to his profession as a ; however, in 2015 the pain became so severe that it started to affect his ability to perform his work duties. He was recently laid off from his job as a result of the COVID pandemic. Denies trauma.      "Describes the pain as constant, aching, and stabbing across his low back bilaterally with radiation in the posterior aspect of his thighs L > right. States that intermittently with ambulation he experiences sharp shooting pain in anterior and lateral aspects of his legs primarily from the knees down. Rates the back pain as a 10/10 and the left leg as a 8/10 and the right leg as a 6/10. Aggravating factors include walking, prolonged standing, and lying down. Alleviating factors include sitting and bending forward to stretch. He ambulates without the use of assistive devices. Denies b/b dysfunction, saddle anesthesia, or gait instability. Reports weakness in the legs L > R as a result of the pain; as well as numbness and tingling primarily in the L5/S1 dermatome on the left when the pain is severe. The patient attempted PT, but was unable to continue the sessions due to the pain. He has an appointment later this month with pain management to discuss ANA."      As of 6/24/21, the patient reports that he sees pain management tomorrow for discussion of ANA. He has severe, debilitating pain in his left leg 99% of the time. His symptoms are now sever even when sitting down. He does get some relief with bending over, as over a shopping cart.       He denies bleeding, infectious, or anesthetic complications from his previous elbow " "surgery. He smokes 2-3 cigars daily, and he drinks a little moonshine.      As of 2/17/22, the patient reports that he has been getting ANA and RFA, which have been helpful, but the pain is "coming back with a vengeance." He states that he is unable to keep living with the current level of pain he's experiencing.      Both legs hurt, but the left leg has worse shooting pain. This is different than when he got his previous MRI.      He is still smoking cigars. His gastric reflux has been acting up.       Post-Op Info:  Procedure(s) (LRB):  FUSION, SPINE, LUMBAR, TLIF, POSTERIOR APPROACH, USING PEDICLE SCREW (N/A)  CYSTOSCOPY, FLEXIBLE with placement of miranda catheter  FUSION, SPINE, POSTERIOR SPINAL COLUMN, LUMBAR, USING COMPUTER-ASSISTED NAVIGATION  LAMINECTOMY, SPINE, LUMBAR   2 Days Post-Op     Interval History: NAEON. Pain better controlled today. XR shows good position of hardware. No paresthesias. Miranda still in place. Drains with high output. Home with home health pending drain removal     Medications:  Continuous Infusions:  Scheduled Meds:   acetaminophen  1,000 mg Oral Q8H    amLODIPine  10 mg Oral Daily    calcium carbonate  500 mg Oral BID    ceFAZolin (ANCEF) IVPB  2 g Intravenous Q8H    gabapentin  300 mg Oral TID    heparin (porcine)  5,000 Units Subcutaneous Q8H    mupirocin   Nasal BID    polyethylene glycol  17 g Oral Daily    senna-docusate 8.6-50 mg  1 tablet Oral Daily    tiZANidine  4 mg Oral Daily     PRN Meds:aluminum-magnesium hydroxide-simethicone, diazePAM, melatonin, morphine, ondansetron, oxyCODONE, oxyCODONE, prochlorperazine, senna-docusate 8.6-50 mg, sodium chloride 0.9%     Review of Systems  Objective:     Weight: 113.4 kg (250 lb)  Body mass index is 35.87 kg/m².  Vital Signs (Most Recent):  Temp: 97.6 °F (36.4 °C) (04/08/22 0801)  Pulse: 109 (04/08/22 0801)  Resp: 18 (04/08/22 0834)  BP: (!) 148/97 (04/08/22 0801)  SpO2: 96 % (04/08/22 0801)   Vital Signs (24h " "Range):  Temp:  [97.6 °F (36.4 °C)-100 °F (37.8 °C)] 97.6 °F (36.4 °C)  Pulse:  [] 109  Resp:  [18-19] 18  SpO2:  [93 %-99 %] 96 %  BP: (133-148)/(77-99) 148/97                          Closed/Suction Drain 04/06/22 1549 Left Back Accordion 10 Fr. (Active)   Dressing Type Transparent (Tegaderm) 04/07/22 2108   Dressing Status Dry;Intact;Clean 04/07/22 2108   Dressing Intervention Integrity maintained 04/07/22 2108   Drainage Serosanguineous 04/07/22 2108   Status To bulb suction 04/07/22 2108   Output (mL) 110 mL 04/08/22 0600            Closed/Suction Drain 04/06/22 1551 Right Back Accordion 10 Fr. (Active)   Dressing Type Transparent (Tegaderm) 04/07/22 2108   Dressing Status Dry;Clean;Intact 04/07/22 2108   Dressing Intervention Integrity maintained 04/07/22 2108   Drainage Serosanguineous 04/07/22 2108   Status To bulb suction 04/07/22 2108   Output (mL) 20 mL 04/08/22 0600            Urethral Catheter 04/06/22 0915 Latex 16 Fr. (Active)   Site Assessment Clean;Intact 04/07/22 2108   Collection Container Urimeter 04/07/22 2108   Securement Method secured to top of thigh w/ adhesive device 04/07/22 2108   Catheter Care Performed no 04/07/22 2108   Reason for Continuing Urinary Catheterization Placed by Urology Service 04/07/22 2108   CAUTI Prevention Bundle Securement Device in place with 1" slack 04/07/22 2108   Output (mL) 800 mL 04/07/22 1826       Physical Exam    Neurosurgery Physical Exam  General: well developed, well nourished, no distress.   Head: normocephalic, atraumatic  Neurologic: Alert and oriented. Thought content appropriate.  GCS: Motor: 6/Verbal: 5/Eyes: 4 GCS Total: 15  Mental Status: Awake, Alert, Oriented x 4  Language: No aphasia  Speech: No dysarthria  Cranial nerves: face symmetric, tongue midline, CN II-XII grossly intact.   Eyes: pupils equal, round, reactive to light with accommodation, EOMI.   Pulmonary: normal respirations, no signs of respiratory distress  Abdomen: soft, " non-distended, not tender to palpation  Skin: Skin is warm, dry and intact.  Sensory: intact to light touch throughout     Motor Strength:Moves all extremities spontaneously with good tone.   No abnormal movements seen.      Strength   Deltoids Triceps Biceps Wrist Extension Wrist Flexion Hand    Upper: R 5/5 5/5 5/5 5/5 5/5 5/5     L 5/5 5/5 5/5 5/5 5/5 5/5       Iliopsoas Quadriceps Knee  Flexion Tibialis  anterior Gastro- cnemius EHL   Lower: R 5/5 5/5 5/5 5/5 5/5 5/5     L 5/5 5/5 5/5 5/5 5/5 5/5     Incision: Wound vac in place with good seal. 2 HV drains in place to full suction.      Significant Labs:  Recent Labs   Lab 04/06/22 1726 04/07/22  0745 04/08/22  0725   * 107 133*    139 131*   K 4.4 4.0 3.8   * 107 100   CO2 22* 26 25   BUN 7 10 9   CREATININE 0.8 1.1 0.9   CALCIUM 7.4* 8.0* 8.1*     Recent Labs   Lab 04/06/22 1726 04/07/22  0745 04/08/22  0725   WBC 17.03* 12.95* 14.21*   HGB 12.1* 11.0* 10.7*   HCT 36.3* 33.4* 32.6*   * 132* 145*     No results for input(s): LABPT, INR, APTT in the last 48 hours.  Microbiology Results (last 7 days)       ** No results found for the last 168 hours. **          All pertinent labs from the last 24 hours have been reviewed.    Significant Diagnostics:  X-Ray Thoracolumbar Spine AP Lateral    Result Date: 4/8/2022  As above Electronically signed by: Ascencion Cavazos MD Date:    04/08/2022 Time:    07:18       Assessment/Plan:     * Spondylolisthesis of lumbosacral region  Chris Mahmood is a 51 y.o. male with severe stenosis and dynamic instability with L4-L5/L5-S1 spondylolisthesis. Now s/p L4-5, L5-S1 TLIF and L3-iliac fusion on 4/6/22    - Neuro stable  - HV drains in place x 2. Left with 360 cc, Right with 150  cc. Keep in place. Abx while in place  - [place] x 5 days  - Post op H/H stable.   - Post op XR with satisfactory placement of hardware  - Brace to be worn when up and OOB  - Pain control: Better controlled on current regimen.  Continue scheduled tylenol 1 g tid. Oxycodone 5 mg q 4 hours prn moderate pain. Oxycodone 10 mg q 4 hours prn severe pain. Morphine IV for breakthrough pain.   - Little in place per Urology. Keep in place for 3-5 days, will keep for full 5 days if still inpatient.   - PT/OT/OOB. Pt/OT recommending home with home health   - DVT prophylaxis: TEDs/SCDs/SQH  - Bowel regimen: senna and miralax daily     DISPO: pending drain removal         Debbie Franklin PA-C  Neurosurgery  [facility] - Neurosurgery ([facility])

## 2022-04-08 NOTE — ASSESSMENT & PLAN NOTE
Chris Mahmood is a 51 y.o. male with severe stenosis and dynamic instability with L4-L5/L5-S1 spondylolisthesis. Now s/p L4-5, L5-S1 TLIF and L3-iliac fusion on 4/6/22    - Neuro stable  - HV drains in place x 2. Left with 360 cc, Right with 150  cc. Keep in place. Abx while in place  - [place] x 5 days  - Post op H/H stable.   - Post op XR with satisfactory placement of hardware  - Brace to be worn when up and OOB  - Pain control: Better controlled on current regimen. Continue scheduled tylenol 1 g tid. Oxycodone 5 mg q 4 hours prn moderate pain. Oxycodone 10 mg q 4 hours prn severe pain. Morphine IV for breakthrough pain.   - Little in place per Urology. Keep in place for 3-5 days, will keep for full 5 days if still inpatient.   - PT/OT/OOB. Pt/OT recommending home with home health   - DVT prophylaxis: TEDs/SCDs/SQH  - Bowel regimen: senna and miralax daily     DISPO: pending drain removal

## 2022-04-08 NOTE — PHYSICIAN QUERY
PT Name: Chris Mahmood  MR #: [id number]     Documentation Clarification      CDS/:  Christopher Dunn RN, CDS                   Contact Information:  [email or web address]      This form is a permanent document in the medical record.     Query Date: April 8, 2022    By submitting this query, we are merely seeking further clarification of documentation. Please utilize your independent clinical judgment when addressing the question(s) below.    The Medical Record reflects the following:    Supporting Clinical Findings Location in Medical Record   Pre-Operative & Post Operative Diagnosis: Spondylolisthesis, lumbosacral region [M43.17]     OP Note 4/6   Dr. Yadav & Dr. Tiwari   Procedures Performed:    1. Posterior arthrodesis and fusion, L3-pelvis (DePuy Verse and Expedium Poly)   2. Transforaminal lumbar interbody fusion L5/S1 (Spine Wave Leva PX 25mm x 10mm x 10mm expandable)  3. Transforaminal lumbar interbody fusion, L4/5(Spine Wave Leva PX 25mm x 10mm x 13mm expandable)  5.  Decompression of thecal sac and nerve roots via laminectomy, bilateral foraminotomies, and medial facetectomy (left) L4/5, L5-S1   6. Danae osteotomy L3/4, L4/5, L5-S1   7.  Use of autologous bone graft  8.  Use of intraoperative fluoroscopy and CT   9. Use of intraoperative neuro-monitoring with stimulation  10. Use of allograft bone (10 cc Altapore)   11. Use of intraoperative neuronavigation   Op Note 4/6  Dr. Yadav & Dr. Tiwari   I have recommended L3 to pelvic fusion with L4/L5/L5-S1 TLIF.    The patient was brought to the operating room and general endotracheal anesthesia was induced and the appropriate lines placed. TEDs and SCDs were applied. A Little catheter was placed with assistance from urology, which is dictated separately by them. He was carefully positioned prone on the Airo table with all pressure points padded.  The lumbar region was marked, prepped and draped in the usual fashion. AP and lateral X-rays were used  for localization.  A timeout was performed prior to the procedure. Lidocaine with epinephrine was  injected into the skin.     A linear midline incision was made from L3-pelvis using a #10 blade. Dissection was carried down with Bovie electrocautery.  Subperiosteal dissection was carried out with Bovie electrocautery and Richey elevators to expose the posterior elements from L3-pelvis. Our level was confirmed with the Lenke probe. An Airo intraoperative CT was performed.  L3 (7x45), L4 (6x45), L5 (6x45), S1 (6x45 L, 7x35 R) pedicle screws and bilateral iliac bolts (8x80) were placed bilaterally using Brainlab guidance. AP and lateral x-ray confirmed appropriate position of the hardware. The screws were stimulated with the neuro monitoring probe and found to stimulate above a threshold of 25 milliamperes.     Attention was turned to performing decompressive laminectomy and foraminotomies at L3/L4, L4/L5, and L5/S1 using a combination of the Leksell rongeur, Kerrison rongeurs, and curettes.  Danae osteotomies was performed as well.  Flavum was removed with Kerrison rongeurs. The thecal sac was found to be severely compressed.      Attention was turned to performance of a transforaminal lumbar interbody fusion at L5-S1 and then L4/5 from a left-sided approach.  A nerve root retractor was used to retract the thecal sac medially and expose the L4/5 disc space.  Epidural veins were cauterized and divided.  An annulotomy was performed with a 15 blade.  Disc material was removed with the Kerrison rongeur.  The endplates were prepared with disc mariano, rasps, and curettes.  A size 10 trial at L5-S1 and then a size 13 trial at L4-L5 was placed into the disc space and found to fit snugly.  We then placed an expandable titanium cage and confirmed adequate placement with AP/lateral fluoro.  The cage was then back filled with a combination of autologous bone and synthetic bone graft.     Pre-bent titanium rods were then sized  (130mm) and reduced into the tulip heads. Set screws were placed and final tightened. The wound was copiously irrigated with sterile normal saline and a dilute Betadine solution.  The posterior spinal elements and TPs were decorticated bilaterally with a high-speed drill.  Autologous and synthetic bone was placed posteriorly for arthrodesis from L3-S1.  Two deep Hemovacs were placed.  2 g of vancomycin powder was placed in the subfascial space.  A watertight fascial closure was achieved with interrupted 0 Vicryl sutures and a running Stratafix suture.  The soft tissues were closed in layers.  Staples were used to close the skin and a Prevana applied over.       OP Note 4/6 Dr. Tiwari   Pre-bent titanium rods were then sized (130mm) and reduced into the tulip heads. Set screws were placed and final tightened. The wound was copiously irrigated with sterile normal saline and a dilute Betadine solution.  The posterior spinal elements and TPs were decorticated bilaterally with a high-speed drill.  Autologous and synthetic bone was placed posteriorly for arthrodesis from L4-L5.  Two deep Hemovacs were placed.  2 g of vancomycin powder was placed in the subfascial space.  A watertight fascial closure was achieved with interrupted 0 Vicryl sutures and a running Stratafix suture.  The soft tissues were closed in layers.  Staples were used to close the skin and a Prevana applied over.   OP Note 4/6 Dr. Yadav                                                                            Provider, please provide diagnosis or diagnoses associated with above clinical findings.    [ x ] Posterior Fusion from L3- Pelvis     [   ] Posterior Fusion from L4-L5     [   ] Other (please specify): ____________     [   ] Clinically undetermined

## 2022-04-08 NOTE — SUBJECTIVE & OBJECTIVE
Interval History: NAEON. Pain better controlled today. XR shows good position of hardware. No paresthesias. Little still in place. Drains with high output. Home with home health pending drain removal     Medications:  Continuous Infusions:  Scheduled Meds:   acetaminophen  1,000 mg Oral Q8H    amLODIPine  10 mg Oral Daily    calcium carbonate  500 mg Oral BID    ceFAZolin (ANCEF) IVPB  2 g Intravenous Q8H    gabapentin  300 mg Oral TID    heparin (porcine)  5,000 Units Subcutaneous Q8H    mupirocin   Nasal BID    polyethylene glycol  17 g Oral Daily    senna-docusate 8.6-50 mg  1 tablet Oral Daily    tiZANidine  4 mg Oral Daily     PRN Meds:aluminum-magnesium hydroxide-simethicone, diazePAM, melatonin, morphine, ondansetron, oxyCODONE, oxyCODONE, prochlorperazine, senna-docusate 8.6-50 mg, sodium chloride 0.9%     Review of Systems  Objective:     Weight: 113.4 kg (250 lb)  Body mass index is 35.87 kg/m².  Vital Signs (Most Recent):  Temp: 97.6 °F (36.4 °C) (04/08/22 0801)  Pulse: 109 (04/08/22 0801)  Resp: 18 (04/08/22 0834)  BP: (!) 148/97 (04/08/22 0801)  SpO2: 96 % (04/08/22 0801)   Vital Signs (24h Range):  Temp:  [97.6 °F (36.4 °C)-100 °F (37.8 °C)] 97.6 °F (36.4 °C)  Pulse:  [] 109  Resp:  [18-19] 18  SpO2:  [93 %-99 %] 96 %  BP: (133-148)/(77-99) 148/97                          Closed/Suction Drain 04/06/22 1549 Left Back Accordion 10 Fr. (Active)   Dressing Type Transparent (Tegaderm) 04/07/22 2108   Dressing Status Dry;Intact;Clean 04/07/22 2108   Dressing Intervention Integrity maintained 04/07/22 2108   Drainage Serosanguineous 04/07/22 2108   Status To bulb suction 04/07/22 2108   Output (mL) 110 mL 04/08/22 0600            Closed/Suction Drain 04/06/22 1551 Right Back Accordion 10 Fr. (Active)   Dressing Type Transparent (Tegaderm) 04/07/22 2108   Dressing Status Dry;Clean;Intact 04/07/22 2108   Dressing Intervention Integrity maintained 04/07/22 2108   Drainage Serosanguineous 04/07/22 2108  "  Status To bulb suction 04/07/22 2108   Output (mL) 20 mL 04/08/22 0600            Urethral Catheter 04/06/22 0915 Latex 16 Fr. (Active)   Site Assessment Clean;Intact 04/07/22 2108   Collection Container Urimeter 04/07/22 2108   Securement Method secured to top of thigh w/ adhesive device 04/07/22 2108   Catheter Care Performed no 04/07/22 2108   Reason for Continuing Urinary Catheterization Placed by Urology Service 04/07/22 2108   CAUTI Prevention Bundle Securement Device in place with 1" slack 04/07/22 2108   Output (mL) 800 mL 04/07/22 1826       Physical Exam    Neurosurgery Physical Exam  General: well developed, well nourished, no distress.   Head: normocephalic, atraumatic  Neurologic: Alert and oriented. Thought content appropriate.  GCS: Motor: 6/Verbal: 5/Eyes: 4 GCS Total: 15  Mental Status: Awake, Alert, Oriented x 4  Language: No aphasia  Speech: No dysarthria  Cranial nerves: face symmetric, tongue midline, CN II-XII grossly intact.   Eyes: pupils equal, round, reactive to light with accommodation, EOMI.   Pulmonary: normal respirations, no signs of respiratory distress  Abdomen: soft, non-distended, not tender to palpation  Skin: Skin is warm, dry and intact.  Sensory: intact to light touch throughout     Motor Strength:Moves all extremities spontaneously with good tone.   No abnormal movements seen.      Strength   Deltoids Triceps Biceps Wrist Extension Wrist Flexion Hand    Upper: R 5/5 5/5 5/5 5/5 5/5 5/5     L 5/5 5/5 5/5 5/5 5/5 5/5       Iliopsoas Quadriceps Knee  Flexion Tibialis  anterior Gastro- cnemius EHL   Lower: R 5/5 5/5 5/5 5/5 5/5 5/5     L 5/5 5/5 5/5 5/5 5/5 5/5     Incision: Wound vac in place with good seal. 2 HV drains in place to full suction.      Significant Labs:  Recent Labs   Lab 04/06/22  1726 04/07/22  0745 04/08/22  0725   * 107 133*    139 131*   K 4.4 4.0 3.8   * 107 100   CO2 22* 26 25   BUN 7 10 9   CREATININE 0.8 1.1 0.9   CALCIUM 7.4* 8.0* " 8.1*     Recent Labs   Lab 04/06/22  1726 04/07/22  0745 04/08/22  0725   WBC 17.03* 12.95* 14.21*   HGB 12.1* 11.0* 10.7*   HCT 36.3* 33.4* 32.6*   * 132* 145*     No results for input(s): LABPT, INR, APTT in the last 48 hours.  Microbiology Results (last 7 days)       ** No results found for the last 168 hours. **          All pertinent labs from the last 24 hours have been reviewed.    Significant Diagnostics:  X-Ray Thoracolumbar Spine AP Lateral    Result Date: 4/8/2022  As above Electronically signed by: Ascencion Cavazos MD Date:    04/08/2022 Time:    07:18

## 2022-04-09 LAB
ANION GAP SERPL CALC-SCNC: 6 MMOL/L (ref 8–16)
BASOPHILS # BLD AUTO: 0.03 K/UL (ref 0–0.2)
BASOPHILS NFR BLD: 0.3 % (ref 0–1.9)
BUN SERPL-MCNC: 8 MG/DL (ref 6–20)
CALCIUM SERPL-MCNC: 8.4 MG/DL (ref 8.7–10.5)
CHLORIDE SERPL-SCNC: 99 MMOL/L (ref 95–110)
CO2 SERPL-SCNC: 26 MMOL/L (ref 23–29)
CREAT SERPL-MCNC: 0.8 MG/DL (ref 0.5–1.4)
DIFFERENTIAL METHOD: ABNORMAL
EOSINOPHIL # BLD AUTO: 0.2 K/UL (ref 0–0.5)
EOSINOPHIL NFR BLD: 1.5 % (ref 0–8)
ERYTHROCYTE [DISTWIDTH] IN BLOOD BY AUTOMATED COUNT: 13.6 % (ref 11.5–14.5)
EST. GFR  (AFRICAN AMERICAN): >60 ML/MIN/1.73 M^2
EST. GFR  (NON AFRICAN AMERICAN): >60 ML/MIN/1.73 M^2
GLUCOSE SERPL-MCNC: 103 MG/DL (ref 70–110)
HCT VFR BLD AUTO: 28.2 % (ref 40–54)
HGB BLD-MCNC: 9.5 G/DL (ref 14–18)
IMM GRANULOCYTES # BLD AUTO: 0.07 K/UL (ref 0–0.04)
IMM GRANULOCYTES NFR BLD AUTO: 0.6 % (ref 0–0.5)
LYMPHOCYTES # BLD AUTO: 1.9 K/UL (ref 1–4.8)
LYMPHOCYTES NFR BLD: 16.1 % (ref 18–48)
MCH RBC QN AUTO: 32.3 PG (ref 27–31)
MCHC RBC AUTO-ENTMCNC: 33.7 G/DL (ref 32–36)
MCV RBC AUTO: 96 FL (ref 82–98)
MONOCYTES # BLD AUTO: 1.7 K/UL (ref 0.3–1)
MONOCYTES NFR BLD: 13.8 % (ref 4–15)
NEUTROPHILS # BLD AUTO: 8.1 K/UL (ref 1.8–7.7)
NEUTROPHILS NFR BLD: 67.7 % (ref 38–73)
NRBC BLD-RTO: 0 /100 WBC
PLATELET # BLD AUTO: 163 K/UL (ref 150–450)
PMV BLD AUTO: 10.7 FL (ref 9.2–12.9)
POTASSIUM SERPL-SCNC: 3.7 MMOL/L (ref 3.5–5.1)
RBC # BLD AUTO: 2.94 M/UL (ref 4.6–6.2)
SODIUM SERPL-SCNC: 131 MMOL/L (ref 136–145)
WBC # BLD AUTO: 11.98 K/UL (ref 3.9–12.7)

## 2022-04-09 PROCEDURE — 99024 PR POST-OP FOLLOW-UP VISIT: ICD-10-PCS | Mod: ,,, | Performed by: PHYSICIAN ASSISTANT

## 2022-04-09 PROCEDURE — 51702 INSERT TEMP BLADDER CATH: CPT

## 2022-04-09 PROCEDURE — 99024 POSTOP FOLLOW-UP VISIT: CPT | Mod: ,,, | Performed by: PHYSICIAN ASSISTANT

## 2022-04-09 PROCEDURE — 94799 UNLISTED PULMONARY SVC/PX: CPT

## 2022-04-09 PROCEDURE — 25000003 PHARM REV CODE 250: Performed by: PHYSICIAN ASSISTANT

## 2022-04-09 PROCEDURE — 11000001 HC ACUTE MED/SURG PRIVATE ROOM

## 2022-04-09 PROCEDURE — 25000003 PHARM REV CODE 250: Performed by: STUDENT IN AN ORGANIZED HEALTH CARE EDUCATION/TRAINING PROGRAM

## 2022-04-09 PROCEDURE — 94761 N-INVAS EAR/PLS OXIMETRY MLT: CPT

## 2022-04-09 PROCEDURE — 80048 BASIC METABOLIC PNL TOTAL CA: CPT | Performed by: STUDENT IN AN ORGANIZED HEALTH CARE EDUCATION/TRAINING PROGRAM

## 2022-04-09 PROCEDURE — 63600175 PHARM REV CODE 636 W HCPCS: Performed by: PHYSICIAN ASSISTANT

## 2022-04-09 PROCEDURE — 36415 COLL VENOUS BLD VENIPUNCTURE: CPT | Performed by: STUDENT IN AN ORGANIZED HEALTH CARE EDUCATION/TRAINING PROGRAM

## 2022-04-09 PROCEDURE — 85025 COMPLETE CBC W/AUTO DIFF WBC: CPT | Performed by: STUDENT IN AN ORGANIZED HEALTH CARE EDUCATION/TRAINING PROGRAM

## 2022-04-09 PROCEDURE — 63600175 PHARM REV CODE 636 W HCPCS: Performed by: STUDENT IN AN ORGANIZED HEALTH CARE EDUCATION/TRAINING PROGRAM

## 2022-04-09 RX ORDER — LACTULOSE 10 G/15ML
10 SOLUTION ORAL 3 TIMES DAILY
Status: DISCONTINUED | OUTPATIENT
Start: 2022-04-09 | End: 2022-04-11 | Stop reason: HOSPADM

## 2022-04-09 RX ORDER — GLYCERIN 1 G/1
1 SUPPOSITORY RECTAL DAILY PRN
Status: DISCONTINUED | OUTPATIENT
Start: 2022-04-09 | End: 2022-04-11 | Stop reason: HOSPADM

## 2022-04-09 RX ADMIN — TIZANIDINE 4 MG: 4 TABLET ORAL at 08:04

## 2022-04-09 RX ADMIN — SODIUM CHLORIDE TAB 1 GM 1 G: 1 TAB at 04:04

## 2022-04-09 RX ADMIN — ACETAMINOPHEN 1000 MG: 500 TABLET ORAL at 12:04

## 2022-04-09 RX ADMIN — GABAPENTIN 300 MG: 300 CAPSULE ORAL at 03:04

## 2022-04-09 RX ADMIN — AMLODIPINE BESYLATE 10 MG: 10 TABLET ORAL at 08:04

## 2022-04-09 RX ADMIN — OXYCODONE HYDROCHLORIDE 10 MG: 10 TABLET ORAL at 05:04

## 2022-04-09 RX ADMIN — MORPHINE SULFATE 2 MG: 2 INJECTION, SOLUTION INTRAMUSCULAR; INTRAVENOUS at 10:04

## 2022-04-09 RX ADMIN — CALCIUM CARBONATE (ANTACID) CHEW TAB 500 MG 500 MG: 500 CHEW TAB at 08:04

## 2022-04-09 RX ADMIN — SODIUM CHLORIDE TAB 1 GM 1 G: 1 TAB at 12:04

## 2022-04-09 RX ADMIN — HEPARIN SODIUM 5000 UNITS: 5000 INJECTION INTRAVENOUS; SUBCUTANEOUS at 05:04

## 2022-04-09 RX ADMIN — SENNOSIDES AND DOCUSATE SODIUM 2 TABLET: 50; 8.6 TABLET ORAL at 08:04

## 2022-04-09 RX ADMIN — Medication 2 G: at 05:04

## 2022-04-09 RX ADMIN — OXYCODONE HYDROCHLORIDE 10 MG: 10 TABLET ORAL at 09:04

## 2022-04-09 RX ADMIN — HEPARIN SODIUM 5000 UNITS: 5000 INJECTION INTRAVENOUS; SUBCUTANEOUS at 08:04

## 2022-04-09 RX ADMIN — HEPARIN SODIUM 5000 UNITS: 5000 INJECTION INTRAVENOUS; SUBCUTANEOUS at 03:04

## 2022-04-09 RX ADMIN — GABAPENTIN 300 MG: 300 CAPSULE ORAL at 08:04

## 2022-04-09 RX ADMIN — POLYETHYLENE GLYCOL 3350 17 G: 17 POWDER, FOR SOLUTION ORAL at 08:04

## 2022-04-09 RX ADMIN — MUPIROCIN: 20 OINTMENT TOPICAL at 09:04

## 2022-04-09 RX ADMIN — OXYCODONE HYDROCHLORIDE 10 MG: 10 TABLET ORAL at 12:04

## 2022-04-09 RX ADMIN — SODIUM CHLORIDE TAB 1 GM 1 G: 1 TAB at 08:04

## 2022-04-09 RX ADMIN — ACETAMINOPHEN 1000 MG: 500 TABLET ORAL at 05:04

## 2022-04-09 RX ADMIN — Medication 2 G: at 03:04

## 2022-04-09 RX ADMIN — Medication 2 G: at 09:04

## 2022-04-09 RX ADMIN — DIAZEPAM 5 MG: 5 TABLET ORAL at 11:04

## 2022-04-09 RX ADMIN — DIAZEPAM 5 MG: 5 TABLET ORAL at 05:04

## 2022-04-09 RX ADMIN — OXYCODONE HYDROCHLORIDE 10 MG: 10 TABLET ORAL at 01:04

## 2022-04-09 RX ADMIN — OXYCODONE HYDROCHLORIDE 10 MG: 10 TABLET ORAL at 08:04

## 2022-04-09 NOTE — PROGRESS NOTES
"[facility] - Neurosurgery ([facility])  Neurosurgery  Progress Note    Subjective:     History of Present Illness: "Chris Mahmood is a 50 y.o. male with PMH of GERD, current smoker, and HTN. He is being seen in clinic today to discuss his concerns with progressive low back and bilateral leg pain. States that he has always struggled with his back due to his profession as a ; however, in 2015 the pain became so severe that it started to affect his ability to perform his work duties. He was recently laid off from his job as a result of the COVID pandemic. Denies trauma.      "Describes the pain as constant, aching, and stabbing across his low back bilaterally with radiation in the posterior aspect of his thighs L > right. States that intermittently with ambulation he experiences sharp shooting pain in anterior and lateral aspects of his legs primarily from the knees down. Rates the back pain as a 10/10 and the left leg as a 8/10 and the right leg as a 6/10. Aggravating factors include walking, prolonged standing, and lying down. Alleviating factors include sitting and bending forward to stretch. He ambulates without the use of assistive devices. Denies b/b dysfunction, saddle anesthesia, or gait instability. Reports weakness in the legs L > R as a result of the pain; as well as numbness and tingling primarily in the L5/S1 dermatome on the left when the pain is severe. The patient attempted PT, but was unable to continue the sessions due to the pain. He has an appointment later this month with pain management to discuss ANA."      As of 6/24/21, the patient reports that he sees pain management tomorrow for discussion of ANA. He has severe, debilitating pain in his left leg 99% of the time. His symptoms are now sever even when sitting down. He does get some relief with bending over, as over a shopping cart.       He denies bleeding, infectious, or anesthetic complications from his previous elbow " "surgery. He smokes 2-3 cigars daily, and he drinks a little moonshine.      As of 2/17/22, the patient reports that he has been getting ANA and RFA, which have been helpful, but the pain is "coming back with a vengeance." He states that he is unable to keep living with the current level of pain he's experiencing.      Both legs hurt, but the left leg has worse shooting pain. This is different than when he got his previous MRI.      He is still smoking cigars. His gastric reflux has been acting up.       Post-Op Info:  Procedure(s) (LRB):  FUSION, SPINE, LUMBAR, TLIF, POSTERIOR APPROACH, USING PEDICLE SCREW (N/A)  CYSTOSCOPY, FLEXIBLE with placement of miranda catheter  FUSION, SPINE, POSTERIOR SPINAL COLUMN, LUMBAR, USING COMPUTER-ASSISTED NAVIGATION  LAMINECTOMY, SPINE, LUMBAR   3 Days Post-Op     Interval History: NAEON. Pain continues to be controlled. WV not holding good seal, removed without complication. L drain with 40 cc output, removed without complication. Right drain in place for 150 cc output. Neuro exam stable. Lactulose added for constipation as well as prn suppository. Plan for miranda removal early Monday AM.   Medications:  Continuous Infusions:  Scheduled Meds:   acetaminophen  1,000 mg Oral Q8H    amLODIPine  10 mg Oral Daily    ceFAZolin (ANCEF) IVPB  2 g Intravenous Q8H    gabapentin  300 mg Oral TID    heparin (porcine)  5,000 Units Subcutaneous Q8H    lactulose  10 g Oral TID    mupirocin   Nasal BID    polyethylene glycol  17 g Oral Daily    senna-docusate 8.6-50 mg  1 tablet Oral Daily    sodium chloride  1 g Oral TID    tiZANidine  4 mg Oral Daily     PRN Meds:aluminum-magnesium hydroxide-simethicone, diazePAM, glycerin adult, melatonin, morphine, ondansetron, oxyCODONE, oxyCODONE, prochlorperazine, senna-docusate 8.6-50 mg, sodium chloride 0.9%     Review of Systems  Objective:     Weight: 113.4 kg (250 lb)  Body mass index is 35.87 kg/m².  Vital Signs (Most Recent):  Temp: 99.3 °F " "(37.4 °C) (04/09/22 0824)  Pulse: 94 (04/09/22 0824)  Resp: 18 (04/09/22 0849)  BP: 128/76 (04/09/22 0824)  SpO2: 95 % (04/09/22 0824) Vital Signs (24h Range):  Temp:  [98 °F (36.7 °C)-99.3 °F (37.4 °C)] 99.3 °F (37.4 °C)  Pulse:  [] 94  Resp:  [18-19] 18  SpO2:  [94 %-98 %] 95 %  BP: (128-163)/(68-98) 128/76                          Closed/Suction Drain 04/06/22 1549 Left Back Accordion 10 Fr. (Active)   Dressing Type Transparent (Tegaderm) 04/08/22 2133   Dressing Status Clean;Intact;Dry 04/08/22 2133   Dressing Intervention Integrity maintained 04/08/22 2133   Drainage Serosanguineous 04/08/22 2133   Status To bulb suction 04/08/22 2133   Output (mL) 0 mL 04/09/22 0528            Closed/Suction Drain 04/06/22 1551 Right Back Accordion 10 Fr. (Active)   Dressing Type Transparent (Tegaderm) 04/08/22 2133   Dressing Status Intact;Dry;Clean 04/08/22 2133   Dressing Intervention Integrity maintained 04/08/22 2133   Drainage Serosanguineous 04/08/22 2133   Status To bulb suction 04/08/22 2133   Output (mL) 40 mL 04/09/22 0528            Urethral Catheter 04/06/22 0915 Latex 16 Fr. (Active)   $ Little Insertion Bedside Insertion Performed 04/09/22 0800   Site Assessment Clean;Intact;Dry 04/09/22 0800   Collection Container Urimeter 04/09/22 0800   Securement Method secured to top of thigh w/ adhesive device 04/09/22 0800   Catheter Care Performed no 04/09/22 0800   Reason for Continuing Urinary Catheterization Placed by Urology Service 04/09/22 0800   CAUTI Prevention Bundle Securement Device in place with 1" slack;Intact seal between catheter & drainage tubing;Drainage bag/urimeter off the floor;Sheeting clip in use;No dependent loops or kinks;Drainage bag/urimeter not overfilled (<2/3 full);Drainage bag/urimeter below bladder 04/09/22 0800   Output (mL) 980 mL 04/08/22 1855       Physical Exam    Neurosurgery Physical Exam  General: well developed, well nourished, no distress.   Head: normocephalic, " atraumatic  Neurologic: Alert and oriented. Thought content appropriate.  GCS: Motor: 6/Verbal: 5/Eyes: 4 GCS Total: 15  Mental Status: Awake, Alert, Oriented x 4  Language: No aphasia  Speech: No dysarthria  Cranial nerves: face symmetric, tongue midline, CN II-XII grossly intact.   Eyes: pupils equal, round, reactive to light with accommodation, EOMI.   Pulmonary: normal respirations, no signs of respiratory distress  Abdomen: soft, non-distended, not tender to palpation  Skin: Skin is warm, dry and intact.  Sensory: intact to light touch throughout     Motor Strength:Moves all extremities spontaneously with good tone.   No abnormal movements seen.      Strength   Deltoids Triceps Biceps Wrist Extension Wrist Flexion Hand    Upper: R 5/5 5/5 5/5 5/5 5/5 5/5     L 5/5 5/5 5/5 5/5 5/5 5/5       Iliopsoas Quadriceps Knee  Flexion Tibialis  anterior Gastro- cnemius EHL   Lower: R 5/5 5/5 5/5 5/5 5/5 5/5     L 5/5 5/5 5/5 5/5 5/5 5/5      Incision: Clean, dry, staples intact. Skin edges well approximated. No surrounding erythema or edema. No drainage or TTP. Right HV drain in place to suction.        Significant Labs:  Recent Labs   Lab 04/08/22  0725 04/09/22  0700   * 103   * 131*   K 3.8 3.7    99   CO2 25 26   BUN 9 8   CREATININE 0.9 0.8   CALCIUM 8.1* 8.4*     Recent Labs   Lab 04/08/22  0725 04/09/22  0700   WBC 14.21* 11.98   HGB 10.7* 9.5*   HCT 32.6* 28.2*   * 163     No results for input(s): LABPT, INR, APTT in the last 48 hours.  Microbiology Results (last 7 days)       ** No results found for the last 168 hours. **          All pertinent labs from the last 24 hours have been reviewed.    Significant Diagnostics:  No results found in the last 24 hours.      Assessment/Plan:     * Spondylolisthesis of lumbosacral region  Chris Mahmood is a 51 y.o. male with severe stenosis and dynamic instability with L4-L5/L5-S1 spondylolisthesis. Now s/p L4-5, L5-S1 TLIF and L3-iliac fusion  on 4/6/22    - Neuro stable  - Left HV drain removed without complication. Right drain in place with 150 cc output. Abx while in place  - WV not holding seal, removed on POD3 without complication  - Post op H/H stable.   - Post op XR with satisfactory placement of hardware  - Brace to be worn when up and OOB  - Pain control: Better controlled on current regimen. Continue scheduled tylenol 1 g tid. Oxycodone 5 mg q 4 hours prn moderate pain. Oxycodone 10 mg q 4 hours prn severe pain. Morphine IV for breakthrough pain.   - Hyponatremia: 131, Nacl tabs 1 g tid x 1 day. Follow up Na tomorrow.   - Little in place per Urology. Keep in place for 3-5 days, plan to remove Monday AM prior to discharge.   - PT/OT/OOB. Pt/OT recommending outpatient therapy   - DVT prophylaxis: TEDs/SCDs/SQH  - Bowel regimen: No BM post op. senna and miralax daily. Lactulose added for constipation. Suppository prn.     DISPO: pending drain removal, most likely Monday.     Discussed with WILMAR LeungC  Neurosurgery  [facility] - Neuro[facility] ([facility])

## 2022-04-09 NOTE — PLAN OF CARE
Problem: Adult Inpatient Plan of Care  Goal: Plan of Care Review  Outcome: Ongoing, Progressing  Goal: Patient-Specific Goal (Individualized)  Outcome: Ongoing, Progressing  Goal: Optimal Comfort and Wellbeing  Outcome: Ongoing, Progressing     Problem: Infection  Goal: Absence of Infection Signs and Symptoms  Outcome: Ongoing, Progressing    POC reviewed and updated with the patient. Questions regarding POC were encouraged and addressed with the patient.  VSS, see flow-sheets. Patient is AO X * 4 at this time. Fall/safety precautions maintained, no signs of injury noted during shift. Pt has woundvac and  hemovac x 2 in place. Little is to stay in place for 3-5 days. Pain management is goal. Patient instructed to call staff for mobility, verbalized understanding. Plans to discharge with home with home health. No acute signs of distress noted at this time.

## 2022-04-09 NOTE — ASSESSMENT & PLAN NOTE
Chris Mahmood is a 51 y.o. male with severe stenosis and dynamic instability with L4-L5/L5-S1 spondylolisthesis. Now s/p L4-5, L5-S1 TLIF and L3-iliac fusion on 4/6/22    - Neuro stable  - Left HV drain removed without complication. Right drain in place with 150 cc output. Abx while in place  - WV not holding seal, removed on POD3 without complication  - Post op H/H stable.   - Post op XR with satisfactory placement of hardware  - Brace to be worn when up and OOB  - Pain control: Better controlled on current regimen. Continue scheduled tylenol 1 g tid. Oxycodone 5 mg q 4 hours prn moderate pain. Oxycodone 10 mg q 4 hours prn severe pain. Morphine IV for breakthrough pain.   - Hyponatremia: 131, Nacl tabs 1 g tid x 1 day. Follow up Na tomorrow.   - Little in place per Urology. Keep in place for 3-5 days, plan to remove Monday AM prior to discharge.   - PT/OT/OOB. Pt/OT recommending outpatient therapy   - DVT prophylaxis: TEDs/SCDs/SQH  - Bowel regimen: No BM post op. senna and miralax daily. Lactulose added for constipation. Suppository prn.     DISPO: pending drain removal, most likely Monday.     Discussed with Dr. Tiwari

## 2022-04-09 NOTE — SUBJECTIVE & OBJECTIVE
Interval History: NAEON. Pain continues to be controlled. WV not holding good seal, removed without complication. L drain with 40 cc output, removed without complication. Right drain in place for 150 cc output. Neuro exam stable. Lactulose added for constipation as well as prn suppository. Plan for miranda removal early Monday AM.   Medications:  Continuous Infusions:  Scheduled Meds:   acetaminophen  1,000 mg Oral Q8H    amLODIPine  10 mg Oral Daily    ceFAZolin (ANCEF) IVPB  2 g Intravenous Q8H    gabapentin  300 mg Oral TID    heparin (porcine)  5,000 Units Subcutaneous Q8H    lactulose  10 g Oral TID    mupirocin   Nasal BID    polyethylene glycol  17 g Oral Daily    senna-docusate 8.6-50 mg  1 tablet Oral Daily    sodium chloride  1 g Oral TID    tiZANidine  4 mg Oral Daily     PRN Meds:aluminum-magnesium hydroxide-simethicone, diazePAM, glycerin adult, melatonin, morphine, ondansetron, oxyCODONE, oxyCODONE, prochlorperazine, senna-docusate 8.6-50 mg, sodium chloride 0.9%     Review of Systems  Objective:     Weight: 113.4 kg (250 lb)  Body mass index is 35.87 kg/m².  Vital Signs (Most Recent):  Temp: 99.3 °F (37.4 °C) (04/09/22 0824)  Pulse: 94 (04/09/22 0824)  Resp: 18 (04/09/22 0849)  BP: 128/76 (04/09/22 0824)  SpO2: 95 % (04/09/22 0824) Vital Signs (24h Range):  Temp:  [98 °F (36.7 °C)-99.3 °F (37.4 °C)] 99.3 °F (37.4 °C)  Pulse:  [] 94  Resp:  [18-19] 18  SpO2:  [94 %-98 %] 95 %  BP: (128-163)/(68-98) 128/76                          Closed/Suction Drain 04/06/22 1549 Left Back Accordion 10 Fr. (Active)   Dressing Type Transparent (Tegaderm) 04/08/22 2133   Dressing Status Clean;Intact;Dry 04/08/22 2133   Dressing Intervention Integrity maintained 04/08/22 2133   Drainage Serosanguineous 04/08/22 2133   Status To bulb suction 04/08/22 2133   Output (mL) 0 mL 04/09/22 0528            Closed/Suction Drain 04/06/22 1551 Right Back Accordion 10 Fr. (Active)   Dressing Type Transparent (Tegaderm) 04/08/22  "2133   Dressing Status Intact;Dry;Clean 04/08/22 2133   Dressing Intervention Integrity maintained 04/08/22 2133   Drainage Serosanguineous 04/08/22 2133   Status To bulb suction 04/08/22 2133   Output (mL) 40 mL 04/09/22 0528            Urethral Catheter 04/06/22 0915 Latex 16 Fr. (Active)   $ Little Insertion Bedside Insertion Performed 04/09/22 0800   Site Assessment Clean;Intact;Dry 04/09/22 0800   Collection Container Urimeter 04/09/22 0800   Securement Method secured to top of thigh w/ adhesive device 04/09/22 0800   Catheter Care Performed no 04/09/22 0800   Reason for Continuing Urinary Catheterization Placed by Urology Service 04/09/22 0800   CAUTI Prevention Bundle Securement Device in place with 1" slack;Intact seal between catheter & drainage tubing;Drainage bag/urimeter off the floor;Sheeting clip in use;No dependent loops or kinks;Drainage bag/urimeter not overfilled (<2/3 full);Drainage bag/urimeter below bladder 04/09/22 0800   Output (mL) 980 mL 04/08/22 1855       Physical Exam    Neurosurgery Physical Exam  General: well developed, well nourished, no distress.   Head: normocephalic, atraumatic  Neurologic: Alert and oriented. Thought content appropriate.  GCS: Motor: 6/Verbal: 5/Eyes: 4 GCS Total: 15  Mental Status: Awake, Alert, Oriented x 4  Language: No aphasia  Speech: No dysarthria  Cranial nerves: face symmetric, tongue midline, CN II-XII grossly intact.   Eyes: pupils equal, round, reactive to light with accommodation, EOMI.   Pulmonary: normal respirations, no signs of respiratory distress  Abdomen: soft, non-distended, not tender to palpation  Skin: Skin is warm, dry and intact.  Sensory: intact to light touch throughout     Motor Strength:Moves all extremities spontaneously with good tone.   No abnormal movements seen.      Strength   Deltoids Triceps Biceps Wrist Extension Wrist Flexion Hand    Upper: R 5/5 5/5 5/5 5/5 5/5 5/5     L 5/5 5/5 5/5 5/5 5/5 5/5       Iliopsoas Quadriceps " Knee  Flexion Tibialis  anterior Gastro- cnemius EHL   Lower: R 5/5 5/5 5/5 5/5 5/5 5/5     L 5/5 5/5 5/5 5/5 5/5 5/5      Incision: Clean, dry, staples intact. Skin edges well approximated. No surrounding erythema or edema. No drainage or TTP. Right HV drain in place to suction.        Significant Labs:  Recent Labs   Lab 04/08/22  0725 04/09/22  0700   * 103   * 131*   K 3.8 3.7    99   CO2 25 26   BUN 9 8   CREATININE 0.9 0.8   CALCIUM 8.1* 8.4*     Recent Labs   Lab 04/08/22  0725 04/09/22  0700   WBC 14.21* 11.98   HGB 10.7* 9.5*   HCT 32.6* 28.2*   * 163     No results for input(s): LABPT, INR, APTT in the last 48 hours.  Microbiology Results (last 7 days)       ** No results found for the last 168 hours. **          All pertinent labs from the last 24 hours have been reviewed.    Significant Diagnostics:  No results found in the last 24 hours.

## 2022-04-10 LAB
ANION GAP SERPL CALC-SCNC: 10 MMOL/L (ref 8–16)
BASOPHILS # BLD AUTO: 0.04 K/UL (ref 0–0.2)
BASOPHILS NFR BLD: 0.4 % (ref 0–1.9)
BLD PROD TYP BPU: NORMAL
BLD PROD TYP BPU: NORMAL
BLOOD UNIT EXPIRATION DATE: NORMAL
BLOOD UNIT EXPIRATION DATE: NORMAL
BLOOD UNIT TYPE CODE: 9500
BLOOD UNIT TYPE CODE: 9500
BLOOD UNIT TYPE: NORMAL
BLOOD UNIT TYPE: NORMAL
BUN SERPL-MCNC: 7 MG/DL (ref 6–20)
CALCIUM SERPL-MCNC: 8.2 MG/DL (ref 8.7–10.5)
CHLORIDE SERPL-SCNC: 100 MMOL/L (ref 95–110)
CO2 SERPL-SCNC: 25 MMOL/L (ref 23–29)
CODING SYSTEM: NORMAL
CODING SYSTEM: NORMAL
CREAT SERPL-MCNC: 0.9 MG/DL (ref 0.5–1.4)
DIFFERENTIAL METHOD: ABNORMAL
DISPENSE STATUS: NORMAL
DISPENSE STATUS: NORMAL
EOSINOPHIL # BLD AUTO: 0.3 K/UL (ref 0–0.5)
EOSINOPHIL NFR BLD: 2.5 % (ref 0–8)
ERYTHROCYTE [DISTWIDTH] IN BLOOD BY AUTOMATED COUNT: 13.3 % (ref 11.5–14.5)
EST. GFR  (AFRICAN AMERICAN): >60 ML/MIN/1.73 M^2
EST. GFR  (NON AFRICAN AMERICAN): >60 ML/MIN/1.73 M^2
GLUCOSE SERPL-MCNC: 100 MG/DL (ref 70–110)
HCT VFR BLD AUTO: 27.4 % (ref 40–54)
HGB BLD-MCNC: 9 G/DL (ref 14–18)
IMM GRANULOCYTES # BLD AUTO: 0.06 K/UL (ref 0–0.04)
IMM GRANULOCYTES NFR BLD AUTO: 0.5 % (ref 0–0.5)
LYMPHOCYTES # BLD AUTO: 2.4 K/UL (ref 1–4.8)
LYMPHOCYTES NFR BLD: 21.5 % (ref 18–48)
MCH RBC QN AUTO: 31.5 PG (ref 27–31)
MCHC RBC AUTO-ENTMCNC: 32.8 G/DL (ref 32–36)
MCV RBC AUTO: 96 FL (ref 82–98)
MONOCYTES # BLD AUTO: 1.5 K/UL (ref 0.3–1)
MONOCYTES NFR BLD: 13 % (ref 4–15)
NEUTROPHILS # BLD AUTO: 7 K/UL (ref 1.8–7.7)
NEUTROPHILS NFR BLD: 62.1 % (ref 38–73)
NRBC BLD-RTO: 0 /100 WBC
PLATELET # BLD AUTO: 229 K/UL (ref 150–450)
PMV BLD AUTO: 10.4 FL (ref 9.2–12.9)
POTASSIUM SERPL-SCNC: 4 MMOL/L (ref 3.5–5.1)
RBC # BLD AUTO: 2.86 M/UL (ref 4.6–6.2)
SODIUM SERPL-SCNC: 135 MMOL/L (ref 136–145)
TRANS ERYTHROCYTES VOL PATIENT: NORMAL ML
TRANS ERYTHROCYTES VOL PATIENT: NORMAL ML
WBC # BLD AUTO: 11.2 K/UL (ref 3.9–12.7)

## 2022-04-10 PROCEDURE — 94761 N-INVAS EAR/PLS OXIMETRY MLT: CPT

## 2022-04-10 PROCEDURE — 25000003 PHARM REV CODE 250: Performed by: STUDENT IN AN ORGANIZED HEALTH CARE EDUCATION/TRAINING PROGRAM

## 2022-04-10 PROCEDURE — 99024 PR POST-OP FOLLOW-UP VISIT: ICD-10-PCS | Mod: ,,, | Performed by: NEUROLOGICAL SURGERY

## 2022-04-10 PROCEDURE — 80048 BASIC METABOLIC PNL TOTAL CA: CPT | Performed by: STUDENT IN AN ORGANIZED HEALTH CARE EDUCATION/TRAINING PROGRAM

## 2022-04-10 PROCEDURE — 25000003 PHARM REV CODE 250: Performed by: PHYSICIAN ASSISTANT

## 2022-04-10 PROCEDURE — 99024 POSTOP FOLLOW-UP VISIT: CPT | Mod: ,,, | Performed by: NEUROLOGICAL SURGERY

## 2022-04-10 PROCEDURE — 36415 COLL VENOUS BLD VENIPUNCTURE: CPT | Performed by: STUDENT IN AN ORGANIZED HEALTH CARE EDUCATION/TRAINING PROGRAM

## 2022-04-10 PROCEDURE — 85025 COMPLETE CBC W/AUTO DIFF WBC: CPT | Performed by: STUDENT IN AN ORGANIZED HEALTH CARE EDUCATION/TRAINING PROGRAM

## 2022-04-10 PROCEDURE — 63600175 PHARM REV CODE 636 W HCPCS: Performed by: STUDENT IN AN ORGANIZED HEALTH CARE EDUCATION/TRAINING PROGRAM

## 2022-04-10 PROCEDURE — 63600175 PHARM REV CODE 636 W HCPCS: Performed by: PHYSICIAN ASSISTANT

## 2022-04-10 PROCEDURE — 11000001 HC ACUTE MED/SURG PRIVATE ROOM

## 2022-04-10 RX ADMIN — HEPARIN SODIUM 5000 UNITS: 5000 INJECTION INTRAVENOUS; SUBCUTANEOUS at 05:04

## 2022-04-10 RX ADMIN — OXYCODONE HYDROCHLORIDE 10 MG: 10 TABLET ORAL at 11:04

## 2022-04-10 RX ADMIN — OXYCODONE HYDROCHLORIDE 10 MG: 10 TABLET ORAL at 02:04

## 2022-04-10 RX ADMIN — TIZANIDINE 4 MG: 4 TABLET ORAL at 09:04

## 2022-04-10 RX ADMIN — HEPARIN SODIUM 5000 UNITS: 5000 INJECTION INTRAVENOUS; SUBCUTANEOUS at 03:04

## 2022-04-10 RX ADMIN — Medication 2 G: at 09:04

## 2022-04-10 RX ADMIN — LACTULOSE 10 G: 20 SOLUTION ORAL at 09:04

## 2022-04-10 RX ADMIN — MORPHINE SULFATE 2 MG: 2 INJECTION, SOLUTION INTRAMUSCULAR; INTRAVENOUS at 04:04

## 2022-04-10 RX ADMIN — AMLODIPINE BESYLATE 10 MG: 10 TABLET ORAL at 07:04

## 2022-04-10 RX ADMIN — ACETAMINOPHEN 1000 MG: 500 TABLET ORAL at 05:04

## 2022-04-10 RX ADMIN — GABAPENTIN 300 MG: 300 CAPSULE ORAL at 09:04

## 2022-04-10 RX ADMIN — GABAPENTIN 300 MG: 300 CAPSULE ORAL at 03:04

## 2022-04-10 RX ADMIN — Medication 2 G: at 05:04

## 2022-04-10 RX ADMIN — HEPARIN SODIUM 5000 UNITS: 5000 INJECTION INTRAVENOUS; SUBCUTANEOUS at 09:04

## 2022-04-10 RX ADMIN — ACETAMINOPHEN 1000 MG: 500 TABLET ORAL at 09:04

## 2022-04-10 RX ADMIN — GABAPENTIN 300 MG: 300 CAPSULE ORAL at 07:04

## 2022-04-10 RX ADMIN — Medication 2 G: at 03:04

## 2022-04-10 RX ADMIN — OXYCODONE HYDROCHLORIDE 10 MG: 10 TABLET ORAL at 04:04

## 2022-04-10 RX ADMIN — ACETAMINOPHEN 1000 MG: 500 TABLET ORAL at 03:04

## 2022-04-10 RX ADMIN — SENNOSIDES AND DOCUSATE SODIUM 1 TABLET: 50; 8.6 TABLET ORAL at 07:04

## 2022-04-10 RX ADMIN — MUPIROCIN: 20 OINTMENT TOPICAL at 09:04

## 2022-04-10 RX ADMIN — POLYETHYLENE GLYCOL 3350 17 G: 17 POWDER, FOR SOLUTION ORAL at 07:04

## 2022-04-10 RX ADMIN — DIAZEPAM 5 MG: 5 TABLET ORAL at 04:04

## 2022-04-10 RX ADMIN — OXYCODONE HYDROCHLORIDE 10 MG: 10 TABLET ORAL at 09:04

## 2022-04-10 RX ADMIN — OXYCODONE HYDROCHLORIDE 10 MG: 10 TABLET ORAL at 07:04

## 2022-04-10 NOTE — ASSESSMENT & PLAN NOTE
Chris Mahmood is a 51 y.o. male with severe stenosis and dynamic instability with L4-L5/L5-S1 spondylolisthesis. Now s/p L4-5, L5-S1 TLIF and L3-iliac fusion on 4/6/22      - Neuro stable  - Left HV drain removed.  One drain still left in place with minimal output will check final output.  Possible removal today  - Post op H/H stablr  - Post op XR with satisfactory placement of hardware  - Brace to be worn when up and OOB  - Pain control: Better controlled on current regimen. Continue scheduled tylenol 1 g tid. Oxycodone 5 mg q 4 hours prn moderate pain. Oxycodone 10 mg q 4 hours prn severe pain. Morphine IV for breakthrough pain.   -- Little in place per Urology. Keep in place for 3-5 days, will DC today and follow-up voiding trial  - PT/OT/OOB. Pt/OT recommending outpatient therapy   - DVT prophylaxis: TEDs/SCDs/SQH  - Bowel regimen: No BM post op. senna and miralax daily. Lactulose added for constipation. Suppository prn.     DISPO:  Home pending drain and Little removal with voiding trial     Discussed with Dr. Tiwari

## 2022-04-10 NOTE — SUBJECTIVE & OBJECTIVE
Interval History: No acute event overnight.  Pain will control.  Little still in place and making good urine.  Radicular pain is gone.  Tolerating p.o. intake.  Had a bowel movement yesterday.  Drain with minimal output in the canister but not documented yet.  He has been out of bed to the chair.  PT OT cleared for home.  Potential discharge today pending fully removal and drain output and voiding trial.          Medications:  Continuous Infusions:  Scheduled Meds:   acetaminophen  1,000 mg Oral Q8H    amLODIPine  10 mg Oral Daily    ceFAZolin (ANCEF) IVPB  2 g Intravenous Q8H    gabapentin  300 mg Oral TID    heparin (porcine)  5,000 Units Subcutaneous Q8H    lactulose  10 g Oral TID    mupirocin   Nasal BID    polyethylene glycol  17 g Oral Daily    senna-docusate 8.6-50 mg  1 tablet Oral Daily    tiZANidine  4 mg Oral Daily     PRN Meds:aluminum-magnesium hydroxide-simethicone, diazePAM, glycerin adult, melatonin, morphine, ondansetron, oxyCODONE, oxyCODONE, prochlorperazine, senna-docusate 8.6-50 mg, sodium chloride 0.9%     Review of Systems  Objective:     Weight: 113.4 kg (250 lb)  Body mass index is 35.87 kg/m².  Vital Signs (Most Recent):  Temp: 98.9 °F (37.2 °C) (04/10/22 1141)  Pulse: 83 (04/10/22 1141)  Resp: 18 (04/10/22 1152)  BP: 112/78 (04/10/22 1141)  SpO2: 99 % (04/10/22 1141) Vital Signs (24h Range):  Temp:  [98.5 °F (36.9 °C)-100.1 °F (37.8 °C)] 98.9 °F (37.2 °C)  Pulse:  [83-97] 83  Resp:  [17-19] 18  SpO2:  [96 %-99 %] 99 %  BP: (100-158)/(75-88) 112/78                          Closed/Suction Drain 04/06/22 1549 Left Back Accordion 10 Fr. (Active)   Dressing Type Transparent (Tegaderm) 04/08/22 2133   Dressing Status Clean;Intact;Dry 04/08/22 2133   Dressing Intervention Integrity maintained 04/08/22 2133   Drainage Serosanguineous 04/08/22 2133   Status To bulb suction 04/08/22 2133   Output (mL) 0 mL 04/09/22 0528            Closed/Suction Drain 04/06/22 1551 Right Back Accordion 10 Fr.  "(Active)   Dressing Type Transparent (Tegaderm) 04/08/22 2133   Dressing Status Intact;Dry;Clean 04/08/22 2133   Dressing Intervention Integrity maintained 04/08/22 2133   Drainage Serosanguineous 04/08/22 2133   Status To bulb suction 04/08/22 2133   Output (mL) 40 mL 04/09/22 0528            Urethral Catheter 04/06/22 0915 Latex 16 Fr. (Active)   $ Little Insertion Bedside Insertion Performed 04/09/22 0800   Site Assessment Clean;Intact;Dry 04/09/22 0800   Collection Container Urimeter 04/09/22 0800   Securement Method secured to top of thigh w/ adhesive device 04/09/22 0800   Catheter Care Performed no 04/09/22 0800   Reason for Continuing Urinary Catheterization Placed by Urology Service 04/09/22 0800   CAUTI Prevention Bundle Securement Device in place with 1" slack;Intact seal between catheter & drainage tubing;Drainage bag/urimeter off the floor;Sheeting clip in use;No dependent loops or kinks;Drainage bag/urimeter not overfilled (<2/3 full);Drainage bag/urimeter below bladder 04/09/22 0800   Output (mL) 980 mL 04/08/22 1855       Physical Exam    Neurosurgery Physical Exam  General: well developed, well nourished, no distress.   Head: normocephalic, atraumatic  Neurologic: Alert and oriented. Thought content appropriate.  GCS: Motor: 6/Verbal: 5/Eyes: 4 GCS Total: 15  Mental Status: Awake, Alert, Oriented x 4  Language: No aphasia  Speech: No dysarthria  Cranial nerves: face symmetric, tongue midline, CN II-XII grossly intact.   Eyes: pupils equal, round, reactive to light with accommodation, EOMI.   Pulmonary: normal respirations, no signs of respiratory distress  Abdomen: soft, non-distended, not tender to palpation  Skin: Skin is warm, dry and intact.  Sensory: intact to light touch throughout     Motor Strength:Moves all extremities spontaneously with good tone.   No abnormal movements seen.      Strength   Deltoids Triceps Biceps Wrist Extension Wrist Flexion Hand    Upper: R 5/5 5/5 5/5 5/5 5/5 5/5 "     L 5/5 5/5 5/5 5/5 5/5 5/5       Iliopsoas Quadriceps Knee  Flexion Tibialis  anterior Gastro- cnemius EHL   Lower: R 5/5 5/5 5/5 5/5 5/5 4+     L 5/5 5/5 5/5 5/5 5/5 5/5      Incision: Clean, dry, staples intact. Skin edges well approximated. No surrounding erythema or edema. No drainage or TTP. Right HV drain in place to suction.        Significant Labs:  Recent Labs   Lab 04/09/22  0700 04/10/22  0609    100   * 135*   K 3.7 4.0   CL 99 100   CO2 26 25   BUN 8 7   CREATININE 0.8 0.9   CALCIUM 8.4* 8.2*       Recent Labs   Lab 04/09/22  0700 04/10/22  0609   WBC 11.98 11.20   HGB 9.5* 9.0*   HCT 28.2* 27.4*    229       No results for input(s): LABPT, INR, APTT in the last 48 hours.  Microbiology Results (last 7 days)       ** No results found for the last 168 hours. **          All pertinent labs from the last 24 hours have been reviewed.    Significant Diagnostics:  No results found in the last 24 hours.

## 2022-04-10 NOTE — NURSING
POC reviewed with pt and pt verbalized understanding. Questions/Concerns addressed. A&Ox3. Calm/cooperative. NADN. Respirations equal and unlabored. Bed in low position, side rails up x3.  Safety/Fall precautions in place per facility protocol for safety. Instructed pt to press call bell for assistance if needed pt verbalized understanding. VS stable. Neuro assessment completed see assessment. Will continue to monitor closely throughout this [id number] nightshift Safety maintained. Call bell in reach. Bed alarm refused. Ambulates independently. Has  brace when out of the bed per orders. Continent of bowel. Has miranda catheter intact to  bag to gravity no complications urine yashira. Took night medications swallows without difficulty. Sat up in recliner chair this evening for comfort. Pt has x 1 HV drain still intact see assessment.

## 2022-04-10 NOTE — PROGRESS NOTES
"[facility] ([facility])  Neurosurgery  Progress Note    Subjective:     History of Present Illness: "Chris Mahmood is a 50 y.o. male with PMH of GERD, current smoker, and HTN. He is being seen in clinic today to discuss his concerns with progressive low back and bilateral leg pain. States that he has always struggled with his back due to his profession as a ; however, in 2015 the pain became so severe that it started to affect his ability to perform his work duties. He was recently laid off from his job as a result of the COVID pandemic. Denies trauma.      "Describes the pain as constant, aching, and stabbing across his low back bilaterally with radiation in the posterior aspect of his thighs L > right. States that intermittently with ambulation he experiences sharp shooting pain in anterior and lateral aspects of his legs primarily from the knees down. Rates the back pain as a 10/10 and the left leg as a 8/10 and the right leg as a 6/10. Aggravating factors include walking, prolonged standing, and lying down. Alleviating factors include sitting and bending forward to stretch. He ambulates without the use of assistive devices. Denies b/b dysfunction, saddle anesthesia, or gait instability. Reports weakness in the legs L > R as a result of the pain; as well as numbness and tingling primarily in the L5/S1 dermatome on the left when the pain is severe. The patient attempted PT, but was unable to continue the sessions due to the pain. He has an appointment later this month with pain management to discuss ANA."      As of 6/24/21, the patient reports that he sees pain management tomorrow for discussion of ANA. He has severe, debilitating pain in his left leg 99% of the time. His symptoms are now sever even when sitting down. He does get some relief with bending over, as over a shopping cart.       He denies bleeding, infectious, or anesthetic complications from his previous elbow " "surgery. He smokes 2-3 cigars daily, and he drinks a little moonshine.      As of 2/17/22, the patient reports that he has been getting ANA and RFA, which have been helpful, but the pain is "coming back with a vengeance." He states that he is unable to keep living with the current level of pain he's experiencing.      Both legs hurt, but the left leg has worse shooting pain. This is different than when he got his previous MRI.      He is still smoking cigars. His gastric reflux has been acting up.       Post-Op Info:  Procedure(s) (LRB):  FUSION, SPINE, LUMBAR, TLIF, POSTERIOR APPROACH, USING PEDICLE SCREW (N/A)  CYSTOSCOPY, FLEXIBLE with placement of miranda catheter  FUSION, SPINE, POSTERIOR SPINAL COLUMN, LUMBAR, USING COMPUTER-ASSISTED NAVIGATION  LAMINECTOMY, SPINE, LUMBAR   4 Days Post-Op     Interval History: No acute event overnight.  Pain will control.  Miranda still in place and making good urine.  Radicular pain is gone.  Tolerating p.o. intake.  Had a bowel movement yesterday.  Drain with minimal output in the canister but not documented yet.  He has been out of bed to the chair.  PT OT cleared for home.  Potential discharge today pending fully removal and drain output and voiding trial.          Medications:  Continuous Infusions:  Scheduled Meds:   acetaminophen  1,000 mg Oral Q8H    amLODIPine  10 mg Oral Daily    ceFAZolin (ANCEF) IVPB  2 g Intravenous Q8H    gabapentin  300 mg Oral TID    heparin (porcine)  5,000 Units Subcutaneous Q8H    lactulose  10 g Oral TID    mupirocin   Nasal BID    polyethylene glycol  17 g Oral Daily    senna-docusate 8.6-50 mg  1 tablet Oral Daily    tiZANidine  4 mg Oral Daily     PRN Meds:aluminum-magnesium hydroxide-simethicone, diazePAM, glycerin adult, melatonin, morphine, ondansetron, oxyCODONE, oxyCODONE, prochlorperazine, senna-docusate 8.6-50 mg, sodium chloride 0.9%     Review of Systems  Objective:     Weight: 113.4 kg (250 lb)  Body mass index is 35.87 " "kg/m².  Vital Signs (Most Recent):  Temp: 98.9 °F (37.2 °C) (04/10/22 1141)  Pulse: 83 (04/10/22 1141)  Resp: 18 (04/10/22 1152)  BP: 112/78 (04/10/22 1141)  SpO2: 99 % (04/10/22 1141) Vital Signs (24h Range):  Temp:  [98.5 °F (36.9 °C)-100.1 °F (37.8 °C)] 98.9 °F (37.2 °C)  Pulse:  [83-97] 83  Resp:  [17-19] 18  SpO2:  [96 %-99 %] 99 %  BP: (100-158)/(75-88) 112/78                          Closed/Suction Drain 04/06/22 1549 Left Back Accordion 10 Fr. (Active)   Dressing Type Transparent (Tegaderm) 04/08/22 2133   Dressing Status Clean;Intact;Dry 04/08/22 2133   Dressing Intervention Integrity maintained 04/08/22 2133   Drainage Serosanguineous 04/08/22 2133   Status To bulb suction 04/08/22 2133   Output (mL) 0 mL 04/09/22 0528            Closed/Suction Drain 04/06/22 1551 Right Back Accordion 10 Fr. (Active)   Dressing Type Transparent (Tegaderm) 04/08/22 2133   Dressing Status Intact;Dry;Clean 04/08/22 2133   Dressing Intervention Integrity maintained 04/08/22 2133   Drainage Serosanguineous 04/08/22 2133   Status To bulb suction 04/08/22 2133   Output (mL) 40 mL 04/09/22 0528            Urethral Catheter 04/06/22 0915 Latex 16 Fr. (Active)   $ Little Insertion Bedside Insertion Performed 04/09/22 0800   Site Assessment Clean;Intact;Dry 04/09/22 0800   Collection Container Urimeter 04/09/22 0800   Securement Method secured to top of thigh w/ adhesive device 04/09/22 0800   Catheter Care Performed no 04/09/22 0800   Reason for Continuing Urinary Catheterization Placed by Urology Service 04/09/22 0800   CAUTI Prevention Bundle Securement Device in place with 1" slack;Intact seal between catheter & drainage tubing;Drainage bag/urimeter off the floor;Sheeting clip in use;No dependent loops or kinks;Drainage bag/urimeter not overfilled (<2/3 full);Drainage bag/urimeter below bladder 04/09/22 0800   Output (mL) 980 mL 04/08/22 1855       Physical Exam    Neurosurgery Physical Exam  General: well developed, well " nourished, no distress.   Head: normocephalic, atraumatic  Neurologic: Alert and oriented. Thought content appropriate.  GCS: Motor: 6/Verbal: 5/Eyes: 4 GCS Total: 15  Mental Status: Awake, Alert, Oriented x 4  Language: No aphasia  Speech: No dysarthria  Cranial nerves: face symmetric, tongue midline, CN II-XII grossly intact.   Eyes: pupils equal, round, reactive to light with accommodation, EOMI.   Pulmonary: normal respirations, no signs of respiratory distress  Abdomen: soft, non-distended, not tender to palpation  Skin: Skin is warm, dry and intact.  Sensory: intact to light touch throughout     Motor Strength:Moves all extremities spontaneously with good tone.   No abnormal movements seen.      Strength   Deltoids Triceps Biceps Wrist Extension Wrist Flexion Hand    Upper: R 5/5 5/5 5/5 5/5 5/5 5/5     L 5/5 5/5 5/5 5/5 5/5 5/5       Iliopsoas Quadriceps Knee  Flexion Tibialis  anterior Gastro- cnemius EHL   Lower: R 5/5 5/5 5/5 5/5 5/5 4+     L 5/5 5/5 5/5 5/5 5/5 5/5      Incision: Clean, dry, staples intact. Skin edges well approximated. No surrounding erythema or edema. No drainage or TTP. Right HV drain in place to suction.        Significant Labs:  Recent Labs   Lab 04/09/22  0700 04/10/22  0609    100   * 135*   K 3.7 4.0   CL 99 100   CO2 26 25   BUN 8 7   CREATININE 0.8 0.9   CALCIUM 8.4* 8.2*       Recent Labs   Lab 04/09/22  0700 04/10/22  0609   WBC 11.98 11.20   HGB 9.5* 9.0*   HCT 28.2* 27.4*    229       No results for input(s): LABPT, INR, APTT in the last 48 hours.  Microbiology Results (last 7 days)       ** No results found for the last 168 hours. **          All pertinent labs from the last 24 hours have been reviewed.    Significant Diagnostics:  No results found in the last 24 hours.      Assessment/Plan:     * Spondylolisthesis of lumbosacral region  Chris Mahmood is a 51 y.o. male with severe stenosis and dynamic instability with L4-L5/L5-S1  spondylolisthesis. Now s/p L4-5, L5-S1 TLIF and L3-iliac fusion on 4/6/22      - Neuro stable  - Left HV drain removed.  One drain still left in place with minimal output will check final output.  Possible removal today  - Post op H/H stablr  - Post op XR with satisfactory placement of hardware  - Brace to be worn when up and OOB  - Pain control: Better controlled on current regimen. Continue scheduled tylenol 1 g tid. Oxycodone 5 mg q 4 hours prn moderate pain. Oxycodone 10 mg q 4 hours prn severe pain. Morphine IV for breakthrough pain.   -- Little in place per Urology. Keep in place for 3-5 days, will DC today and follow-up voiding trial  - PT/OT/OOB. Pt/OT recommending outpatient therapy   - DVT prophylaxis: TEDs/SCDs/SQH  - Bowel regimen: No BM post op. senna and miralax daily. Lactulose added for constipation. Suppository prn.     DISPO:  Home pending drain and Little removal with voiding trial     Discussed with Dr. Karie Gonzalez MD  Neurosurgery  [facility] ([facility])

## 2022-04-10 NOTE — NURSING
Pt is c/o incision site itching and would like something to put on incision. Will report off to oncoming nursing staff. No s/s of infection noted.

## 2022-04-10 NOTE — PLAN OF CARE
Problem: Adult Inpatient Plan of Care  Goal: Plan of Care Review  Outcome: Ongoing, Progressing  Goal: Absence of Hospital-Acquired Illness or Injury  Outcome: Ongoing, Progressing  Intervention: Prevent Infection  Flowsheets (Taken 4/10/2022 0257)  Infection Prevention: hand hygiene promoted  Goal: Optimal Comfort and Wellbeing  Outcome: Ongoing, Progressing     Problem: Infection  Goal: Absence of Infection Signs and Symptoms  Outcome: Ongoing, Progressing

## 2022-04-11 VITALS
TEMPERATURE: 98 F | OXYGEN SATURATION: 99 % | SYSTOLIC BLOOD PRESSURE: 139 MMHG | DIASTOLIC BLOOD PRESSURE: 78 MMHG | HEIGHT: 70 IN | RESPIRATION RATE: 18 BRPM | BODY MASS INDEX: 35.79 KG/M2 | HEART RATE: 80 BPM | WEIGHT: 250 LBS

## 2022-04-11 LAB
ANION GAP SERPL CALC-SCNC: 8 MMOL/L (ref 8–16)
BASOPHILS # BLD AUTO: 0.04 K/UL (ref 0–0.2)
BASOPHILS NFR BLD: 0.4 % (ref 0–1.9)
BUN SERPL-MCNC: 7 MG/DL (ref 6–20)
CALCIUM SERPL-MCNC: 9.3 MG/DL (ref 8.7–10.5)
CHLORIDE SERPL-SCNC: 101 MMOL/L (ref 95–110)
CO2 SERPL-SCNC: 25 MMOL/L (ref 23–29)
CREAT SERPL-MCNC: 0.9 MG/DL (ref 0.5–1.4)
DIFFERENTIAL METHOD: ABNORMAL
EOSINOPHIL # BLD AUTO: 0.3 K/UL (ref 0–0.5)
EOSINOPHIL NFR BLD: 3.3 % (ref 0–8)
ERYTHROCYTE [DISTWIDTH] IN BLOOD BY AUTOMATED COUNT: 13.3 % (ref 11.5–14.5)
EST. GFR  (AFRICAN AMERICAN): >60 ML/MIN/1.73 M^2
EST. GFR  (NON AFRICAN AMERICAN): >60 ML/MIN/1.73 M^2
GLUCOSE SERPL-MCNC: 108 MG/DL (ref 70–110)
HCT VFR BLD AUTO: 27.9 % (ref 40–54)
HGB BLD-MCNC: 9.5 G/DL (ref 14–18)
IMM GRANULOCYTES # BLD AUTO: 0.07 K/UL (ref 0–0.04)
IMM GRANULOCYTES NFR BLD AUTO: 0.7 % (ref 0–0.5)
LYMPHOCYTES # BLD AUTO: 2.4 K/UL (ref 1–4.8)
LYMPHOCYTES NFR BLD: 24.1 % (ref 18–48)
MCH RBC QN AUTO: 31.7 PG (ref 27–31)
MCHC RBC AUTO-ENTMCNC: 34.1 G/DL (ref 32–36)
MCV RBC AUTO: 93 FL (ref 82–98)
MONOCYTES # BLD AUTO: 1.9 K/UL (ref 0.3–1)
MONOCYTES NFR BLD: 19.1 % (ref 4–15)
NEUTROPHILS # BLD AUTO: 5.2 K/UL (ref 1.8–7.7)
NEUTROPHILS NFR BLD: 52.4 % (ref 38–73)
NRBC BLD-RTO: 0 /100 WBC
PLATELET # BLD AUTO: 295 K/UL (ref 150–450)
PMV BLD AUTO: 9.8 FL (ref 9.2–12.9)
POTASSIUM SERPL-SCNC: 3.9 MMOL/L (ref 3.5–5.1)
RBC # BLD AUTO: 3 M/UL (ref 4.6–6.2)
SODIUM SERPL-SCNC: 134 MMOL/L (ref 136–145)
WBC # BLD AUTO: 9.81 K/UL (ref 3.9–12.7)

## 2022-04-11 PROCEDURE — 99024 POSTOP FOLLOW-UP VISIT: CPT | Mod: ,,, | Performed by: PHYSICIAN ASSISTANT

## 2022-04-11 PROCEDURE — 99024 PR POST-OP FOLLOW-UP VISIT: ICD-10-PCS | Mod: ,,, | Performed by: PHYSICIAN ASSISTANT

## 2022-04-11 PROCEDURE — 80048 BASIC METABOLIC PNL TOTAL CA: CPT | Performed by: STUDENT IN AN ORGANIZED HEALTH CARE EDUCATION/TRAINING PROGRAM

## 2022-04-11 PROCEDURE — 63600175 PHARM REV CODE 636 W HCPCS: Performed by: PHYSICIAN ASSISTANT

## 2022-04-11 PROCEDURE — 25000003 PHARM REV CODE 250: Performed by: PHYSICIAN ASSISTANT

## 2022-04-11 PROCEDURE — 36415 COLL VENOUS BLD VENIPUNCTURE: CPT | Performed by: STUDENT IN AN ORGANIZED HEALTH CARE EDUCATION/TRAINING PROGRAM

## 2022-04-11 PROCEDURE — 63600175 PHARM REV CODE 636 W HCPCS: Performed by: STUDENT IN AN ORGANIZED HEALTH CARE EDUCATION/TRAINING PROGRAM

## 2022-04-11 PROCEDURE — 85025 COMPLETE CBC W/AUTO DIFF WBC: CPT | Performed by: STUDENT IN AN ORGANIZED HEALTH CARE EDUCATION/TRAINING PROGRAM

## 2022-04-11 PROCEDURE — 25000003 PHARM REV CODE 250: Performed by: STUDENT IN AN ORGANIZED HEALTH CARE EDUCATION/TRAINING PROGRAM

## 2022-04-11 RX ORDER — CEPHALEXIN 500 MG/1
500 CAPSULE ORAL EVERY 6 HOURS
Qty: 20 CAPSULE | Refills: 0 | Status: SHIPPED | OUTPATIENT
Start: 2022-04-11 | End: 2022-04-16

## 2022-04-11 RX ORDER — DIAZEPAM 5 MG/1
5 TABLET ORAL EVERY 12 HOURS PRN
Qty: 28 TABLET | Refills: 0 | Status: SHIPPED | OUTPATIENT
Start: 2022-04-11 | End: 2022-07-07

## 2022-04-11 RX ORDER — ACETAMINOPHEN 500 MG
1000 TABLET ORAL EVERY 8 HOURS
Qty: 84 TABLET | Refills: 0 | Status: SHIPPED | OUTPATIENT
Start: 2022-04-11 | End: 2022-04-25

## 2022-04-11 RX ORDER — OXYCODONE HYDROCHLORIDE 10 MG/1
10 TABLET ORAL EVERY 6 HOURS PRN
Qty: 56 TABLET | Refills: 0 | Status: SHIPPED | OUTPATIENT
Start: 2022-04-11 | End: 2022-04-21 | Stop reason: ALTCHOICE

## 2022-04-11 RX ADMIN — HEPARIN SODIUM 5000 UNITS: 5000 INJECTION INTRAVENOUS; SUBCUTANEOUS at 05:04

## 2022-04-11 RX ADMIN — MUPIROCIN: 20 OINTMENT TOPICAL at 08:04

## 2022-04-11 RX ADMIN — OXYCODONE HYDROCHLORIDE 10 MG: 10 TABLET ORAL at 12:04

## 2022-04-11 RX ADMIN — MORPHINE SULFATE 2 MG: 2 INJECTION, SOLUTION INTRAMUSCULAR; INTRAVENOUS at 05:04

## 2022-04-11 RX ADMIN — OXYCODONE HYDROCHLORIDE 10 MG: 10 TABLET ORAL at 02:04

## 2022-04-11 RX ADMIN — AMLODIPINE BESYLATE 10 MG: 10 TABLET ORAL at 08:04

## 2022-04-11 RX ADMIN — DIAZEPAM 5 MG: 5 TABLET ORAL at 09:04

## 2022-04-11 RX ADMIN — TIZANIDINE 4 MG: 4 TABLET ORAL at 08:04

## 2022-04-11 RX ADMIN — GABAPENTIN 300 MG: 300 CAPSULE ORAL at 08:04

## 2022-04-11 RX ADMIN — Medication 2 G: at 05:04

## 2022-04-11 RX ADMIN — MORPHINE SULFATE 2 MG: 2 INJECTION, SOLUTION INTRAMUSCULAR; INTRAVENOUS at 12:04

## 2022-04-11 RX ADMIN — OXYCODONE HYDROCHLORIDE 10 MG: 10 TABLET ORAL at 08:04

## 2022-04-11 RX ADMIN — SENNOSIDES AND DOCUSATE SODIUM 1 TABLET: 50; 8.6 TABLET ORAL at 08:04

## 2022-04-11 RX ADMIN — ACETAMINOPHEN 1000 MG: 500 TABLET ORAL at 05:04

## 2022-04-11 NOTE — PLAN OF CARE
[facility] ([facility])  Discharge Final Note    Primary Care Provider: Desi Oliveira MD    Expected Discharge Date: 4/11/2022    Patient to be discharged home.  The patient will have OP Therapy upon discharge.  Family to provide transportation home.  Neurosurgery clinic to schedule follow up appointment.      Final Discharge Note (most recent)     Final Note - 04/11/22 1336        Final Note    Assessment Type Final Discharge Note     Anticipated Discharge Disposition Home or Self Care        Post-Acute Status    Post-Acute Authorization Other     Other Status No Post-Acute Service Needs     Discharge Delays None known at this time                 Important Message from Medicare             Contact Info     [facility] [address]   Specialty: Neurosurgery    [address]  [place] [address]   Phone: [phone]       Next Steps: Follow up in 2 week(s)    Instructions: For wound check    Susanne Tiwari MD   Specialty: Neurosurgery    [address] [facility] [address]   Phone: [phone]       Next Steps: Follow up in 6 week(s)    Instructions: Post-op follow up

## 2022-04-11 NOTE — DISCHARGE INSTRUCTIONS
Post op Spine Patient Instructions    Activity Restrictions:  [x]  Return to work will be determined on an individual basis.  [x]  No lifting greater than 5-10 pounds.  [x]  Avoid bending and twisting the area of your surgery more than 45 degrees from neutral position in any direction.  [x]  No driving or operating machinery:  [x]  until cleared by your surgeon.  [x]  while taking narcotic pain medications or muscle relaxants.  [x]  Wear your brace at all times except when lying in bed, showering, using the restroom, or performing hygiene tasks.   [x]  Increase ambulation over the next 2 weeks so that you are walking 2 miles per day at 2 weeks post-operatively.  [x]  Walk on paved surfaces only. It is okay to walk up and down stairs while holding onto a side rail.  [x]  No sexual activity for 6 weeks.    Discharge Medication/Follow-up:  [x]  Please refer to discharge medication reconciliation form.  [x]  Do not take any OTC products containing acetaminophen (tylenol) at the same time as you take your narcotic pain medication. Medications that may contain acetaminophen include but are not limited to: Excedrin and other headache medications, arthritis medications, cold and sinus medications, etc. Please review the list of active ingredients in any OTC medication prior to taking it.  [x]  Do not take ANY Aspirin or Aspirin containing products for 2 weeks after surgery.   [x]  Do not take ANY herbal supplements for 2 weeks after surgery.    [x]  Do not take ANY non-steroidal anti-inflammatory drugs (NSAIDS), including the following: ibuprofen, naprosyn, Aleve, Advil, Indocin, Mobic, or Celebrex for 12 weeks after surgery.   [x]  Prescriptions for appropriate medication will be given upon discharge.  [x]  Take docusate (Colace 100 mg): take one capsule a day as needed for constipation. You can get this over the counter.  [x]  Follow-up appointment:  [x]  10-14 days post-op for wound check by physician  assistant/nurse  [x]  4-6 weeks with MD:  [x]  with x-rays  [x]  An appointment will be mailed to you.    Wound Care:  [x]  No bandage required. Keep your incision open to the air.  [x]  You may shower on the 2nd day after your surgery. Keep the incision clean and dry at all times. Do not allow the force of water to hit the incision. If the incision gets damp, pat it dry. Do not rub or scrub the incision.  [x]  You cannot take a bath until 8 weeks after surgery.  [x]  Apply bacitracin to incision twice a day for 2 weeks.    Call your doctor or go to the Emergency Room for any signs of infection, including: increased redness, drainage, pain, or fever (temperature ?101.5 for 24 hours). Call your doctor or go to the Emergency Room if there are any localized neurological changes; problems with speech, vision, numbness, tingling, weakness, or severe headache; inability to control urination or bowel movements; inability to urinate; or for other concerns.            Special Instructions:  [x]  No use of tobacco products.  [x]  Diet: Please eat a regular diet as tolerated.      Physicians need 3 days' notice for pain medicine to be refilled. Pain medicine will only be refilled between 8 AM and 5 PM, Monday through Friday, due to Food and Drug Administration regulation of documentation.    If you have any questions about this form, please call [phone].    Form No. 52733 (Revised 10/31/2013)

## 2022-04-11 NOTE — DISCHARGE SUMMARY
"[facility] - Neurosurgery ([facility])  Neurosurgery  Discharge Summary      Patient Name: Chris Mahmood  MRN: [id number]  Admission Date: 4/6/2022  Hospital Length of Stay: 5 days  Discharge Date and Time: 4/11/2022  Attending Physician: Susanne Tiwari MD   Discharging Provider: Flor Mendoza PA-C  Primary Care Provider: Desi Oliveira MD    HPI:   "Chris Mahmood is a 50 y.o. male with PMH of GERD, current smoker, and HTN. He is being seen in clinic today to discuss his concerns with progressive low back and bilateral leg pain. States that he has always struggled with his back due to his profession as a ; however, in 2015 the pain became so severe that it started to affect his ability to perform his work duties. He was recently laid off from his job as a result of the COVID pandemic. Denies trauma.      "Describes the pain as constant, aching, and stabbing across his low back bilaterally with radiation in the posterior aspect of his thighs L > right. States that intermittently with ambulation he experiences sharp shooting pain in anterior and lateral aspects of his legs primarily from the knees down. Rates the back pain as a 10/10 and the left leg as a 8/10 and the right leg as a 6/10. Aggravating factors include walking, prolonged standing, and lying down. Alleviating factors include sitting and bending forward to stretch. He ambulates without the use of assistive devices. Denies b/b dysfunction, saddle anesthesia, or gait instability. Reports weakness in the legs L > R as a result of the pain; as well as numbness and tingling primarily in the L5/S1 dermatome on the left when the pain is severe. The patient attempted PT, but was unable to continue the sessions due to the pain. He has an appointment later this month with pain management to discuss ANA."      As of 6/24/21, the patient reports that he sees pain management tomorrow for discussion of ANA. He has severe, debilitating pain in his " "left leg 99% of the time. His symptoms are now sever even when sitting down. He does get some relief with bending over, as over a shopping cart.       He denies bleeding, infectious, or anesthetic complications from his previous elbow surgery. He smokes 2-3 cigars daily, and he drinks a little moonshine.      As of 2/17/22, the patient reports that he has been getting ANA and RFA, which have been helpful, but the pain is "coming back with a vengeance." He states that he is unable to keep living with the current level of pain he's experiencing.      Both legs hurt, but the left leg has worse shooting pain. This is different than when he got his previous MRI.      He is still smoking cigars. His gastric reflux has been acting up.       Procedure(s) (LRB):  FUSION, SPINE, LUMBAR, TLIF, POSTERIOR APPROACH, USING PEDICLE SCREW (N/A)  CYSTOSCOPY, FLEXIBLE with placement of miranda catheter  FUSION, SPINE, POSTERIOR SPINAL COLUMN, LUMBAR, USING COMPUTER-ASSISTED NAVIGATION  LAMINECTOMY, SPINE, LUMBAR     Hospital Course: 4/7: NAEON. Reports lower back pain not well controlled on current regimen. Neuro stable. Miranda in place per Urology. Drains with high output, keep in place. PT/OT today and post op XR pending.   4/8: NAEON. Pain better controlled today. XR shows good position of hardware. No paresthesias. Miranda still in place. Drains with high output. Home with home health pending drain removal   4/9: NAEON. Pain continues to be controlled. WV not holding good seal, removed without complication. L drain with 40 cc output, removed without complication. Right drain in place for 150 cc output. Neuro exam stable. Lactulose added for constipation as well as prn suppository. Plan for miranda removal early Monday AM.   4/10: No acute event overnight.  Pain will control.  Miranda still in place and making good urine.  Radicular pain is gone.  Tolerating p.o. intake.  Had a bowel movement yesterday.  Drain with minimal output in the " canister but not documented yet.  He has been out of bed to the chair.  PT OT cleared for home.  Potential discharge today pending fully removal and drain output and voiding trial.  4/11: NAEON. Drain discontinued, patient tolerated procedure well. Neuro stable. Encouraged IS hourly. Patient with brace in place. Voiding spontaneously. BM yesterday. 5 days keflex at discharge for skin ppx. Medically stable for discharge to home with outpatient PT/OT. Incision care and activity recommendations reviewed. Plan of care discussed with patient and he voiced understanding. His questions were all answered. Follow-up in Neurosurgery clinic arranged.       Goals of Care Treatment Preferences:         Consults: PT/OT    Significant Diagnostic Studies: Labs:   BMP:   Recent Labs   Lab 04/10/22  0609 04/11/22  0746    108   * 134*   K 4.0 3.9    101   CO2 25 25   BUN 7 7   CREATININE 0.9 0.9   CALCIUM 8.2* 9.3   , CMP   Recent Labs   Lab 04/10/22  0609 04/11/22  0746   * 134*   K 4.0 3.9    101   CO2 25 25    108   BUN 7 7   CREATININE 0.9 0.9   CALCIUM 8.2* 9.3   ANIONGAP 10 8   ESTGFRAFRICA >60.0 >60.0   EGFRNONAA >60.0 >60.0   , CBC   Recent Labs   Lab 04/10/22  0609 04/11/22  0746   WBC 11.20 9.81   HGB 9.0* 9.5*   HCT 27.4* 27.9*    295    and INR   Lab Results   Component Value Date    INR 1.0 04/06/2022       Radiology: thoracolumbar xray  Neurosurgery Physical Exam  General: well developed, well nourished, no distress.   Head: normocephalic, atraumatic  Neurologic: Alert and oriented. Thought content appropriate.  GCS: Motor: 6/Verbal: 5/Eyes: 4 GCS Total: 15  Mental Status: Awake, Alert, Oriented x 4  Language: No aphasia  Speech: No dysarthria  Cranial nerves: face symmetric, tongue midline, CN II-XII grossly intact.   Eyes: pupils equal, round, reactive to light with accommodation, EOMI.   Pulmonary: normal respirations, no signs of respiratory distress  Abdomen: soft,  non-distended, not tender to palpation  Skin: Skin is warm, dry and intact.  Sensory: intact to light touch throughout     Motor Strength:Moves all extremities spontaneously with good tone.   No abnormal movements seen.      Strength   Deltoids Triceps Biceps Wrist Extension Wrist Flexion Hand    Upper: R 5/5 5/5 5/5 5/5 5/5 5/5     L 5/5 5/5 5/5 5/5 5/5 5/5       Iliopsoas Quadriceps Knee  Flexion Tibialis  anterior Gastro- cnemius EHL   Lower: R 5/5 5/5 5/5 5/5 5/5 4+/5     L 5/5 5/5 5/5 5/5 5/5 5/5      Incision: Clean, dry, staples intact. Skin edges well approximated. No surrounding erythema or edema. No drainage or TTP. Right HV drain in place to suction.     Pending Diagnostic Studies:     Procedure Component Value Units Date/Time    CT Interoperative Limited [[id number]] Resulted: 04/07/22 0820    Order Status: Sent Lab Status: In process Updated: 04/07/22 0821        Final Active Diagnoses:    Diagnosis Date Noted POA    PRINCIPAL PROBLEM:  Spondylolisthesis of lumbosacral region [M43.17] 06/28/2021 Yes      Problems Resolved During this Admission:      Discharged Condition: good     Disposition: Home or Self Care    Follow Up:   Follow-up Information     [facility] - Neurosurgery [facility] Follow up in 2 week(s).    Specialty: Neurosurgery  Why: For wound check  Contact information:  [address] [facility] [address] [phone]  Additional information:  8th Floor [facility] [place]   Please park in [facility].   Check in desk is located in the lobby. Please take the C elevator to 8th floor which opens to the lobby.           Susanne Tiwari MD Follow up in 6 week(s).    Specialty: Neurosurgery  Why: Post-op follow up  Contact information:  [address] [facility] [id number] [phone]                       Patient Instructions:      Ambulatory referral/consult to Physical/Occupational Therapy   Standing Status: Future   Referral Priority: Routine Referral Type: Physical Medicine    Referral Reason: Specialty Services Required   Requested Specialty: Physical Therapy   Number of Visits Requested: 1     Ambulatory referral/consult to Physical/Occupational Therapy   Standing Status: Future   Referral Priority: Routine Referral Type: Physical Medicine   Referral Reason: Specialty Services Required   Requested Specialty: Occupational Therapy   Number of Visits Requested: 1     Notify your health care provider if you experience any of the following:  increased confusion or weakness     Notify your health care provider if you experience any of the following:  persistent dizziness, light-headedness, or visual disturbances     Notify your health care provider if you experience any of the following:  worsening rash     Notify your health care provider if you experience any of the following:  severe persistent headache     Notify your health care provider if you experience any of the following:  difficulty breathing or increased cough     Notify your health care provider if you experience any of the following:  redness, tenderness, or signs of infection (pain, swelling, redness, odor or green/yellow discharge around incision site)     Notify your health care provider if you experience any of the following:  severe uncontrolled pain     Notify your health care provider if you experience any of the following:  persistent nausea and vomiting or diarrhea     Notify your health care provider if you experience any of the following:  temperature >100.4     Activity as tolerated     Medications:  Reconciled Home Medications:      Medication List      START taking these medications    acetaminophen 500 MG tablet  Commonly known as: TYLENOL  Take 2 tablets (1,000 mg total) by mouth every 8 (eight) hours. for 14 days     cephALEXin 500 MG capsule  Commonly known as: KEFLEX  Take 1 capsule (500 mg total) by mouth every 6 (six) hours. for 5 days     diazePAM 5 MG tablet  Commonly known as: VALIUM  Take 1 tablet (5  mg total) by mouth every 12 (twelve) hours as needed (spasms).     oxyCODONE 10 mg Tab immediate release tablet  Commonly known as: ROXICODONE  Take 1 tablet (10 mg total) by mouth every 6 (six) hours as needed for Pain.        CONTINUE taking these medications    amLODIPine 5 MG tablet  Commonly known as: NORVASC  once daily.     cyclobenzaprine 10 MG tablet  Commonly known as: FLEXERIL  Take 10 mg by mouth 2 (two) times daily as needed.     folic acid 400 MCG tablet  Commonly known as: FOLVITE  Take 400 mcg by mouth once daily.     gabapentin 300 MG capsule  Commonly known as: NEURONTIN  Take 300 mg by mouth 3 (three) times daily.     pantoprazole 40 MG tablet  Commonly known as: PROTONIX  Take 40 mg by mouth once daily.     tiZANidine 4 MG tablet  Commonly known as: ZANAFLEX  Take 4-8 mg by mouth once daily.        STOP taking these medications    ibuprofen 800 MG tablet  Commonly known as: HINA RODRIGUES PA-C  Neurosurgery  [facility] - Neurosurgery [facility])

## 2022-04-14 PROBLEM — R29.898 WEAKNESS OF BOTH LOWER EXTREMITIES: Status: ACTIVE | Noted: 2022-04-14

## 2022-04-14 PROBLEM — M25.69 DECREASED RANGE OF MOTION OF TRUNK AND BACK: Status: ACTIVE | Noted: 2022-04-14

## 2022-04-14 PROBLEM — M62.81 WEAKNESS OF TRUNK MUSCULATURE: Status: ACTIVE | Noted: 2022-04-14

## 2022-04-21 ENCOUNTER — CLINICAL SUPPORT (OUTPATIENT)
Dept: NEUROSURGERY | Facility: CLINIC | Age: 52
End: 2022-04-21
Payer: MEDICAID

## 2022-04-21 DIAGNOSIS — Z98.1 STATUS POST LUMBAR SPINAL FUSION: ICD-10-CM

## 2022-04-21 DIAGNOSIS — M43.16 SPONDYLOLISTHESIS AT L4-L5 LEVEL: Primary | ICD-10-CM

## 2022-04-21 PROCEDURE — 99999 PR PBB SHADOW E&M-EST. PATIENT-LVL III: ICD-10-PCS | Mod: PBBFAC,,,

## 2022-04-21 PROCEDURE — 99213 OFFICE O/P EST LOW 20 MIN: CPT | Mod: PBBFAC

## 2022-04-21 PROCEDURE — 99999 PR PBB SHADOW E&M-EST. PATIENT-LVL III: CPT | Mod: PBBFAC,,,

## 2022-04-21 RX ORDER — TIZANIDINE 4 MG/1
4-8 TABLET ORAL DAILY
Qty: 60 TABLET | Refills: 0 | Status: SHIPPED | OUTPATIENT
Start: 2022-04-21

## 2022-04-21 RX ORDER — OXYCODONE AND ACETAMINOPHEN 7.5; 325 MG/1; MG/1
1 TABLET ORAL EVERY 8 HOURS PRN
Qty: 60 TABLET | Refills: 0 | Status: SHIPPED | OUTPATIENT
Start: 2022-04-21 | End: 2022-05-03 | Stop reason: SDUPTHER

## 2022-04-22 NOTE — PROGRESS NOTES
Wound Check   Neurosurgery      Mr Chris Mahmood is a pleasant 51 year old male s/p Posterior Fusion, L3 - Pelvis on 4/6/2022 by Dr. Tiwari who presents to clinic for his 2 week follow up accompanied by azael. (For complete diagnosis and procedure, see OP note.) Patient presents walking, gait steady, in NAD, denies any fever, chills, night sweats. Pt is wearing a TLSO back brace. Discussed rationale for wearing the brace.    Pt states pre-op symptoms of left leg pain and inability to walk is resolved. Describes some numbness / cramping left outer calf. Thinks it may be muscle-related now that he is walking again. Rates back (surgical) pain at a 7. When asked, states he took pain meds 4 or 5 times yesterday (Roxycodone 10mg). Discussed spacing pain med and muscle relaxers. States he is not taking diazepam. States he takes Tizanidine during the day but needs a Flexeril at night. Counseled pt about mixing medications and that it puts him at an increased fall risk. Reviewed methods of tapering meds. He v/u and acknowledged the importance.    Had patient stand and lean against exam table. Incision inspected. Appears C/D/I, without drainage or fluctuance. Staple line swabbed with betadine and most staples removed. Elected to leave the inferior-most staples in place until next week. Pt agrees. Tolerated removal OK.    Rx given for Percocet 7.5 and refill of Tizanidine.                                 Pt given written instructions which include:  - Keep incision open to air.   - Wear brace when out of bed.  - No need to put bacitracin ointment on wound   - Can shower and get incision wet, just pat dry and no vigorous scrubbing. Do not submerge incision for another 4 weeks.   - No lifting more than 10 lbs or excessive bending/twisting.   - Follow up in clinic in 4 weeks with x-rays (appts given in writing)  - Encouraged patient to call if they have any questions or concerns prior to next follow up appt.    Saniya Clay,  RN  Neurosurgery

## 2022-04-28 ENCOUNTER — CLINICAL SUPPORT (OUTPATIENT)
Dept: NEUROSURGERY | Facility: CLINIC | Age: 52
End: 2022-04-28
Payer: MEDICAID

## 2022-04-28 NOTE — PROGRESS NOTES
Wound Check   Neurosurgery      Mr Chris Mahmood presents today for additional staple removal after Posterior Lumbar fusion. No complaints. Incision examined, swabbed with Betadine lollipop, staples removed without incident; skin edges approximated. Pt tolerated well.            Encouraged patient to call if they have any questions or concerns prior to next follow up.    Larissa Clay, RN  Nurse Navigator  Neurosurgery & Neuro-Oncology  [facility]

## 2022-05-03 ENCOUNTER — TELEPHONE (OUTPATIENT)
Dept: NEUROSURGERY | Facility: CLINIC | Age: 52
End: 2022-05-03
Payer: MEDICAID

## 2022-05-03 DIAGNOSIS — Z98.1 STATUS POST LUMBAR SPINAL FUSION: ICD-10-CM

## 2022-05-03 DIAGNOSIS — M43.16 SPONDYLOLISTHESIS AT L4-L5 LEVEL: ICD-10-CM

## 2022-05-03 RX ORDER — OXYCODONE AND ACETAMINOPHEN 7.5; 325 MG/1; MG/1
1 TABLET ORAL EVERY 8 HOURS PRN
Qty: 21 TABLET | Refills: 0 | Status: SHIPPED | OUTPATIENT
Start: 2022-05-03 | End: 2022-05-10 | Stop reason: SDUPTHER

## 2022-05-03 NOTE — TELEPHONE ENCOUNTER
sw pt. Reports was only able to get 21/60 tablets of Percocet 7.5 mg Rx'd on 4/21. Requesting refill.  records align with pt's report.    S/w Debbie Franklin PA-C, will send in new Rx to pt's preferred pharmacy this evening. Pt v/u and is in agreement with plan.

## 2022-05-10 DIAGNOSIS — M43.16 SPONDYLOLISTHESIS AT L4-L5 LEVEL: ICD-10-CM

## 2022-05-10 DIAGNOSIS — Z98.1 STATUS POST LUMBAR SPINAL FUSION: ICD-10-CM

## 2022-05-10 RX ORDER — OXYCODONE AND ACETAMINOPHEN 7.5; 325 MG/1; MG/1
1 TABLET ORAL EVERY 8 HOURS PRN
Qty: 21 TABLET | Refills: 0 | Status: SHIPPED | OUTPATIENT
Start: 2022-05-10 | End: 2022-05-16

## 2022-05-10 NOTE — TELEPHONE ENCOUNTER
Pt was contacted and refilled was sent to his local pharmacy  ----- Message from Briseidakath Arringtonard sent at 5/10/2022  2:48 PM CDT -----  Type: Patient Call Back         Who called: Patient         What is the request in detail: Tavo Ortiz I have Chris Mahmood returning your missed call regarding a Rx  oxyCODONE-acetaminophen (PERCOCET) 7.5-325 mg per tablet.         Best call back number: [phone]         Additional Information:   [facility] [place] ([place]), [place] - [address]  [address] [place]) [place] [address]  Phone: [phone] Fax: [phone]             Thank You

## 2022-05-10 NOTE — TELEPHONE ENCOUNTER
----- Message from Don Arijeremias sent at 5/10/2022  1:04 PM CDT -----  Regarding: one Rx needed      The Pt states that he would like for Larissa to call in his Rx- oxyCODONE-acetaminophen (PERCOCET) 7.5-325 mg per tablet.    The Pt states that they are working him hard at therapy and really needs this Rx.    Pharmacy- [facility] [facility] ([place]), [place] - [address]   Phone: [phone]  Fax:  [phone]

## 2022-05-16 DIAGNOSIS — Z98.1 STATUS POST LUMBAR SPINAL FUSION: ICD-10-CM

## 2022-05-16 DIAGNOSIS — M43.16 SPONDYLOLISTHESIS AT L4-L5 LEVEL: ICD-10-CM

## 2022-05-16 RX ORDER — OXYCODONE AND ACETAMINOPHEN 5; 325 MG/1; MG/1
1 TABLET ORAL EVERY 6 HOURS PRN
Qty: 28 TABLET | Refills: 0 | Status: SHIPPED | OUTPATIENT
Start: 2022-05-16 | End: 2022-05-24 | Stop reason: SDUPTHER

## 2022-05-17 ENCOUNTER — TELEPHONE (OUTPATIENT)
Dept: NEUROSURGERY | Facility: CLINIC | Age: 52
End: 2022-05-17
Payer: MEDICAID

## 2022-05-17 NOTE — TELEPHONE ENCOUNTER
Spoke with pt in regards to his refill request  and he states he received prescription, as Debbie states she filled it yesterday at preferred pharmacy on file .

## 2022-05-26 ENCOUNTER — OFFICE VISIT (OUTPATIENT)
Dept: NEUROSURGERY | Facility: CLINIC | Age: 52
End: 2022-05-26
Payer: MEDICAID

## 2022-05-26 ENCOUNTER — HOSPITAL ENCOUNTER (OUTPATIENT)
Dept: RADIOLOGY | Facility: HOSPITAL | Age: 52
Discharge: HOME | End: 2022-05-26
Attending: PHYSICIAN ASSISTANT
Payer: MEDICAID

## 2022-05-26 VITALS
TEMPERATURE: 99 F | SYSTOLIC BLOOD PRESSURE: 138 MMHG | DIASTOLIC BLOOD PRESSURE: 95 MMHG | WEIGHT: 256 LBS | HEART RATE: 70 BPM | BODY MASS INDEX: 36.65 KG/M2 | HEIGHT: 70 IN

## 2022-05-26 DIAGNOSIS — M43.16 SPONDYLOLISTHESIS AT L4-L5 LEVEL: Primary | ICD-10-CM

## 2022-05-26 DIAGNOSIS — M43.17 SPONDYLOLISTHESIS OF LUMBOSACRAL REGION: ICD-10-CM

## 2022-05-26 DIAGNOSIS — F17.200 SMOKER: ICD-10-CM

## 2022-05-26 PROCEDURE — 99024 PR POST-OP FOLLOW-UP VISIT: ICD-10-PCS | Mod: ,,, | Performed by: NEUROLOGICAL SURGERY

## 2022-05-26 PROCEDURE — 1159F PR MEDICATION LIST DOCUMENTED IN MEDICAL RECORD: ICD-10-PCS | Mod: CPTII,,, | Performed by: NEUROLOGICAL SURGERY

## 2022-05-26 PROCEDURE — 3008F BODY MASS INDEX DOCD: CPT | Mod: CPTII,,, | Performed by: NEUROLOGICAL SURGERY

## 2022-05-26 PROCEDURE — 99024 POSTOP FOLLOW-UP VISIT: CPT | Mod: ,,, | Performed by: NEUROLOGICAL SURGERY

## 2022-05-26 PROCEDURE — 3080F DIAST BP >= 90 MM HG: CPT | Mod: CPTII,,, | Performed by: NEUROLOGICAL SURGERY

## 2022-05-26 PROCEDURE — 99999 PR PBB SHADOW E&M-EST. PATIENT-LVL III: CPT | Mod: PBBFAC,,, | Performed by: NEUROLOGICAL SURGERY

## 2022-05-26 PROCEDURE — 1159F MED LIST DOCD IN RCRD: CPT | Mod: CPTII,,, | Performed by: NEUROLOGICAL SURGERY

## 2022-05-26 PROCEDURE — 3008F PR BODY MASS INDEX (BMI) DOCUMENTED: ICD-10-PCS | Mod: CPTII,,, | Performed by: NEUROLOGICAL SURGERY

## 2022-05-26 PROCEDURE — 72080 X-RAY EXAM THORACOLMB 2/> VW: CPT | Mod: TC

## 2022-05-26 PROCEDURE — 99213 OFFICE O/P EST LOW 20 MIN: CPT | Mod: PBBFAC | Performed by: NEUROLOGICAL SURGERY

## 2022-05-26 PROCEDURE — 72080 XR THORACOLUMBAR SPINE AP LATERAL: ICD-10-PCS | Mod: 26,,, | Performed by: RADIOLOGY

## 2022-05-26 PROCEDURE — 72080 X-RAY EXAM THORACOLMB 2/> VW: CPT | Mod: 26,,, | Performed by: RADIOLOGY

## 2022-05-26 PROCEDURE — 99999 PR PBB SHADOW E&M-EST. PATIENT-LVL III: ICD-10-PCS | Mod: PBBFAC,,, | Performed by: NEUROLOGICAL SURGERY

## 2022-05-26 PROCEDURE — 3080F PR MOST RECENT DIASTOLIC BLOOD PRESSURE >= 90 MM HG: ICD-10-PCS | Mod: CPTII,,, | Performed by: NEUROLOGICAL SURGERY

## 2022-05-26 PROCEDURE — 3075F PR MOST RECENT SYSTOLIC BLOOD PRESS GE 130-139MM HG: ICD-10-PCS | Mod: CPTII,,, | Performed by: NEUROLOGICAL SURGERY

## 2022-05-26 PROCEDURE — 3075F SYST BP GE 130 - 139MM HG: CPT | Mod: CPTII,,, | Performed by: NEUROLOGICAL SURGERY

## 2022-05-26 NOTE — PROGRESS NOTES
"Neurosurgery  Follow up    SUBJECTIVE:       History of Present Illness:  "Chris Mahmood is a 50 y.o. male with PMH of GERD, current smoker, and HTN. He is being seen in clinic today to discuss his concerns with progressive low back and bilateral leg pain. States that he has always struggled with his back due to his profession as a ; however, in 2015 the pain became so severe that it started to affect his ability to perform his work duties. He was recently laid off from his job as a result of the COVID pandemic. Denies trauma.      "Describes the pain as constant, aching, and stabbing across his low back bilaterally with radiation in the posterior aspect of his thighs L > right. States that intermittently with ambulation he experiences sharp shooting pain in anterior and lateral aspects of his legs primarily from the knees down. Rates the back pain as a 10/10 and the left leg as a 8/10 and the right leg as a 6/10. Aggravating factors include walking, prolonged standing, and lying down. Alleviating factors include sitting and bending forward to stretch. He ambulates without the use of assistive devices. Denies b/b dysfunction, saddle anesthesia, or gait instability. Reports weakness in the legs L > R as a result of the pain; as well as numbness and tingling primarily in the L5/S1 dermatome on the left when the pain is severe. The patient attempted PT, but was unable to continue the sessions due to the pain. He has an appointment later this month with pain management to discuss ANA."     As of 6/24/21, the patient reports that he sees pain management tomorrow for discussion of ANA. He has severe, debilitating pain in his left leg 99% of the time. His symptoms are now sever even when sitting down. He does get some relief with bending over, as over a shopping cart.      He denies bleeding, infectious, or anesthetic complications from his previous elbow surgery. He smokes 2-3 cigars daily, and he " "drinks a little moonshine.     As of 2/17/22, the patient reports that he has been getting ANA and RFA, which have been helpful, but the pain is "coming back with a vengeance." He states that he is unable to keep living with the current level of pain he's experiencing.     Both legs hurt, but the left leg has worse shooting pain. This is different than when he got his previous MRI.     He is still smoking cigars. His gastric reflux has been acting up.     As of 5/25/22, the patient underwent L3-pelvis fusion with L4-L5/L5-S1 TLIF on 4/6/22. He reports that he has been well. He is no longer smoking. He has had considerable improvement in his pain and ability to walk. He reports that he has hope for the future. He has had no fever, chills, or drainage from the incision.     Review of patient's allergies indicates:  No Known Allergies    Current Outpatient Medications   Medication Sig Dispense Refill    amLODIPine (NORVASC) 5 MG tablet once daily.      cyclobenzaprine (FLEXERIL) 10 MG tablet Take 10 mg by mouth 2 (two) times daily as needed.      diazePAM (VALIUM) 5 MG tablet Take 1 tablet (5 mg total) by mouth every 12 (twelve) hours as needed (spasms). 28 tablet 0    folic acid (FOLVITE) 400 MCG tablet Take 400 mcg by mouth once daily.      gabapentin (NEURONTIN) 300 MG capsule Take 300 mg by mouth 3 (three) times daily.      oxyCODONE-acetaminophen (PERCOCET) 5-325 mg per tablet Take 1 tablet by mouth every 6 (six) hours as needed for Pain. 28 tablet 0    pantoprazole (PROTONIX) 40 MG tablet Take 40 mg by mouth once daily.      tiZANidine (ZANAFLEX) 4 MG tablet Take 1-2 tablets (4-8 mg total) by mouth once daily. 60 tablet 0     No current facility-administered medications for this visit.       Past Medical History:   Diagnosis Date    GERD (gastroesophageal reflux disease)     Hypertension      Past Surgical History:   Procedure Laterality Date    ELBOW SURGERY Left 1994    [facility] in [place] "     FLEXIBLE CYSTOSCOPY  4/6/2022    Procedure: CYSTOSCOPY, FLEXIBLE with placement of miranda catheter;  Surgeon: Dominic Iverson MD;  Location: [address];  Service: Urology;;    FUSION OF POSTERIOR COLUMN OF LUMBAR SPINE USING COMPUTER-ASSISTED NAVIGATION  4/6/2022    Procedure: FUSION, SPINE, POSTERIOR SPINAL COLUMN, LUMBAR, USING COMPUTER-ASSISTED NAVIGATION;  Surgeon: Susanne Tiwari MD;  Location: [facility] OR [address] [place] [address];  Service: Neurosurgery;;  L3-iliac    LUMBAR LAMINECTOMY  4/6/2022    Procedure: LAMINECTOMY, SPINE, LUMBAR;  Surgeon: Susanne Tiwari MD;  Location: [facility] OR [address] [place] [address];  Service: Neurosurgery;;  L3-S1     Family History    None       Social History     Socioeconomic History    Marital status: Single   Tobacco Use    Smoking status: Current Every Day Smoker    Smokeless tobacco: Current User   Substance and Sexual Activity    Alcohol use: Yes     Comment: pt drinks moonshine daily    Drug use: No    Sexual activity: Yes     Partners: Female       Review of Systems  Constitutional: Negative for activity change, appetite change and fever.   HENT: Negative for hearing loss and postnasal drip.    Eyes: Negative for pain and visual disturbance.   Respiratory: Negative for shortness of breath.    Cardiovascular: Negative for chest pain and palpitations.   Gastrointestinal: Negative for abdominal pain.   Endocrine: Negative for cold intolerance, polydipsia, polyphagia and polyuria.   Genitourinary: Negative for difficulty urinating, frequency and urgency.   Musculoskeletal: Positive for back pain and myalgias. Negative for gait problem.   Skin: Negative for color change and wound.   Neurological: Positive for weakness and numbness. Negative for dizziness and headaches.   Hematological: Does not bruise/bleed easily.   Psychiatric/Behavioral: Negative for confusion and dysphoric mood. The patient is nervous/anxious.      OBJECTIVE:     Vital Signs     There is no height or weight on file to calculate  BMI.      Neurosurgery Physical Exam  General: well developed, well nourished, no distress.   Head: normocephalic, atraumatic  Neurologic: Alert and oriented. Thought content appropriate.  GCS: Motor: 6/Verbal: 5/Eyes: 4 GCS Total: 15  Mental Status: Awake, Alert, Oriented x 4  Language: No aphasia  Speech: No dysarthria  Cranial nerves: face symmetric, tongue midline, CN II-XII grossly intact.   Eyes: pupils equal, round, reactive to light with accomodation, EOMI.   Pulmonary: normal respirations, no signs of respiratory distress  Abdomen: soft, non-distended  Skin: Skin is warm, dry and intact.  Sensory: intact to light touch throughout  Motor Strength:Moves all extremities spontaneously with good tone. No abnormal movements seen.      Strength Exam Pain Limited  Strength   Deltoids Triceps Biceps Wrist Extension Wrist Flexion Hand    Upper: R 5/5 5/5 5/5 5/5 5/5 5/5     L 5/5 5/5 5/5 5/5 5/5 5/5       Iliopsoas Quadriceps Knee  Flexion Tibialis  anterior Gastro- cnemius EHL   Lower: R 5/5 5/5 5/5 5/5 5/5 5/5     L 4/5 4/5 5/5 5/5 5/5 4+/5      Reflexes:   DTR: 2+ symmetrically throughout.  Ware's: Negative.  Babinski's: Negative.  Clonus: Negative.     Cerebellar:   Finger-to-nose: intact bilaterally   Pronator drift: absent bilaterally  Gait stable      Cervical:   ROM: Full with flexion, extension, lateral rotation and ear-to-shoulder bend.   Midline TTP: Negative.  Lhermitte's: Negative.     Thoracic:  Midline TTP: Negative.     Lumbar:  Midline TTP: Mild    Incision well healed with edges opposed     Diagnostic Results:  Xrays show stable position of hardware     ASSESSMENT/PLAN:     Chris Mahmood is a 51 y.o. male with severe stenosis and dynamic instability and L4-L5/L5-S1 spondylolisthesis. He underwent L3-pelvis fusion with L4-L5/L5-S1 TLIF on 4/6/22. He has been doing well since surgery with improved pain and ability to walk.     I would like him to continue using the brace when he is out in  public, but he no longer needs to use it when he is at home. I would like him to continue with his admirable smoking cessation.     I would like to see him back in about 6 weeks with repeat xrays of the lumbar spine. I would like him to continue PT in interim. He can now lift up to 15 lbs. He should continue using bone growth stimulator     We also discussed that we will taper his narcotics off over the next 6 weeks. He and his significant other expressed understanding     We will plan for him to obtain CT lumbar spine about 6 months postop.      I have encouraged him to contact the clinic in interim with any questions, concerns, or adverse clinical change.

## 2022-06-16 DIAGNOSIS — Z98.1 STATUS POST LUMBAR SPINAL FUSION: Primary | ICD-10-CM

## 2022-06-16 DIAGNOSIS — Z98.1 STATUS POST LUMBAR SPINAL FUSION: ICD-10-CM

## 2022-06-16 RX ORDER — OXYCODONE AND ACETAMINOPHEN 5; 325 MG/1; MG/1
1 TABLET ORAL EVERY 6 HOURS PRN
Qty: 28 TABLET | Refills: 0 | Status: CANCELLED | OUTPATIENT
Start: 2022-06-16

## 2022-06-16 RX ORDER — OXYCODONE AND ACETAMINOPHEN 5; 325 MG/1; MG/1
1 TABLET ORAL EVERY 6 HOURS PRN
Qty: 28 TABLET | Refills: 0 | Status: SHIPPED | OUTPATIENT
Start: 2022-06-16 | End: 2022-07-07

## 2022-07-06 NOTE — PROGRESS NOTES
"Neurosurgery  Follow up    SUBJECTIVE:       History of Present Illness:  "Chris Mahmood is a 50 y.o. male with PMH of GERD, current smoker, and HTN. He is being seen in clinic today to discuss his concerns with progressive low back and bilateral leg pain. States that he has always struggled with his back due to his profession as a ; however, in 2015 the pain became so severe that it started to affect his ability to perform his work duties. He was recently laid off from his job as a result of the COVID pandemic. Denies trauma.      "Describes the pain as constant, aching, and stabbing across his low back bilaterally with radiation in the posterior aspect of his thighs L > right. States that intermittently with ambulation he experiences sharp shooting pain in anterior and lateral aspects of his legs primarily from the knees down. Rates the back pain as a 10/10 and the left leg as a 8/10 and the right leg as a 6/10. Aggravating factors include walking, prolonged standing, and lying down. Alleviating factors include sitting and bending forward to stretch. He ambulates without the use of assistive devices. Denies b/b dysfunction, saddle anesthesia, or gait instability. Reports weakness in the legs L > R as a result of the pain; as well as numbness and tingling primarily in the L5/S1 dermatome on the left when the pain is severe. The patient attempted PT, but was unable to continue the sessions due to the pain. He has an appointment later this month with pain management to discuss ANA."     As of 6/24/21, the patient reports that he sees pain management tomorrow for discussion of ANA. He has severe, debilitating pain in his left leg 99% of the time. His symptoms are now sever even when sitting down. He does get some relief with bending over, as over a shopping cart.      He denies bleeding, infectious, or anesthetic complications from his previous elbow surgery. He smokes 2-3 cigars daily, and he " "drinks a little moonshine.     As of 2/17/22, the patient reports that he has been getting ANA and RFA, which have been helpful, but the pain is "coming back with a vengeance." He states that he is unable to keep living with the current level of pain he's experiencing.     Both legs hurt, but the left leg has worse shooting pain. This is different than when he got his previous MRI.     He is still smoking cigars. His gastric reflux has been acting up.     As of 5/25/22, the patient underwent L3-pelvis fusion with L4-L5/L5-S1 TLIF on 4/6/22. He reports that he has been well. He is no longer smoking. He has had considerable improvement in his pain and ability to walk. He reports that he has hope for the future. He has had no fever, chills, or drainage from the incision.     As of 7/7/22, the patient reports he has been advancing his activities. He feels that he may be overdoing it at times. He is having some spasms in the proximal legs. This went away after therapy yesterday. He feels better when he does his exercises. He is off all narcotics.     He has still been wearing the brace. He is also using the stimulator.     Review of patient's allergies indicates:  No Known Allergies    Current Outpatient Medications   Medication Sig Dispense Refill    amLODIPine (NORVASC) 5 MG tablet once daily.      cyclobenzaprine (FLEXERIL) 10 MG tablet Take 10 mg by mouth 2 (two) times daily as needed.      diazePAM (VALIUM) 5 MG tablet Take 1 tablet (5 mg total) by mouth every 12 (twelve) hours as needed (spasms). 28 tablet 0    folic acid (FOLVITE) 400 MCG tablet Take 400 mcg by mouth once daily.      gabapentin (NEURONTIN) 300 MG capsule Take 300 mg by mouth 3 (three) times daily.      oxyCODONE-acetaminophen (PERCOCET) 5-325 mg per tablet Take 1 tablet by mouth every 6 (six) hours as needed for Pain. 28 tablet 0    pantoprazole (PROTONIX) 40 MG tablet Take 40 mg by mouth once daily.      tiZANidine (ZANAFLEX) 4 MG tablet " Take 1-2 tablets (4-8 mg total) by mouth once daily. 60 tablet 0     No current facility-administered medications for this visit.       Past Medical History:   Diagnosis Date    GERD (gastroesophageal reflux disease)     Hypertension      Past Surgical History:   Procedure Laterality Date    ELBOW SURGERY Left 1994    [facility] in [place]     FLEXIBLE CYSTOSCOPY  4/6/2022    Procedure: CYSTOSCOPY, FLEXIBLE with placement of miranda catheter;  Surgeon: Dominic Iverson MD;  Location: [address];  Service: Urology;;    FUSION OF POSTERIOR COLUMN OF LUMBAR SPINE USING COMPUTER-ASSISTED NAVIGATION  4/6/2022    Procedure: FUSION, SPINE, POSTERIOR SPINAL COLUMN, LUMBAR, USING COMPUTER-ASSISTED NAVIGATION;  Surgeon: Susanne Tiwari MD;  Location: [facility] OR [address] [place] [address];  Service: Neurosurgery;;  L3-iliac    LUMBAR LAMINECTOMY  4/6/2022    Procedure: LAMINECTOMY, SPINE, LUMBAR;  Surgeon: Susanne Tiwari MD;  Location: [facility] OR [address] [place] [address];  Service: Neurosurgery;;  L3-S1     Family History    None       Social History     Socioeconomic History    Marital status: Single   Tobacco Use    Smoking status: Current Every Day Smoker    Smokeless tobacco: Current User   Substance and Sexual Activity    Alcohol use: Yes     Comment: pt drinks moonshine daily    Drug use: No    Sexual activity: Yes     Partners: Female       Review of Systems  Constitutional: Negative for activity change, appetite change and fever.   HENT: Negative for hearing loss and postnasal drip.    Eyes: Negative for pain and visual disturbance.   Respiratory: Negative for shortness of breath.    Cardiovascular: Negative for chest pain and palpitations.   Gastrointestinal: Negative for abdominal pain.   Endocrine: Negative for cold intolerance, polydipsia, polyphagia and polyuria.   Genitourinary: Negative for difficulty urinating, frequency and urgency.   Musculoskeletal: Positive for back pain and myalgias. Negative for gait problem.   Skin:  Negative for color change and wound.   Neurological: Positive for weakness and numbness. Negative for dizziness and headaches.   Hematological: Does not bruise/bleed easily.   Psychiatric/Behavioral: Negative for confusion and dysphoric mood. The patient is nervous/anxious.      OBJECTIVE:     Vital Signs     There is no height or weight on file to calculate BMI.      Neurosurgery Physical Exam  General: well developed, well nourished, no distress.   Head: normocephalic, atraumatic  Neurologic: Alert and oriented. Thought content appropriate.  GCS: Motor: 6/Verbal: 5/Eyes: 4 GCS Total: 15  Mental Status: Awake, Alert, Oriented x 4  Language: No aphasia  Speech: No dysarthria  Cranial nerves: face symmetric, tongue midline, CN II-XII grossly intact.   Eyes: pupils equal, round, reactive to light with accomodation, EOMI.   Pulmonary: normal respirations, no signs of respiratory distress  Abdomen: soft, non-distended  Skin: Skin is warm, dry and intact.  Sensory: intact to light touch throughout  Motor Strength:Moves all extremities spontaneously with good tone. No abnormal movements seen.      Strength Exam Pain Limited  Strength   Deltoids Triceps Biceps Wrist Extension Wrist Flexion Hand    Upper: R 5/5 5/5 5/5 5/5 5/5 5/5     L 5/5 5/5 5/5 5/5 5/5 5/5       Iliopsoas Quadriceps Knee  Flexion Tibialis  anterior Gastro- cnemius EHL   Lower: R 5/5 5/5 5/5 5/5 5/5 5/5     L 4/5 4/5 5/5 5/5 5/5 5/5      Reflexes:   DTR: 2+ symmetrically throughout.  Ware's: Negative.  Babinski's: Negative.  Clonus: Negative.     Cerebellar:   Finger-to-nose: intact bilaterally   Pronator drift: absent bilaterally  Gait stable      Cervical:   ROM: Full with flexion, extension, lateral rotation and ear-to-shoulder bend.   Midline TTP: Negative.  Lhermitte's: Negative.     Thoracic:  Midline TTP: Negative.     Lumbar:  Midline TTP: Mild    Incision well healed     Diagnostic Results:  Xrays show stable position of hardware      ASSESSMENT/PLAN:     Chris Mahmood is a 51 y.o. male with severe stenosis and dynamic instability and L4-L5/L5-S1 spondylolisthesis. He underwent L3-pelvis fusion with L4-L5/L5-S1 TLIF on 4/6/22. He has been doing well since surgery with improved pain and ability to walk.     He no longer needs to use the brace. He should continue use of the bone growth stimulator.     I would like to see him back in about 3 months with CT of the lumbar spine. I would like him to continue PT in interim and will be happy to reauthorize if needed. We discussed the importance of continuing daily exercises (homework) at home.  He can now lift up to 20 lbs. He should continue using bone growth stimulator      I have encouraged him to contact the clinic in interim with any questions, concerns, or adverse clinical change.

## 2022-07-07 ENCOUNTER — HOSPITAL ENCOUNTER (OUTPATIENT)
Dept: RADIOLOGY | Facility: HOSPITAL | Age: 52
Discharge: HOME | End: 2022-07-07
Attending: NEUROLOGICAL SURGERY
Payer: MEDICAID

## 2022-07-07 ENCOUNTER — OFFICE VISIT (OUTPATIENT)
Dept: NEUROSURGERY | Facility: CLINIC | Age: 52
End: 2022-07-07
Payer: MEDICAID

## 2022-07-07 DIAGNOSIS — M43.16 SPONDYLOLISTHESIS AT L4-L5 LEVEL: ICD-10-CM

## 2022-07-07 DIAGNOSIS — Z98.1 STATUS POST LUMBAR SPINAL FUSION: Primary | ICD-10-CM

## 2022-07-07 PROCEDURE — 72100 XR LUMBAR SPINE AP AND LATERAL: ICD-10-PCS | Mod: 26,,, | Performed by: RADIOLOGY

## 2022-07-07 PROCEDURE — 99999 PR PBB SHADOW E&M-EST. PATIENT-LVL II: ICD-10-PCS | Mod: PBBFAC,,, | Performed by: NEUROLOGICAL SURGERY

## 2022-07-07 PROCEDURE — 99214 PR OFFICE/OUTPT VISIT, EST, LEVL IV, 30-39 MIN: ICD-10-PCS | Mod: S$PBB,,, | Performed by: NEUROLOGICAL SURGERY

## 2022-07-07 PROCEDURE — 99214 OFFICE O/P EST MOD 30 MIN: CPT | Mod: S$PBB,,, | Performed by: NEUROLOGICAL SURGERY

## 2022-07-07 PROCEDURE — 72100 X-RAY EXAM L-S SPINE 2/3 VWS: CPT | Mod: TC

## 2022-07-07 PROCEDURE — 99212 OFFICE O/P EST SF 10 MIN: CPT | Mod: PBBFAC | Performed by: NEUROLOGICAL SURGERY

## 2022-07-07 PROCEDURE — 99999 PR PBB SHADOW E&M-EST. PATIENT-LVL II: CPT | Mod: PBBFAC,,, | Performed by: NEUROLOGICAL SURGERY

## 2022-07-07 PROCEDURE — 72100 X-RAY EXAM L-S SPINE 2/3 VWS: CPT | Mod: 26,,, | Performed by: RADIOLOGY

## 2022-07-19 ENCOUNTER — TELEPHONE (OUTPATIENT)
Dept: NEUROSURGERY | Facility: CLINIC | Age: 52
End: 2022-07-19
Payer: MEDICAID

## 2022-07-19 NOTE — TELEPHONE ENCOUNTER
The pt would like a referral to PM for soreness. He is not back working full time and is having a hard time finding comfort over all. He is complaining SI pain. No where near the pain he had before surgery which he states has gotten better. The pt is already est with outside PM . He will seek injections and PM care through them.  CEE loomis   ----- Message from Don Brush sent at 7/19/2022  3:08 PM CDT -----  Regarding: call bk about pain mgmt      The Pt states that he would like a call back about the pain mgmt due to the pain/soreness he has everyday. The Pt states that he will feel ok after he therapy- but a couple hours later he is sore and in pain again and the ibuprofen isn't helping relieve any of it.    Please contact the Pt to discuss.     # [phone]

## 2022-08-31 PROBLEM — R29.898 WEAKNESS OF BOTH LOWER EXTREMITIES: Status: RESOLVED | Noted: 2022-04-14 | Resolved: 2022-08-31

## 2022-08-31 PROBLEM — M62.81 WEAKNESS OF TRUNK MUSCULATURE: Status: RESOLVED | Noted: 2022-04-14 | Resolved: 2022-08-31

## 2022-08-31 PROBLEM — M25.69 DECREASED RANGE OF MOTION OF TRUNK AND BACK: Status: RESOLVED | Noted: 2022-04-14 | Resolved: 2022-08-31

## 2022-10-10 ENCOUNTER — TELEPHONE (OUTPATIENT)
Dept: NEUROSURGERY | Facility: CLINIC | Age: 52
End: 2022-10-10
Payer: MEDICAID

## 2022-10-21 ENCOUNTER — HOSPITAL ENCOUNTER (OUTPATIENT)
Dept: RADIOLOGY | Facility: HOSPITAL | Age: 52
Discharge: HOME | End: 2022-10-21
Attending: NEUROLOGICAL SURGERY
Payer: MEDICAID

## 2022-10-21 DIAGNOSIS — Z98.1 STATUS POST LUMBAR SPINAL FUSION: ICD-10-CM

## 2022-10-21 PROCEDURE — 72131 CT LUMBAR SPINE W/O DYE: CPT | Mod: TC

## 2022-10-21 PROCEDURE — 72131 CT LUMBAR SPINE WITHOUT CONTRAST: ICD-10-PCS | Mod: 26,,, | Performed by: RADIOLOGY

## 2022-10-21 PROCEDURE — 72131 CT LUMBAR SPINE W/O DYE: CPT | Mod: 26,,, | Performed by: RADIOLOGY

## 2022-11-01 ENCOUNTER — TELEPHONE (OUTPATIENT)
Dept: NEUROSURGERY | Facility: CLINIC | Age: 52
End: 2022-11-01
Payer: MEDICAID

## 2022-11-01 NOTE — TELEPHONE ENCOUNTER
----- Message from Aisha Rodríguez MA sent at 10/18/2022  8:27 AM CDT -----  Regarding: appt reschedule/ medicaid  Contact: Patient  Can you please assist with scheduling, pt no showed appt with Debbie yesterday  ----- Message -----  From: Jose Parnell  Sent: 10/18/2022   7:43 AM CDT  To: Rigoberto Lewis Staff    The pt called and would like to have a nurse call him back    He needs to reschedule an appt    The pt can be reached at [phone]

## 2023-03-06 ENCOUNTER — TELEPHONE (OUTPATIENT)
Dept: NEUROSURGERY | Facility: CLINIC | Age: 53
End: 2023-03-06
Payer: MEDICAID

## 2023-03-06 DIAGNOSIS — Z98.1 STATUS POST LUMBAR SPINAL FUSION: Primary | ICD-10-CM

## 2023-03-13 ENCOUNTER — TELEPHONE (OUTPATIENT)
Dept: NEUROSURGERY | Facility: CLINIC | Age: 53
End: 2023-03-13
Payer: MEDICAID

## 2023-03-13 ENCOUNTER — OFFICE VISIT (OUTPATIENT)
Dept: NEUROSURGERY | Facility: CLINIC | Age: 53
End: 2023-03-13
Payer: MEDICAID

## 2023-03-13 ENCOUNTER — HOSPITAL ENCOUNTER (OUTPATIENT)
Dept: RADIOLOGY | Facility: HOSPITAL | Age: 53
Discharge: HOME | End: 2023-03-13
Attending: NEUROLOGICAL SURGERY
Payer: MEDICAID

## 2023-03-13 VITALS
SYSTOLIC BLOOD PRESSURE: 130 MMHG | HEIGHT: 70 IN | DIASTOLIC BLOOD PRESSURE: 86 MMHG | WEIGHT: 256 LBS | BODY MASS INDEX: 36.65 KG/M2 | HEART RATE: 69 BPM

## 2023-03-13 DIAGNOSIS — Z98.1 STATUS POST LUMBAR SPINAL FUSION: Primary | ICD-10-CM

## 2023-03-13 DIAGNOSIS — Z72.0 TOBACCO USE: ICD-10-CM

## 2023-03-13 DIAGNOSIS — M43.16 SPONDYLOLISTHESIS AT L4-L5 LEVEL: ICD-10-CM

## 2023-03-13 DIAGNOSIS — Z98.1 STATUS POST LUMBAR SPINAL FUSION: ICD-10-CM

## 2023-03-13 DIAGNOSIS — M43.17 SPONDYLOLISTHESIS OF LUMBOSACRAL REGION: ICD-10-CM

## 2023-03-13 DIAGNOSIS — M54.9 DORSALGIA, UNSPECIFIED: ICD-10-CM

## 2023-03-13 DIAGNOSIS — F17.200 SMOKER: Primary | ICD-10-CM

## 2023-03-13 PROCEDURE — 72131 CT LUMBAR SPINE W/O DYE: CPT | Mod: TC

## 2023-03-13 PROCEDURE — 99215 OFFICE O/P EST HI 40 MIN: CPT | Mod: S$PBB,,,

## 2023-03-13 PROCEDURE — 1159F PR MEDICATION LIST DOCUMENTED IN MEDICAL RECORD: ICD-10-PCS | Mod: CPTII,,,

## 2023-03-13 PROCEDURE — 3075F PR MOST RECENT SYSTOLIC BLOOD PRESS GE 130-139MM HG: ICD-10-PCS | Mod: CPTII,,,

## 2023-03-13 PROCEDURE — 1159F MED LIST DOCD IN RCRD: CPT | Mod: CPTII,,,

## 2023-03-13 PROCEDURE — 99215 PR OFFICE/OUTPT VISIT, EST, LEVL V, 40-54 MIN: ICD-10-PCS | Mod: S$PBB,,,

## 2023-03-13 PROCEDURE — 72131 CT LUMBAR SPINE W/O DYE: CPT | Mod: 26,,, | Performed by: INTERNAL MEDICINE

## 2023-03-13 PROCEDURE — 3079F DIAST BP 80-89 MM HG: CPT | Mod: CPTII,,,

## 2023-03-13 PROCEDURE — 99999 PR PBB SHADOW E&M-EST. PATIENT-LVL IV: CPT | Mod: PBBFAC,,,

## 2023-03-13 PROCEDURE — 3075F SYST BP GE 130 - 139MM HG: CPT | Mod: CPTII,,,

## 2023-03-13 PROCEDURE — 99999 PR PBB SHADOW E&M-EST. PATIENT-LVL IV: ICD-10-PCS | Mod: PBBFAC,,,

## 2023-03-13 PROCEDURE — 99214 OFFICE O/P EST MOD 30 MIN: CPT | Mod: PBBFAC,25

## 2023-03-13 PROCEDURE — 72131 CT LUMBAR SPINE WITHOUT CONTRAST: ICD-10-PCS | Mod: 26,,, | Performed by: INTERNAL MEDICINE

## 2023-03-13 PROCEDURE — 3008F BODY MASS INDEX DOCD: CPT | Mod: CPTII,,,

## 2023-03-13 PROCEDURE — 3008F PR BODY MASS INDEX (BMI) DOCUMENTED: ICD-10-PCS | Mod: CPTII,,,

## 2023-03-13 PROCEDURE — 3079F PR MOST RECENT DIASTOLIC BLOOD PRESSURE 80-89 MM HG: ICD-10-PCS | Mod: CPTII,,,

## 2023-03-13 RX ORDER — BACLOFEN 10 MG/1
10 TABLET ORAL 3 TIMES DAILY
COMMUNITY
Start: 2022-10-13

## 2023-03-13 RX ORDER — SERTRALINE HYDROCHLORIDE 25 MG/1
25 TABLET, FILM COATED ORAL
COMMUNITY
Start: 2022-10-10

## 2023-03-13 RX ORDER — IBUPROFEN 800 MG/1
800 TABLET ORAL 3 TIMES DAILY
COMMUNITY
Start: 2023-03-08

## 2023-03-13 RX ORDER — ERGOCALCIFEROL 1.25 MG/1
50000 CAPSULE ORAL
COMMUNITY
Start: 2022-10-08

## 2023-03-13 RX ORDER — ROSUVASTATIN CALCIUM 10 MG/1
10 TABLET, COATED ORAL NIGHTLY
COMMUNITY
Start: 2023-01-03

## 2023-03-13 RX ORDER — TRAZODONE HYDROCHLORIDE 50 MG/1
50 TABLET ORAL NIGHTLY
COMMUNITY
Start: 2023-02-03

## 2023-03-13 RX ORDER — OXYCODONE AND ACETAMINOPHEN 5; 325 MG/1; MG/1
1 TABLET ORAL 2 TIMES DAILY PRN
COMMUNITY
Start: 2023-03-10

## 2023-03-13 RX ORDER — SILDENAFIL 50 MG/1
TABLET, FILM COATED ORAL
COMMUNITY

## 2023-03-13 RX ORDER — LIDOCAINE 36 MG/1
1 PATCH TOPICAL
COMMUNITY
Start: 2023-03-09

## 2023-03-23 NOTE — PROGRESS NOTES
[facility]  Neurosurgery    SUBJECTIVE:   History of Present Illness:  Chris Mahmood is a 52 y.o. male s/p L3-pelvis fusion with L4-L5/L5-S1 TLIF on 4/6/22 with Dr. Tiwari for severe lumbar stenosis and dynamic instability at L4-S1. The patient states he continues to have low back pain worse with movements such as bending over or prolonged activities such as when he has a long work day. 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 He does not have any pain with walking. He does not feel as if the pain is getting better or getting worse. He denies lower extremity symptoms, paraesthesias, focal weakness, bowel/bladder dysfunction. He continues to smoke cigarettes. He was also taking Ibuprofen 800 MG daily  but recently stopped due to his GERD. He continues to take Norco 5 MG, Gabapentin and Flexeril. He desires to start PT/OT again. He also is interested in the smoking cessation program. He has a bone growth stimulator but stopped using it.       Review of patient's allergies indicates:  No Known Allergies    Current Outpatient Medications:     amLODIPine (NORVASC) 5 MG tablet, once daily., Disp: , Rfl:     baclofen (LIORESAL) 10 MG tablet, Take 10 mg by mouth 3 (three) times daily., Disp: , Rfl:     gabapentin (NEURONTIN) 300 MG capsule, Take 300 mg by mouth 3 (three) times daily., Disp: , Rfl:     oxyCODONE-acetaminophen (PERCOCET) 5-325 mg per tablet, Take 1 tablet by mouth 2 (two) times daily as needed., Disp: , Rfl:     pantoprazole (PROTONIX) 40 MG tablet, Take 40 mg by mouth once daily., Disp: , Rfl:     rosuvastatin (CRESTOR) 10 MG tablet, Take 10 mg by mouth every evening., Disp: , Rfl:     sertraline (ZOLOFT) 25 MG tablet, Take 25 mg by mouth., Disp: , Rfl:     tiZANidine (ZANAFLEX) 4 MG tablet, Take 1-2 tablets (4-8 mg total) by mouth once daily., Disp: 60 tablet, Rfl: 0    ZTLIDO 1.8 % PtMd, Apply 1  patch topically., Disp: , Rfl:     cyclobenzaprine (FLEXERIL) 10 MG tablet, Take 10 mg by mouth 2 (two) times daily as needed., Disp: , Rfl:     ergocalciferol (ERGOCALCIFEROL) 50,000 unit Cap, Take 50,000 Units by mouth every 7 days., Disp: , Rfl:     folic acid (FOLVITE) 400 MCG tablet, Take 400 mcg by mouth once daily., Disp: , Rfl:     ibuprofen (ADVIL,MOTRIN) 800 MG tablet, Take 800 mg by mouth 3 (three) times daily., Disp: , Rfl:     sildenafiL (VIAGRA) 50 MG tablet, sildenafil 50 mg tablet  Take 1 tab once a day as needed 1 hour before sexual activity, Disp: , Rfl:     traZODone (DESYREL) 50 MG tablet, Take 50 mg by mouth every evening., Disp: , Rfl:      Past Medical History:   Diagnosis Date    GERD (gastroesophageal reflux disease)     Hypertension      Past Surgical History:   Procedure Laterality Date    ELBOW SURGERY Left 1994    [facility] in [place]     FLEXIBLE CYSTOSCOPY  4/6/2022    Procedure: CYSTOSCOPY, FLEXIBLE with placement of miranda catheter;  Surgeon: Dominic Iverson MD;  Location: [address];  Service: Urology;;    FUSION OF POSTERIOR COLUMN OF LUMBAR SPINE USING COMPUTER-ASSISTED NAVIGATION  4/6/2022    Procedure: FUSION, SPINE, POSTERIOR SPINAL COLUMN, LUMBAR, USING COMPUTER-ASSISTED NAVIGATION;  Surgeon: Susanne Tiwari MD;  Location: [address];  Service: Neurosurgery;;  L3-iliac    LUMBAR LAMINECTOMY  4/6/2022    Procedure: LAMINECTOMY, SPINE, LUMBAR;  Surgeon: Susanne Tiwari MD;  Location: [address];  Service: Neurosurgery;;  L3-S1     History reviewed. No pertinent family history.  Social History     Tobacco Use    Smoking status: Every Day    Smokeless tobacco: Current   Substance Use Topics    Alcohol use: Yes     Comment: pt drinks moonshine daily    Drug use: No       Review of Systems:  Constitutional: No fever or chills. No fatigue.  Eyes: No visual changes.  ENT: No nasal congestion or sore throat. No trouble swallowing.  Respiratory: No cough or  shortness of breath.  Cardiovascular: No chest pain or palpitations.  Gastrointestinal: No nausea or vomiting, tolerating diet.  Genitourinary: No hematuria or dysuria.  Skin: No rash or pruritis.  Hematologic/Lymphatic: No easy bruising or lymphadenopathy.  Musculoskeletal: No arthralgias, myalgias or weakness.  Neurological: No seizures or tremors. No focal weakness. No dizziness.  Behavioral/Psych: No auditory or visual hallucinations. No SI/HI.  Endocrine: No heat or cold intolerance.    OBJECTIVE:     Vital Signs (Most Recent):  Pulse: 69 (03/13/23 1439)  BP: 130/86 (03/13/23 1439)    Physical Exam:  General: Well developed, well nourished, no distress.  Head: Normocephalic, atraumatic.  Neurologic: Alert and oriented. Thought content appropriate  GCS: Motor: 6/Verbal: 5/Eyes: 4 GCS Total: 15  Mental Status: Awake, Alert, Oriented x 4.  Language: No aphasia.  Speech: No dysarthria.  Cranial nerves: Face symmetric, tongue midline, CN II-XII grossly intact.   Eyes: Pupils equal, round, reactive to light with accommodation, EOMI.   Pulmonary: Normal respirations, not labored, no accessory muscles used.  Abdomen: Soft, non-distended, not tender to palpation.  Sensory: Intact to light touch throughout.  Motor Strength: Moves all extremities spontaneously with good tone. Full strength upper and lower extremities. No abnormal movements seen.     Strength  Deltoids Triceps Biceps Wrist Extension Wrist Flexion Hand    Upper: R 5/5 5/5 5/5 5/5 5/5 5/5    L 5/5 5/5 5/5 5/5 5/5 5/5     Iliopsoas Quadriceps Knee  Flexion Tibialis  anterior Gastro- cnemius EHL   Lower: R 4/5 5/5 5/5 5/5 5/5 5/5    L 4/5 5/5 5/5 5/5 5/5 5/5     Vascular: Pulses 2+ and symmetric radial and dorsalis pedis. No LE edema or skin changes.  Skin: Warm, dry and intact, no rashes.  Gait: Normal.      Laboratory:  I have reviewed all pertinent lab results within the past 24 hours.    Diagnostic Results:  CT Lumbar spine w/o contrast 3/13/23:  Lucency present around the L3 screws. Hardware intact and in stable positioning otherwise. Some posterior fusion present.     ASSESSMENT/PLAN:     Chris Mahmood is a 52 y.o. male s/p L3-pelvis fusion with L4-L5/L5-S1 TLIF on 4/6/22. Patient continues to have low back pain. I have encourage the patient to join the smoking cessation program as tobacco use hinders bony fusion. I have advised to continue avoiding NSAIDs as this hinders bony fusion. The patient should start using his bone growth stimulator as well. I will also reauthorize his PT/OT. I would like the patient to RTC in 6-8 weeks with a repeat CT Lumbar spine to ensure no hardware failure and to assess for continued fusion.     All symptoms and signs that require emergent or urgent treatment were reviewed. The plan was discussed with the patient. All of the the patient's questions and concerns were answered and he voiced understanding. Please feel free to call with any further questions.     Time spent on this encounter: 52 minutes. This includes face-to-face time and non-face to face time preparing to see the patient (eg, review of tests), obtaining and/or reviewing separately obtained history, documenting clinical information in the electronic or other health record, independently interpreting results and communicating results to the patient/family/caregiver, or care coordinator.    Miroslava Tomas PA-C  Neurosurgery   [facility]

## 2023-03-29 ENCOUNTER — CLINICAL SUPPORT (OUTPATIENT)
Dept: SMOKING CESSATION | Facility: CLINIC | Age: 53
End: 2023-03-29

## 2023-03-29 DIAGNOSIS — F17.200 NICOTINE DEPENDENCE: Primary | ICD-10-CM

## 2023-03-29 PROCEDURE — 99404 PREV MED CNSL INDIV APPRX 60: CPT | Mod: S$GLB,,,

## 2023-03-29 PROCEDURE — 99404 PR PREVENT COUNSEL,INDIV,60 MIN: ICD-10-PCS | Mod: S$GLB,,,

## 2023-03-29 RX ORDER — DM/P-EPHED/ACETAMINOPH/DOXYLAM 30-7.5/3
2 LIQUID (ML) ORAL
Qty: 144 LOZENGE | Refills: 0 | Status: SHIPPED | OUTPATIENT
Start: 2023-03-29

## 2023-03-29 RX ORDER — IBUPROFEN 200 MG
1 TABLET ORAL DAILY
Qty: 14 PATCH | Refills: 0 | Status: SHIPPED | OUTPATIENT
Start: 2023-03-29

## 2023-03-29 NOTE — Clinical Note
Patient was seen in clinic today for intake.  Patient smoked 1 ppd for the first 19 years and chewed tobacco off and on during the first 19 years.  Patient switched to 2 cigarillos per day 5 years ago. Patient's CO monitor was 28 ppm.  Patient will begin the prescribed tobacco cessation medication regimen of  21 mg nicotine patch and 2 mg nicotine lozenges.  Completion of TCRS (Tobacco Cessation Rating Scale) reviewed learned addiction, model, personal reasons for quitting, medications, and goals.   Prescribed medication management will be by physician.  Patient will continue with sessions and medication monitoring by CTTS.

## 2023-04-12 ENCOUNTER — TELEPHONE (OUTPATIENT)
Dept: SMOKING CESSATION | Facility: CLINIC | Age: 53
End: 2023-04-12
Payer: MEDICAID

## 2023-04-12 NOTE — TELEPHONE ENCOUNTER
Smoking Cessation Clinic-called patient regarding missed follow up appointment.  Patient states that he forgot.  Patient is rescheduled on 04- at 9:00 AM.

## 2023-04-13 ENCOUNTER — CLINICAL SUPPORT (OUTPATIENT)
Dept: SMOKING CESSATION | Facility: CLINIC | Age: 53
End: 2023-04-13
Payer: COMMERCIAL

## 2023-04-13 DIAGNOSIS — F17.200 NICOTINE DEPENDENCE: Primary | ICD-10-CM

## 2023-04-13 PROCEDURE — 99402 PR PREVENT COUNSEL,INDIV,30 MIN: ICD-10-PCS | Mod: S$GLB,,,

## 2023-04-13 PROCEDURE — 99402 PREV MED CNSL INDIV APPRX 30: CPT | Mod: S$GLB,,,

## 2023-04-13 NOTE — PROGRESS NOTES
Individual Follow-Up Form    4/13/2023    Quit Date: 04-    Clinical Status of Patient: Outpatient    Continuing Medication: no     Target Symptoms: Withdrawal and medication side effects. The following were  rated moderate (3) to severe (4) on TCRS:  Moderate (3): none  Severe (4): none      Comments: Patient was seen in clinic today for follow up.  Patient states that he remains tobacco free since April 05, 2023.  Commended patient on his accomplishment thus far.  Patient's CO monitor was 11 ppm.  Patient also states that he did not start NRTs regimen and does not feel that he needs NRTs to quit.  Patient states that he is determined not to go back to smoking.  Patient reports his temptation increased when he drank alcohol.  Encouraged patient to use NRTs.  Reviewed strategies, cues, and triggers. Introduced the negative impact of tobacco on health, the health advantages of discontinuing the use of tobacco, timeline improved health changes after a quit, withdrawal issues to expect from nicotine and habit, and ways to achieve the of a quit.  The patient denies any abnormal behavior or mental changes at this time.  Patient will continue bi weekly sessions.     Diagnosis: F17.200    Next Visit: 2 weeks

## 2023-04-13 NOTE — Clinical Note
Patient was seen in clinic today for follow up.  Patient states that he remains tobacco free since April 05, 2023.  Commended patient on his accomplishment thus far.  Patient's CO monitor was 11 ppm.  Patient also states that he did not start NRTs regimen and does not feel that he needs NRTs to quit.  Patient states that he is determined not to go back to smoking.  Patient reports his temptation increased when he drank alcohol.  Encouraged patient to use NRTs.  Reviewed strategies, cues, and triggers. Introduced the negative impact of tobacco on health, the health advantages of discontinuing the use of tobacco, timeline improved health changes after a quit, withdrawal issues to expect from nicotine and habit, and ways to achieve the of a quit.  The patient denies any abnormal behavior or mental changes at this time.  Patient will continue bi weekly sessions.

## 2023-04-27 ENCOUNTER — TELEPHONE (OUTPATIENT)
Dept: SMOKING CESSATION | Facility: CLINIC | Age: 53
End: 2023-04-27
Payer: MEDICAID

## 2023-04-27 NOTE — TELEPHONE ENCOUNTER
Smoking Cessation Clinic-called patient regarding missed follow up appointment.  Patient stated not feeling well.  Patient is rescheduled on 05- at 10:00 AM.

## 2023-05-01 ENCOUNTER — TELEPHONE (OUTPATIENT)
Dept: SMOKING CESSATION | Facility: CLINIC | Age: 53
End: 2023-05-01
Payer: MEDICAID

## 2023-05-01 NOTE — TELEPHONE ENCOUNTER
Smoking Cessation Clinic-called patient regarding missed follow up appointment, no answer.  Left message and contact number was given.

## 2023-09-26 ENCOUNTER — CLINICAL SUPPORT (OUTPATIENT)
Dept: SMOKING CESSATION | Facility: CLINIC | Age: 53
End: 2023-09-26

## 2023-09-26 DIAGNOSIS — F17.200 NICOTINE DEPENDENCE: Primary | ICD-10-CM

## 2023-09-26 PROCEDURE — 99999 PR PBB SHADOW E&M-EST. PATIENT-LVL I: CPT | Mod: PBBFAC,,,

## 2023-09-26 PROCEDURE — 99407 PR TOBACCO USE CESSATION INTENSIVE >10 MINUTES: ICD-10-PCS | Mod: S$GLB,,,

## 2023-09-26 PROCEDURE — 99999 PR PBB SHADOW E&M-EST. PATIENT-LVL I: ICD-10-PCS | Mod: PBBFAC,,,

## 2023-09-26 PROCEDURE — 99407 BEHAV CHNG SMOKING > 10 MIN: CPT | Mod: S$GLB,,,

## 2023-09-26 NOTE — PROGRESS NOTES
Spoke with patient today in regard to smoking cessation progress for 6 month telephone follow up, he states tobacco free. Commended patient on the accomplishment thus far. Patient states quitting before he started the program by using willpower. Informed patient of benefit period, future follow ups, and contact information if any further help or support is needed. Will complete smart form for 3 and 6 month follow up on Quit attempt #1.

## 2023-10-12 ENCOUNTER — TELEPHONE (OUTPATIENT)
Dept: NEUROSURGERY | Facility: CLINIC | Age: 53
End: 2023-10-12

## 2023-10-12 NOTE — TELEPHONE ENCOUNTER
Spoke w/ pt. Pt could not afford cost of CT and Fu visit. Pt recently got new insurance ([employer]) and would like to reschedule appt for after Nov 1 when new insurance kicks in. Will ask if we take insurance and c/b when advised.

## 2023-10-12 NOTE — TELEPHONE ENCOUNTER
Spoke w. Pt. Gave pt office phone number so he can get his insurance updated. Pt said he would do this today.

## 2023-10-17 ENCOUNTER — TELEPHONE (OUTPATIENT)
Dept: NEUROSURGERY | Facility: CLINIC | Age: 53
End: 2023-10-17

## 2023-10-17 NOTE — TELEPHONE ENCOUNTER
Rescheduled appt that pt missed w/ Thom. Pt stated that new insurance coverage will start Nov 1st. PT HASN'T UPDATED INSURANCE IN CHART. advised PT TO come to imaging early to update insurance.

## 2023-11-09 ENCOUNTER — OFFICE VISIT (OUTPATIENT)
Dept: NEUROSURGERY | Facility: CLINIC | Age: 53
End: 2023-11-09
Payer: MEDICARE

## 2023-11-09 ENCOUNTER — HOSPITAL ENCOUNTER (OUTPATIENT)
Dept: RADIOLOGY | Facility: HOSPITAL | Age: 53
Discharge: HOME | End: 2023-11-09
Payer: MEDICARE

## 2023-11-09 DIAGNOSIS — Z72.0 TOBACCO USE: ICD-10-CM

## 2023-11-09 DIAGNOSIS — Z98.1 STATUS POST LUMBAR SPINAL FUSION: Primary | ICD-10-CM

## 2023-11-09 DIAGNOSIS — M43.17 SPONDYLOLISTHESIS OF LUMBOSACRAL REGION: ICD-10-CM

## 2023-11-09 DIAGNOSIS — M54.9 DORSALGIA, UNSPECIFIED: ICD-10-CM

## 2023-11-09 PROCEDURE — 72131 CT LUMBAR SPINE W/O DYE: CPT | Mod: TC

## 2023-11-09 PROCEDURE — 72131 CT LUMBAR SPINE WITHOUT CONTRAST: ICD-10-PCS | Mod: 26,,, | Performed by: RADIOLOGY

## 2023-11-09 PROCEDURE — 72131 CT LUMBAR SPINE W/O DYE: CPT | Mod: 26,,, | Performed by: RADIOLOGY

## 2023-11-09 PROCEDURE — 99215 OFFICE O/P EST HI 40 MIN: CPT | Mod: S$GLB,,,

## 2023-11-09 PROCEDURE — 99999 PR PBB SHADOW E&M-EST. PATIENT-LVL III: CPT | Mod: PBBFAC,,,

## 2023-11-09 PROCEDURE — 1159F MED LIST DOCD IN RCRD: CPT | Mod: CPTII,S$GLB,,

## 2023-11-09 RX ORDER — OXYCODONE HYDROCHLORIDE 10 MG/1
10 TABLET ORAL EVERY 4 HOURS PRN
COMMUNITY

## 2023-12-12 NOTE — PROGRESS NOTES
[facility]  Neurosurgery    SUBJECTIVE:   History of Present Illness:  3/13/23: Chris Mahmood is a 52 y.o. male s/p L3-pelvis fusion with L4-L5/L5-S1 TLIF on 4/6/22 with Dr. Tiwari for severe lumbar stenosis and dynamic instability at L4-S1. The patient states he continues to have low back pain worse with movements such as bending over or prolonged activities such as when he has a long work day. 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 He does not have any pain with walking. He does not feel as if the pain is getting better or getting worse. He denies lower extremity symptoms, paraesthesias, focal weakness, bowel/bladder dysfunction. He continues to smoke cigarettes. He was also taking Ibuprofen 800 MG daily  but recently stopped due to his GERD. He continues to take Norco 5 MG, Gabapentin and Flexeril. He desires to start PT/OT again. He also is interested in the smoking cessation program. He has a bone growth stimulator but stopped using it.     Interval h/o 11/9/23:   Patient presents in f/u. No significant changes since last visit. Continued low back pain worsened after strenuous activity or long work days. No new concerning symptoms. He is seeing pain management. Patient continues to smoke.    Review of patient's allergies indicates:  No Known Allergies    Current Outpatient Medications:     amLODIPine (NORVASC) 5 MG tablet, once daily., Disp: , Rfl:     baclofen (LIORESAL) 10 MG tablet, Take 10 mg by mouth 3 (three) times daily., Disp: , Rfl:     ergocalciferol (ERGOCALCIFEROL) 50,000 unit Cap, Take 50,000 Units by mouth every 7 days., Disp: , Rfl:     gabapentin (NEURONTIN) 300 MG capsule, Take 300 mg by mouth 3 (three) times daily., Disp: , Rfl:     oxyCODONE (ROXICODONE) 10 mg Tab immediate release tablet, Take 10 mg by mouth every 4 (four) hours as needed for Pain., Disp: , Rfl:     pantoprazole  (PROTONIX) 40 MG tablet, Take 40 mg by mouth once daily., Disp: , Rfl:     rosuvastatin (CRESTOR) 10 MG tablet, Take 10 mg by mouth every evening., Disp: , Rfl:     sertraline (ZOLOFT) 25 MG tablet, Take 25 mg by mouth., Disp: , Rfl:     sildenafiL (VIAGRA) 50 MG tablet, sildenafil 50 mg tablet  Take 1 tab once a day as needed 1 hour before sexual activity, Disp: , Rfl:     ZTLIDO 1.8 % PtMd, Apply 1 patch topically., Disp: , Rfl:     cyclobenzaprine (FLEXERIL) 10 MG tablet, Take 10 mg by mouth 2 (two) times daily as needed., Disp: , Rfl:     folic acid (FOLVITE) 400 MCG tablet, Take 400 mcg by mouth once daily., Disp: , Rfl:     ibuprofen (ADVIL,MOTRIN) 800 MG tablet, Take 800 mg by mouth 3 (three) times daily., Disp: , Rfl:     nicotine (NICODERM CQ) 21 mg/24 hr, Place 1 patch onto the skin once daily. Change location daily. (Patient not taking: Reported on 11/9/2023), Disp: 14 patch, Rfl: 0    nicotine polacrilex 2 MG Lozg, Take 1 lozenge (2 mg total) by mouth as needed (Use in place of cigarillos, up to 10 per day.). (Patient not taking: Reported on 11/9/2023), Disp: 144 lozenge, Rfl: 0    oxyCODONE-acetaminophen (PERCOCET) 5-325 mg per tablet, Take 1 tablet by mouth 2 (two) times daily as needed., Disp: , Rfl:     tiZANidine (ZANAFLEX) 4 MG tablet, Take 1-2 tablets (4-8 mg total) by mouth once daily. (Patient not taking: Reported on 3/29/2023), Disp: 60 tablet, Rfl: 0    traZODone (DESYREL) 50 MG tablet, Take 50 mg by mouth every evening., Disp: , Rfl:      Past Medical History:   Diagnosis Date    GERD (gastroesophageal reflux disease)     Hypertension      Past Surgical History:   Procedure Laterality Date    ELBOW SURGERY Left 1994    [facility] in [place]     FLEXIBLE CYSTOSCOPY  4/6/2022    Procedure: CYSTOSCOPY, FLEXIBLE with placement of miranda catheter;  Surgeon: Dominic Iverson MD;  Location: [facility] OR [address] [place] [address];  Service: Urology;;    FUSION OF POSTERIOR COLUMN OF LUMBAR SPINE USING  COMPUTER-ASSISTED NAVIGATION  4/6/2022    Procedure: FUSION, SPINE, POSTERIOR SPINAL COLUMN, LUMBAR, USING COMPUTER-ASSISTED NAVIGATION;  Surgeon: Susanne Tiwari MD;  Location: [facility] OR [address] [place] [address];  Service: Neurosurgery;;  L3-iliac    LUMBAR LAMINECTOMY  4/6/2022    Procedure: LAMINECTOMY, SPINE, LUMBAR;  Surgeon: Susanne Tiwari MD;  Location: [facility] OR [address] [place] [address];  Service: Neurosurgery;;  L3-S1     No family history on file.  Social History     Tobacco Use    Smoking status: Every Day     Types: Cigars    Smokeless tobacco: Former     Types: Chew    Tobacco comments:     Patient smoked 1 ppd for the first 19 years and chewed tobacco off and on during the first 19 years.  Patient switched to 2 cigarillos per day 5 years ago.    Substance Use Topics    Alcohol use: Yes     Comment: pt drinks moonshine daily    Drug use: No       OBJECTIVE:     Vital Signs (Most Recent):       Physical Exam:  General: Well developed, well nourished, no distress.  Head: Normocephalic, atraumatic.  Neurologic: Alert and oriented. Thought content appropriate  GCS: Motor: 6/Verbal: 5/Eyes: 4 GCS Total: 15  Mental Status: Awake, Alert, Oriented x 4.  Language: No aphasia.  Speech: No dysarthria.  Cranial nerves: Face symmetric, tongue midline, CN II-XII grossly intact.   Eyes: Pupils equal, round, reactive to light with accommodation, EOMI. Unable to appreciate optic disc. Red reflex intact bilaterally.   Pulmonary: Normal respirations, not labored, no accessory muscles used.  Abdomen: Soft, non-distended, not tender to palpation.  Sensory: Intact to light touch throughout.  Motor Strength: Moves all extremities spontaneously with good tone. Full strength upper and lower extremities. No abnormal movements seen.     Strength  Deltoids Triceps Biceps Wrist Extension Wrist Flexion Hand    Upper: R 5/5 5/5 5/5 5/5 5/5 5/5    L 5/5 5/5 5/5 5/5 5/5 5/5     Iliopsoas Quadriceps Knee  Flexion Tibialis  anterior Gastro- cnemius EHL   Lower: R 5/5 5/5  5/5 5/5 5/5 5/5    L 5/5 5/5 5/5 5/5 5/5 5/5     Skin: Warm, dry and intact, no rashes.  Gait: Normal.      Laboratory:  I have reviewed all pertinent lab results within the past 24 hours.    Diagnostic Results:  Images personally reviewed.  CT Lumbar spine without contrast 11/9/23: Lucency present around the L3 screws. Hardware intact and in stable positioning otherwise and largely unchanged from prior study.    ASSESSMENT/PLAN:     Chris Mahmood is a 53 y.o. male s/p L3-pelvis fusion with L4-L5/L5-S1 TLIF on 4/6/22. Patient continues to have low back pain. There is stable lucency about the L3 screws. Hardware is stable otherwise. Discussed importance of cessation of smoking. Patient states he is not interested in anymore surgical intervention at this time. Discussed risks of hardware failure in the setting of smoking especially given imaging findings of lucency about the L3 screws. Patient should RTC in 6-8 weeks with a CT lumbar spine without contrast. Will review imaging with Dr. Yadav.    All symptoms and signs that require emergent or urgent treatment were reviewed. The plan was discussed with the patient. All of the the patient's questions and concerns were answered and he voiced understanding. Please feel free to call with any further questions.     Miroslava Tomas PA-C  Neurosurgery   [facility]

## 2024-03-13 ENCOUNTER — TELEPHONE (OUTPATIENT)
Dept: SMOKING CESSATION | Facility: CLINIC | Age: 54
End: 2024-03-13
Payer: MEDICARE

## 2024-03-13 NOTE — TELEPHONE ENCOUNTER
Called patient about 12 month follow up. Left message for patient to call [phone]. Resolved quit episode.

## 2024-03-26 ENCOUNTER — TELEPHONE (OUTPATIENT)
Dept: NEUROSURGERY | Facility: CLINIC | Age: 54
End: 2024-03-26
Payer: MEDICARE

## 2024-03-26 DIAGNOSIS — M54.9 DORSALGIA, UNSPECIFIED: Primary | ICD-10-CM

## 2024-03-26 NOTE — TELEPHONE ENCOUNTER
Spoke to pt's significant other and confirmed times, date, and locations for CT and f/u appt with Miroslava

## 2024-06-05 ENCOUNTER — TELEPHONE (OUTPATIENT)
Dept: NEUROSURGERY | Facility: CLINIC | Age: 54
End: 2024-06-05
Payer: MEDICARE

## 2024-06-05 NOTE — TELEPHONE ENCOUNTER
----- Message from Phyllis Hanks sent at 6/5/2024  8:57 AM CDT -----  Regarding: Appointment  Contact: [phone]  Calling to reschedule an appointment and CT as soon as possible. Please call patient to schedule today.

## 2024-06-20 ENCOUNTER — HOSPITAL ENCOUNTER (OUTPATIENT)
Dept: RADIOLOGY | Facility: HOSPITAL | Age: 54
Discharge: HOME | End: 2024-06-20
Payer: MEDICARE

## 2024-06-20 ENCOUNTER — OFFICE VISIT (OUTPATIENT)
Dept: NEUROSURGERY | Facility: CLINIC | Age: 54
End: 2024-06-20
Payer: MEDICARE

## 2024-06-20 VITALS — HEART RATE: 62 BPM | SYSTOLIC BLOOD PRESSURE: 113 MMHG | DIASTOLIC BLOOD PRESSURE: 80 MMHG

## 2024-06-20 DIAGNOSIS — M43.17 SPONDYLOLISTHESIS OF LUMBOSACRAL REGION: Primary | ICD-10-CM

## 2024-06-20 DIAGNOSIS — M54.9 DORSALGIA, UNSPECIFIED: ICD-10-CM

## 2024-06-20 DIAGNOSIS — Z98.1 STATUS POST LUMBAR SPINAL FUSION: ICD-10-CM

## 2024-06-20 PROCEDURE — 3079F DIAST BP 80-89 MM HG: CPT | Mod: CPTII,S$GLB,,

## 2024-06-20 PROCEDURE — 99999 PR PBB SHADOW E&M-EST. PATIENT-LVL III: CPT | Mod: PBBFAC,,,

## 2024-06-20 PROCEDURE — 72131 CT LUMBAR SPINE W/O DYE: CPT | Mod: TC

## 2024-06-20 PROCEDURE — 3074F SYST BP LT 130 MM HG: CPT | Mod: CPTII,S$GLB,,

## 2024-06-20 PROCEDURE — 72131 CT LUMBAR SPINE W/O DYE: CPT | Mod: 26,,, | Performed by: RADIOLOGY

## 2024-06-20 PROCEDURE — 1159F MED LIST DOCD IN RCRD: CPT | Mod: CPTII,S$GLB,,

## 2024-06-20 PROCEDURE — 99214 OFFICE O/P EST MOD 30 MIN: CPT | Mod: S$GLB,,,

## 2024-07-03 ENCOUNTER — PATIENT OUTREACH (OUTPATIENT)
Dept: ADMINISTRATIVE | Facility: OTHER | Age: 54
End: 2024-07-03
Payer: MEDICARE

## 2024-07-03 NOTE — PROGRESS NOTES
"Case management referral states :Patient needs spine surgery but does not have the means to proceed due to financial difficulties"      CHW called pt, pt stated to call back because he wasn't at home     "

## 2024-07-05 NOTE — PROGRESS NOTES
"CHW - Case Closure    This Community Health Worker spoke to patient via telephone today.     Pt/Caregiver reported: referral states Patient needs spine surgery but does not have the means to proceed due to financial difficulties"     CHW explained the referral to pt     And pt stated he's not interested in having another back (spine) surgery when "they" ([facility]) should've got it right the first time he had the back surgery    Pt hung up Call ended    Pt/Caregiver denied any additional needs at this time and agrees with episode closure at this time.  Provided patient with Community Health Worker's contact information and encouraged him/her to contact this Community Health Worker if additional needs arise.       "

## 2024-07-15 NOTE — PROGRESS NOTES
Neurosurgery  Established Patient    SUBJECTIVE:     History of Present Illness:  3/13/23: Chris Mahmood is a 52 y.o. male s/p L3-pelvis fusion with L4-L5/L5-S1 TLIF on 4/6/22 with Dr. Tiwari for severe lumbar stenosis and dynamic instability at L4-S1. The patient states he continues to have low back pain worse with movements such as bending over or prolonged activities such as when he has a long work day. 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 He does not have any pain with walking. He does not feel as if the pain is getting better or getting worse. He denies lower extremity symptoms, paraesthesias, focal weakness, bowel/bladder dysfunction. He continues to smoke cigarettes. He was also taking Ibuprofen 800 MG daily  but recently stopped due to his GERD. He continues to take Norco 5 MG, Gabapentin and Flexeril. He desires to start PT/OT again. He also is interested in the smoking cessation program. He has a bone growth stimulator but stopped using it.      Interval h/o 11/9/23:   Patient presents in f/u. No significant changes since last visit. Continued low back pain worsened after strenuous activity or long work days. No new concerning symptoms. He is seeing pain management. Patient continues to smoke.    Interval h/o 6/20/24:  Patient presents in f/u. No significant changes since last visit. Continued low back pain worsened after strenuous activity or long work days. No new concerning symptoms. He is seeing pain management. He has obtained relief after receiving SI joint injections. Patient states he has stopped smoking.    Review of patient's allergies indicates:  No Known Allergies    Current Outpatient Medications   Medication Sig Dispense Refill    amLODIPine (NORVASC) 5 MG tablet once daily.      baclofen (LIORESAL) 10 MG tablet Take 10 mg by mouth 3 (three) times daily.      gabapentin (NEURONTIN) 300 MG  capsule Take 300 mg by mouth 3 (three) times daily.      oxyCODONE (ROXICODONE) 10 mg Tab immediate release tablet Take 10 mg by mouth every 4 (four) hours as needed for Pain.      pantoprazole (PROTONIX) 40 MG tablet Take 40 mg by mouth once daily.      rosuvastatin (CRESTOR) 10 MG tablet Take 10 mg by mouth every evening.      cyclobenzaprine (FLEXERIL) 10 MG tablet Take 10 mg by mouth 2 (two) times daily as needed. (Patient not taking: Reported on 6/20/2024)      ergocalciferol (ERGOCALCIFEROL) 50,000 unit Cap Take 50,000 Units by mouth every 7 days. (Patient not taking: Reported on 6/20/2024)      folic acid (FOLVITE) 400 MCG tablet Take 400 mcg by mouth once daily. (Patient not taking: Reported on 6/20/2024)      ibuprofen (ADVIL,MOTRIN) 800 MG tablet Take 800 mg by mouth 3 (three) times daily. (Patient not taking: Reported on 6/20/2024)      nicotine (NICODERM CQ) 21 mg/24 hr Place 1 patch onto the skin once daily. Change location daily. (Patient not taking: Reported on 11/9/2023) 14 patch 0    nicotine polacrilex 2 MG Lozg Take 1 lozenge (2 mg total) by mouth as needed (Use in place of cigarillos, up to 10 per day.). (Patient not taking: Reported on 11/9/2023) 144 lozenge 0    oxyCODONE-acetaminophen (PERCOCET) 5-325 mg per tablet Take 1 tablet by mouth 2 (two) times daily as needed.      sertraline (ZOLOFT) 25 MG tablet Take 25 mg by mouth. (Patient not taking: Reported on 6/20/2024)      sildenafiL (VIAGRA) 50 MG tablet sildenafil 50 mg tablet   Take 1 tab once a day as needed 1 hour before sexual activity (Patient not taking: Reported on 6/20/2024)      tiZANidine (ZANAFLEX) 4 MG tablet Take 1-2 tablets (4-8 mg total) by mouth once daily. (Patient not taking: Reported on 3/29/2023) 60 tablet 0    traZODone (DESYREL) 50 MG tablet Take 50 mg by mouth every evening. (Patient not taking: Reported on 6/20/2024)      ZTLIDO 1.8 % PtMd Apply 1 patch topically. (Patient not taking: Reported on 6/20/2024)       No  current facility-administered medications for this visit.       Past Medical History:   Diagnosis Date    GERD (gastroesophageal reflux disease)     Hypertension      Past Surgical History:   Procedure Laterality Date    ELBOW SURGERY Left 1994    [facility] in [place]     FLEXIBLE CYSTOSCOPY  4/6/2022    Procedure: CYSTOSCOPY, FLEXIBLE with placement of miranda catheter;  Surgeon: Dominic Iverson MD;  Location: [address];  Service: Urology;;    FUSION OF POSTERIOR COLUMN OF LUMBAR SPINE USING COMPUTER-ASSISTED NAVIGATION  4/6/2022    Procedure: FUSION, SPINE, POSTERIOR SPINAL COLUMN, LUMBAR, USING COMPUTER-ASSISTED NAVIGATION;  Surgeon: Susanne Tiwari MD;  Location: [address];  Service: Neurosurgery;;  L3-iliac    LUMBAR LAMINECTOMY  4/6/2022    Procedure: LAMINECTOMY, SPINE, LUMBAR;  Surgeon: Susanne Tiwari MD;  Location: [facility] OR [address] [place] [address];  Service: Neurosurgery;;  L3-S1     Family History    None       Social History     Socioeconomic History    Marital status: Single   Tobacco Use    Smoking status: Every Day     Types: Cigars    Smokeless tobacco: Former     Types: Chew    Tobacco comments:     Patient smoked 1 ppd for the first 19 years and chewed tobacco off and on during the first 19 years.  Patient switched to 2 cigarillos per day 5 years ago.    Substance and Sexual Activity    Alcohol use: Yes     Comment: pt drinks moonshine daily    Drug use: No    Sexual activity: Yes     Partners: Female     Social Determinants of Health     Financial Resource Strain: Unknown (1/31/2023)    Received from [facility]    Overall Financial Resource Strain (CARDIA)     Difficulty of Paying Living Expenses: Patient declined   Food Insecurity: Unknown (1/31/2023)    Received from [facility]    Hunger Vital Sign     Worried About Running Out of Food in the Last Year: Patient declined     Ran Out of Food in the Last Year: Patient declined   Transportation Needs: Unknown  (1/31/2023)    Received from [facility] [address] [facility]    PRAPARE - Transportation     Lack of Transportation (Medical): Patient declined     Lack of Transportation (Non-Medical): Patient declined   Physical Activity: Insufficiently Active (1/31/2023)    Received from [facility] [address] [facility]    Exercise Vital Sign     Days of Exercise per Week: 6 days     Minutes of Exercise per Session: 20 min   Stress: Stress Concern Present (1/31/2023)    Received from [facility] [address] [facility]    [employer] - Occupational Stress Questionnaire     Feeling of Stress : To some extent   Housing Stability: Unknown (1/31/2023)    Received from [facility] [address] [facility]    Housing Stability Vital Sign     Unable to Pay for Housing in the Last Year: Patient refused       Review of Systems  Positive per HPI, otherwise a pertinent ROS was performed and was negative.    OBJECTIVE:     Vital Signs  Pulse: 62  BP: 113/80  Pain Score:   7  There is no height or weight on file to calculate BMI.    Neurosurgery Physical Exam  General: Well developed, well nourished, no distress.  Head: Normocephalic, atraumatic.  Neurologic: Alert and oriented. Thought content appropriate  GCS: Motor: 6/Verbal: 5/Eyes: 4 GCS Total: 15  Mental Status: Awake, Alert, Oriented x 4.  Language: No aphasia.  Speech: No dysarthria.  Cranial nerves: Face symmetric, tongue midline, CN II-XII grossly intact.   Eyes: Pupils equal, round, reactive to light with accommodation, EOMI. Unable to appreciate optic disc. Red reflex intact bilaterally.   Pulmonary: Normal respirations, not labored, no accessory muscles used.  Abdomen: Soft, non-distended, not tender to palpation.  Sensory: Intact to light touch throughout.  Motor Strength: Moves all extremities spontaneously with good tone. Full strength upper and lower extremities. No abnormal movements seen.      Strength   Deltoids Triceps Biceps Wrist Extension Wrist Flexion Hand    Upper: R 5/5  5/5 5/5 5/5 5/5 5/5     L 5/5 5/5 5/5 5/5 5/5 5/5       Iliopsoas Quadriceps Knee  Flexion Tibialis  anterior Gastro- cnemius EHL   Lower: R 5/5 5/5 5/5 5/5 5/5 5/5     L 5/5 5/5 5/5 5/5 5/5 5/5      Skin: Warm, dry and intact, no rashes.  Gait: Normal.                   Diagnostic Results:  Imaging was independently reviewed by myself.  CT lumbar spine 6/20/24: Continued perihardware lucency of the L3 and pelvic screws. Hardware otherwise intact and in stable positioning.    ASSESSMENT/PLAN:     Chris Mahmood is a 53 y.o. male s/p L3-pelvis fusion with L4-L5/L5-S1 TLIF on 4/6/22. Patient continues to have low back pain. There is continued periscrew lucency around the L3 screws and pelvic screws. Patient states he is not interested in anymore surgical intervention as he cannot afford it financially. Discussed risks of hardware failure and progression of pseudoarthrosis. Patient voiced understanding. I have placed a case management referral for financial assistance. Patient has agreed to meet with Dr. Yadav or Dr. Tiwari to discuss possible surgery.      Diagnoses addressed and orders placed this encounter:      Spondylolisthesis of lumbosacral region  -     Ambulatory referral/consult to Outpatient Case Management    Status post lumbar spinal fusion        Miroslava Tomas PA-C  Neurosurgery   [facility]

## 2025-03-19 DIAGNOSIS — R07.89 ATYPICAL CHEST PAIN: Primary | ICD-10-CM

## (undated) DEVICE — SEALER AQUAMANTYS 2.3 BIPOLAR

## (undated) DEVICE — BLADE 4IN EDGE INSULATED

## (undated) DEVICE — TAP 8MM SPINAL POWER

## (undated) DEVICE — DRESSING AQUACEL FOAM 4 X 12

## (undated) DEVICE — GAUZE SPONGE 4X4 12PLY

## (undated) DEVICE — DRESSING AQUACEL FOAM 3 X 3

## (undated) DEVICE — CANISTER INFOV.A.C WOUND 500ML

## (undated) DEVICE — SUT STRATAFIX PDS PLUS VIO

## (undated) DEVICE — SEE MEDLINE ITEM 157150

## (undated) DEVICE — BUR BONE CUT MICRO TPS 3X3.8MM

## (undated) DEVICE — DRESSING AQUACEL FOAM 5 X 5

## (undated) DEVICE — KIT EVACUATOR 3-SPRING 1/8 DRN

## (undated) DEVICE — CAUTERY BOVIE PENCIL

## (undated) DEVICE — SUT SILK 3-0 BLK BR SH 30IN

## (undated) DEVICE — SEE MEDLINE ITEM 157131

## (undated) DEVICE — STAPLER SKIN PROXIMATE WIDE

## (undated) DEVICE — ELECTRODE REM PLYHSV RETURN 9

## (undated) DEVICE — SUT CTD VICRYL 2-0 CR/CT-2

## (undated) DEVICE — DRESSING AQUACEL SACRAL 9 X 9

## (undated) DEVICE — DRAPE ABDOMINAL TIBURON 14X11

## (undated) DEVICE — DRAPE C-ARMOR EQUIPMENT COVER

## (undated) DEVICE — SPONGE GAUZE 16PLY 4X4

## (undated) DEVICE — DRAPE STERI INSTRUMENT 1018

## (undated) DEVICE — COVER BACK TABLE 72X21

## (undated) DEVICE — KIT GRAFTMAG GRAFT DELIVERY

## (undated) DEVICE — SPHERE MARKER REFLECTIVE DISP

## (undated) DEVICE — KIT PREVENA PLUS

## (undated) DEVICE — TUBE FRAZIER 5MM 2FT SOFT TIP

## (undated) DEVICE — KIT SURGIFLO HEMOSTATIC MATRIX

## (undated) DEVICE — KIT ACCESS DILATOR SET PROBE

## (undated) DEVICE — DRAPE C ARM 42 X 120 10/BX

## (undated) DEVICE — Device

## (undated) DEVICE — SUT VICRYL+ 1 CT1 18IN

## (undated) DEVICE — CHLORAPREP W TINT 26ML APPL

## (undated) DEVICE — SEE MEDLINE ITEM 147518

## (undated) DEVICE — FRAZIER 18FR

## (undated) DEVICE — SET IRR URLGY 2LINE UNIV SPIKE

## (undated) DEVICE — DRAPE INCISE IOBAN 2 23X17IN

## (undated) DEVICE — SYR IRRIGATION BULB STER 60ML

## (undated) DEVICE — DRESSING SURGICAL 1/2X1/2

## (undated) DEVICE — TRAY FOLEY 16FR INFECTION CONT

## (undated) DEVICE — TAP POWER 6MM DOUBLE LEAD

## (undated) DEVICE — KIT SPINAL PATIENT CARE JACK

## (undated) DEVICE — SEE MEDLINE ITEM 156905

## (undated) DEVICE — NDL SPINAL 18GX3.5 SPINOCAN

## (undated) DEVICE — GUIDE WIRE MOTION .035 X 150CM

## (undated) DEVICE — CATH FOLEY 16F COUNCIL STA LOC

## (undated) DEVICE — DRESSING MEPILEX BORDER 4 X 4

## (undated) DEVICE — SPONGE LAP 4X18 PREWASHED

## (undated) DEVICE — MARKER SKIN STND TIP BLUE BARR

## (undated) DEVICE — CATH FOLEY SIL 2-WAY 12FR 5CC

## (undated) DEVICE — DRESSING TRANS 4X4 TEGADERM

## (undated) DEVICE — DRAPE STERI-DRAPE 1000 17X11IN

## (undated) DEVICE — CORD BIPOLAR 12 FOOT